# Patient Record
Sex: MALE | Race: WHITE | NOT HISPANIC OR LATINO | Employment: OTHER | URBAN - METROPOLITAN AREA
[De-identification: names, ages, dates, MRNs, and addresses within clinical notes are randomized per-mention and may not be internally consistent; named-entity substitution may affect disease eponyms.]

---

## 2017-01-12 ENCOUNTER — INITIAL CONSULT (OUTPATIENT)
Dept: SURGERY | Facility: CLINIC | Age: 72
End: 2017-01-12
Payer: MEDICARE

## 2017-01-12 VITALS
RESPIRATION RATE: 18 BRPM | WEIGHT: 250 LBS | DIASTOLIC BLOOD PRESSURE: 80 MMHG | BODY MASS INDEX: 33.13 KG/M2 | HEART RATE: 66 BPM | HEIGHT: 73 IN | OXYGEN SATURATION: 97 % | SYSTOLIC BLOOD PRESSURE: 122 MMHG

## 2017-01-12 DIAGNOSIS — K31.84 GASTROPARESIS: ICD-10-CM

## 2017-01-12 DIAGNOSIS — Z01.818 PREOP TESTING: ICD-10-CM

## 2017-01-12 DIAGNOSIS — R10.13 EPIGASTRIC ABDOMINAL PAIN: Primary | ICD-10-CM

## 2017-01-12 PROCEDURE — 99204 OFFICE O/P NEW MOD 45 MIN: CPT | Mod: S$GLB,,, | Performed by: PHYSICIAN ASSISTANT

## 2017-01-12 RX ORDER — AMLODIPINE AND BENAZEPRIL HYDROCHLORIDE 5; 10 MG/1; MG/1
1 CAPSULE ORAL DAILY
COMMUNITY
End: 2017-01-19 | Stop reason: SDUPTHER

## 2017-01-12 NOTE — MR AVS SNAPSHOT
[facility]  [address]  [address] [address]  Phone: [phone]  Fax: [phone]                  Nimesh Saini   2017 1:15 PM   Initial consult    Description:  Male : 1945   Provider:  Deepika Ramírez PA-C   Department:  [facility]           Reason for Visit     Other           Diagnoses this Visit        Comments    Epigastric abdominal pain    -  Primary     Gastroparesis         Preop testing                To Do List           Goals (5 Years of Data)     None      [facility]     [facility] Care [facility] -  Assistance  Registered nurses in the [facility] Call Center provide clinical advisement, health education, appointment booking, and other advisory services.  Call for this free service at [phone].             Medications           Message regarding Medications     Verify the changes and/or additions to your medication regime listed below are the same as discussed with your clinician today.  If any of these changes or additions are incorrect, please notify your healthcare provider.        STOP taking these medications     KLOR-CON M10 10 mEq tablet Take 2 tablets (20 mEq total) by mouth once daily.    amoxicillin-clavulanate 875-125mg (AUGMENTIN) 875-125 mg per tablet            Verify that the below list of medications is an accurate representation of the medications you are currently taking.  If none reported, the list may be blank. If incorrect, please contact your healthcare provider. Carry this list with you in case of emergency.           Current Medications     amlodipine-benazepril 5-10 mg (LOTREL) 5-10 mg per capsule Take 1 capsule by mouth once daily.    lisinopril (PRINIVIL,ZESTRIL) 20 MG tablet Take 2 tablets (40 mg total) by mouth once daily.    omeprazole (PRILOSEC) 20 MG capsule Take 1 capsule (20 mg total) by mouth once daily.    PROAIR HFA 90 mcg/actuation inhaler     tadalafil  "(CIALIS) 5 MG tablet Take 1 tablet (5 mg total) by mouth daily as needed for Erectile Dysfunction.           Clinical Reference Information           Vital Signs - Last Recorded  Most recent update: 1/12/2017  1:13 PM by Maia Yanes MA    BP Pulse Resp Ht Wt SpO2    122/80 (BP Location: Right arm, Patient Position: Sitting, BP Method: Manual) 66 18 6' 1" (1.854 m) 113.4 kg (250 lb) 97%    BMI                32.98 kg/m2          Blood Pressure          Most Recent Value    BP  122/80      Allergies as of 1/12/2017     No Known Allergies      Immunizations Administered on Date of Encounter - 1/12/2017     None      Orders Placed During Today's Visit      Normal Orders This Visit    Case request GI: ESOPHAGOGASTRODUODENOSCOPY (EGD)     Future Labs/Procedures Expected by Expires    Basic metabolic panel  1/12/2017 3/13/2018    CBC auto differential  1/12/2017 3/13/2018    EKG 12-lead  As directed 1/12/2018      [facility] Sign-Up     Activating your [facility] account is as easy as 1-2-3!     1) Visit [email or web address], select Sign Up Now, enter this activation code and your date of birth, then select Next.  [email or web address]  Expires: 2/26/2017  1:35 PM      2) Create a username and password to use when you visit [facility] in the future and select a security question in case you lose your password and select Next.    3) Enter your e-mail address and click Sign Up!    Additional Information  If you have questions, please e-mail [email or web address] or call [phone] to talk to our [facility] staff. Remember, [facility] is NOT to be used for urgent needs. For medical emergencies, dial 911.         Instructions    1.  Nothing to eat or drink after midnight Monday 1/16/2017  2.  Report to the Surgery Department @ Tuesday 1/17/2017  3.  You may take your blood pressure medication with a sip of water prior to reporting to surgery on Tuesday 1/17/2017.  4.  Call our office with any questions/concerns       "

## 2017-01-12 NOTE — PATIENT INSTRUCTIONS
1.  Nothing to eat or drink after midnight Monday 1/16/2017  2.  Report to the Surgery Department @ Tuesday 1/17/2017  3.  You may take your blood pressure medication with a sip of water prior to reporting to surgery on Tuesday 1/17/2017.  4.  Call our office with any questions/concerns

## 2017-01-12 NOTE — LETTER
January 12, 2017      Stephane Fuentes Jr., MD  [address]  [facility] [address]           [facility]  [address]  [address]  [facility] [address]  Phone: [phone]  Fax: [phone]          Patient: Nimesh Saini   MR Number: [id number]   YOB: 1945   Date of Visit: 1/12/2017       Dear Dr. Stephane Fuentes Jr.:    Thank you for referring Mr. Nimesh Saini to me for evaluation. Attached you will find relevant portions of my assessment and plan of care.    Impression:  Patient evaluated for epigastric abdominal pain with sensation of food not wanting to pass through the stomach.  Patient advises it is hard for him to belch.  Patient admits to nausea without emesis.    Plan:  EGD on 1/17/2017 with preoperative testing to include:  CBC, BMP & EKG.    If you have questions, please do not hesitate to call me. I look forward to following Mr. Nimesh Saini along with you.    Thank you for the opportunity to assist in the care of your patient.    Sincerely,      Deepika Ramírez PA-C    Enclosure  CC:  No Recipients    If you would like to receive this communication electronically, please contact [email or web address] or [phone] to request more information on [employer] access.    For providers and/or their staff who would like to refer a patient to [facility], please contact us through our one-stop-shop provider referral line, [facility], at [phone].    If you feel you have received this communication in error or would no longer like to receive these types of communications, please e-mail [email or web address]

## 2017-01-12 NOTE — PROGRESS NOTES
"History & Physical    SUBJECTIVE:     History of Present Illness:  Patient is a 71 y.o. male presents with complaints of his food "getting stuck" in the epigastric region of the abdomen.  Patient advises this problem has been occurring for quite some time.  Patient advises he does not go to the doctor right away.  Patient thinks this problem has been occurring since late October 2016 or early November 2016.   Patient admits to nausea, but denies vomiting, diarrhea or constipation.  Patient also advised it is hard for him to "belch" the food down.  I have reviewed patient's medical issues, past surgeries, social history, medications and allergies with him.    Chief Complaint   Patient presents with    Other     Food won't go down       Review of patient's allergies indicates:  No Known Allergies    Current Outpatient Prescriptions   Medication Sig Dispense Refill    amlodipine-benazepril 5-10 mg (LOTREL) 5-10 mg per capsule Take 1 capsule by mouth once daily.      lisinopril (PRINIVIL,ZESTRIL) 20 MG tablet Take 2 tablets (40 mg total) by mouth once daily. 90 tablet 3    omeprazole (PRILOSEC) 20 MG capsule Take 1 capsule (20 mg total) by mouth once daily. 30 capsule 0    PROAIR HFA 90 mcg/actuation inhaler       tadalafil (CIALIS) 5 MG tablet Take 1 tablet (5 mg total) by mouth daily as needed for Erectile Dysfunction. 30 tablet 11     No current facility-administered medications for this visit.        Past Medical History   Diagnosis Date    Diverticulitis large intestine 08/2014    Hypertension      Past Surgical History   Procedure Laterality Date    Umbilical hernia repair       Family History   Problem Relation Age of Onset    Diabetes Mother     Hypertension Father     Stroke Father      Social History   Substance Use Topics    Smoking status: Never Smoker    Smokeless tobacco: None    Alcohol use No        Review of Systems:  Review of Systems   Constitutional: Negative for activity change, " "appetite change, chills, diaphoresis, fatigue, fever and unexpected weight change.   HENT: Negative for congestion, postnasal drip, rhinorrhea, sinus pressure, sneezing and sore throat.    Respiratory: Negative for cough, choking, shortness of breath, wheezing and stridor.    Cardiovascular: Negative for chest pain and palpitations.   Gastrointestinal: Positive for abdominal distention, abdominal pain (epigastric) and nausea. Negative for constipation, diarrhea and vomiting.   Musculoskeletal: Negative for arthralgias, back pain, myalgias, neck pain and neck stiffness.   Skin: Negative for color change, pallor, rash and wound.   Neurological: Negative for dizziness, syncope, weakness, light-headedness and headaches.   Psychiatric/Behavioral: Negative for agitation, confusion and hallucinations. The patient is not nervous/anxious and is not hyperactive.        OBJECTIVE:     Vital Signs (Most Recent)  Pulse: 66 (01/12/17 1311)  Resp: 18 (01/12/17 1311)  BP: 122/80 (01/12/17 1311)  SpO2: 97 % (01/12/17 1311)  6' 1" (1.854 m)  113.4 kg (250 lb)     Physical Exam:  Physical Exam   Constitutional: He is oriented to person, place, and time. He appears well-developed and well-nourished. No distress.   HENT:   Head: Normocephalic and atraumatic.   Right Ear: External ear normal.   Left Ear: External ear normal.   Nose: Nose normal.   Eyes: Conjunctivae and EOM are normal. Pupils are equal, round, and reactive to light. No scleral icterus.   Neck: Normal range of motion. Neck supple. No tracheal deviation present.   Cardiovascular: Normal rate, regular rhythm and normal heart sounds.  Exam reveals no gallop and no friction rub.    No murmur heard.  Pulmonary/Chest: Effort normal and breath sounds normal. No stridor. No respiratory distress. He has no wheezes. He has no rales.   Abdominal: Soft. Bowel sounds are normal. He exhibits distension (slight). He exhibits no mass. There is tenderness (epigastric). There is no " guarding.   Musculoskeletal: Normal range of motion. He exhibits no edema or deformity.   Neurological: He is alert and oriented to person, place, and time. Coordination normal.   Skin: Skin is warm and dry. No rash noted. He is not diaphoretic. No erythema. No pallor.   Psychiatric: He has a normal mood and affect. His behavior is normal. Judgment and thought content normal.   Nursing note and vitals reviewed.      Laboratory  No recent CBC, BMP    Diagnostic Results:  No recent EKG    ASSESSMENT/PLAN:     1.  Epigastric Abdominal Pain  2.  Possible Gastroparesis  3.  HTN  4.  H/o Diverticulitis      PLAN:Plan     1.  EGD on Tuesday 1/17/2017  2.  Preoperative testing to include: CBC, BMP & EKG  3.  Dr Ramirez advised of above.

## 2017-01-17 PROBLEM — K21.9 HIATAL HERNIA WITH GERD: Status: ACTIVE | Noted: 2017-01-17

## 2017-01-17 PROBLEM — K44.9 HIATAL HERNIA WITH GERD: Status: ACTIVE | Noted: 2017-01-17

## 2017-01-18 NOTE — TELEPHONE ENCOUNTER
He should not be taking lisinopril and benazepril simultaneously. Please contact the patient to confirm refill request.

## 2017-01-19 ENCOUNTER — TELEPHONE (OUTPATIENT)
Dept: FAMILY MEDICINE | Facility: CLINIC | Age: 72
End: 2017-01-19

## 2017-01-19 RX ORDER — AMLODIPINE AND BENAZEPRIL HYDROCHLORIDE 5; 10 MG/1; MG/1
CAPSULE ORAL
Qty: 90 CAPSULE | Refills: 0 | Status: SHIPPED | OUTPATIENT
Start: 2017-01-19 | End: 2017-01-20 | Stop reason: SDUPTHER

## 2017-01-19 RX ORDER — LISINOPRIL 20 MG/1
TABLET ORAL
Qty: 90 TABLET | Refills: 0 | Status: SHIPPED | OUTPATIENT
Start: 2017-01-19 | End: 2017-01-19 | Stop reason: SDUPTHER

## 2017-01-19 RX ORDER — OMEPRAZOLE 20 MG/1
CAPSULE, DELAYED RELEASE ORAL
Qty: 90 CAPSULE | Refills: 3 | Status: SHIPPED | OUTPATIENT
Start: 2017-01-19 | End: 2017-04-03

## 2017-01-19 RX ORDER — LISINOPRIL 20 MG/1
40 TABLET ORAL DAILY
Qty: 90 TABLET | Refills: 3 | Status: SHIPPED | OUTPATIENT
Start: 2017-01-19 | End: 2017-01-20 | Stop reason: SDUPTHER

## 2017-01-19 RX ORDER — AMLODIPINE AND BENAZEPRIL HYDROCHLORIDE 5; 10 MG/1; MG/1
1 CAPSULE ORAL DAILY
Qty: 90 CAPSULE | Refills: 3 | Status: SHIPPED | OUTPATIENT
Start: 2017-01-19 | End: 2017-01-23

## 2017-01-19 RX ORDER — OMEPRAZOLE 20 MG/1
CAPSULE, DELAYED RELEASE ORAL
Qty: 30 CAPSULE | Refills: 0 | Status: SHIPPED | OUTPATIENT
Start: 2017-01-19 | End: 2017-01-19 | Stop reason: SDUPTHER

## 2017-01-19 NOTE — TELEPHONE ENCOUNTER
Dr. Fuentes, patient states he has been taking both lisinopril and amlodipine-benazepril at the same time for many years, greater than 10 years; please advise

## 2017-01-20 ENCOUNTER — TELEPHONE (OUTPATIENT)
Dept: FAMILY MEDICINE | Facility: CLINIC | Age: 72
End: 2017-01-20

## 2017-01-20 RX ORDER — LISINOPRIL 20 MG/1
40 TABLET ORAL DAILY
Qty: 90 TABLET | Refills: 3 | Status: SHIPPED | OUTPATIENT
Start: 2017-01-20 | End: 2017-05-16 | Stop reason: DRUGHIGH

## 2017-01-20 RX ORDER — AMLODIPINE AND BENAZEPRIL HYDROCHLORIDE 5; 10 MG/1; MG/1
1 CAPSULE ORAL DAILY
Qty: 90 CAPSULE | Refills: 3 | Status: SHIPPED | OUTPATIENT
Start: 2017-01-20 | End: 2017-01-23

## 2017-01-20 RX ORDER — AMLODIPINE BESYLATE 10 MG/1
10 TABLET ORAL DAILY
Qty: 90 TABLET | Refills: 3 | Status: ON HOLD | OUTPATIENT
Start: 2017-01-20 | End: 2017-05-02 | Stop reason: HOSPADM

## 2017-01-20 NOTE — TELEPHONE ENCOUNTER
Patient calling regarding his medication prilosec 20mg Lisinopril 20mg, Amlodipine 5-10mg , informed patient medications were sent to [facility], patient verbalized understanding

## 2017-01-20 NOTE — TELEPHONE ENCOUNTER
----- Message from Staci Hampton sent at 1/20/2017 11:56 AM CST -----  Need rx clarification.please advise.

## 2017-01-23 ENCOUNTER — OFFICE VISIT (OUTPATIENT)
Dept: SURGERY | Facility: CLINIC | Age: 72
End: 2017-01-23
Payer: MEDICARE

## 2017-01-23 VITALS
HEIGHT: 73 IN | HEART RATE: 66 BPM | WEIGHT: 250 LBS | DIASTOLIC BLOOD PRESSURE: 78 MMHG | OXYGEN SATURATION: 96 % | SYSTOLIC BLOOD PRESSURE: 130 MMHG | BODY MASS INDEX: 33.13 KG/M2 | RESPIRATION RATE: 19 BRPM

## 2017-01-23 DIAGNOSIS — K44.9 HIATAL HERNIA WITH GERD: Primary | ICD-10-CM

## 2017-01-23 DIAGNOSIS — K21.9 HIATAL HERNIA WITH GERD: Primary | ICD-10-CM

## 2017-01-23 PROCEDURE — 99213 OFFICE O/P EST LOW 20 MIN: CPT | Mod: S$GLB,,, | Performed by: EMERGENCY MEDICINE

## 2017-01-23 NOTE — PROGRESS NOTES
History & Physical    SUBJECTIVE:     History of Present Illness:  Patient is a 71 y.o. male presents for evaluation of epigastric pain . He had an EGD that was relatively unremarkable. He had an UGI that re-demonstrated H. Hernia and GERD without other significant findings      Chief Complaint   Patient presents with    Follow-up     EGD And Upper GI       Review of patient's allergies indicates:  No Known Allergies    Current Outpatient Prescriptions   Medication Sig Dispense Refill    amlodipine (NORVASC) 10 MG tablet Take 1 tablet (10 mg total) by mouth once daily. 90 tablet 3    lisinopril (PRINIVIL,ZESTRIL) 20 MG tablet Take 2 tablets (40 mg total) by mouth once daily. 90 tablet 3    omeprazole (PRILOSEC) 20 MG capsule TAKE 1 CAPSULE(20 MG) BY MOUTH EVERY DAY 90 capsule 3    PROAIR HFA 90 mcg/actuation inhaler       tadalafil (CIALIS) 5 MG tablet Take 1 tablet (5 mg total) by mouth daily as needed for Erectile Dysfunction. 30 tablet 11     No current facility-administered medications for this visit.        Past Medical History   Diagnosis Date    Diverticulitis large intestine 08/2014    Hypertension      Past Surgical History   Procedure Laterality Date    Umbilical hernia repair       Family History   Problem Relation Age of Onset    Diabetes Mother     Hypertension Father     Stroke Father      Social History   Substance Use Topics    Smoking status: Never Smoker    Smokeless tobacco: None    Alcohol use No        Review of Systems:  Review of Systems   Constitutional: Negative for appetite change, chills, diaphoresis, fatigue, fever and unexpected weight change.   HENT: Negative for congestion, dental problem, ear pain, facial swelling, mouth sores, nosebleeds, rhinorrhea, sinus pressure, sore throat, trouble swallowing and voice change.    Eyes: Negative for photophobia, redness and visual disturbance.   Respiratory: Negative for cough, choking, chest tightness, shortness of breath and  "wheezing.    Cardiovascular: Negative for chest pain, palpitations and leg swelling.   Gastrointestinal: Negative for abdominal distention, abdominal pain, anal bleeding, blood in stool, constipation, diarrhea, nausea, rectal pain and vomiting.        Improved ; has tried without prilosec; mild epigastric pain after eating   Endocrine: Negative for cold intolerance, heat intolerance, polydipsia, polyphagia and polyuria.   Genitourinary: Negative for difficulty urinating, discharge, dysuria, flank pain, hematuria, scrotal swelling and testicular pain.   Musculoskeletal: Negative for back pain, gait problem, joint swelling, neck pain and neck stiffness.   Skin: Negative for rash and wound.   Allergic/Immunologic: Negative for environmental allergies, food allergies and immunocompromised state.   Neurological: Negative for dizziness, seizures, syncope, facial asymmetry, speech difficulty, weakness, light-headedness, numbness and headaches.   Hematological: Negative for adenopathy. Does not bruise/bleed easily.   Psychiatric/Behavioral: Negative for confusion. The patient is not nervous/anxious.        OBJECTIVE:     Vital Signs (Most Recent)  Pulse: 66 (01/23/17 1405)  Resp: 19 (01/23/17 1405)  BP: 130/78 (01/23/17 1405)  SpO2: 96 % (01/23/17 1405)  6' 1" (1.854 m)  113.4 kg (250 lb)     Physical Exam:  Physical Exam   Constitutional: He is oriented to person, place, and time. He appears well-developed and well-nourished. No distress.   HENT:   Head: Normocephalic and atraumatic.   Right Ear: External ear normal.   Left Ear: External ear normal.   Nose: Nose normal.   Mouth/Throat: Oropharynx is clear and moist.   Eyes: Conjunctivae and EOM are normal. Pupils are equal, round, and reactive to light. No scleral icterus.   Neck: No JVD present. No tracheal deviation present. No thyromegaly present.   Cardiovascular: Normal rate, regular rhythm, normal heart sounds and intact distal pulses.  Exam reveals no gallop and no " friction rub.    No murmur heard.  Pulmonary/Chest: Effort normal and breath sounds normal. No stridor. No respiratory distress. He has no wheezes. He has no rales. He exhibits no tenderness.   Abdominal: Soft. Bowel sounds are normal. He exhibits no distension and no mass. There is no tenderness. There is no rebound and no guarding. No hernia.   Genitourinary: Rectum normal, prostate normal and penis normal.   Musculoskeletal: He exhibits no edema or tenderness.   Lymphadenopathy:     He has no cervical adenopathy.   Neurological: He is alert and oriented to person, place, and time. He has normal reflexes. He displays normal reflexes.   Skin: Skin is warm and dry. No rash noted. He is not diaphoretic. No erythema. No pallor.   Psychiatric: He has a normal mood and affect.       Diagnostic Results:  Reviewed his UGI with SBFT with radiologist and then with the patient    ASSESSMENT/PLAN:     Hiatal Hernia with mild reflux    PLAN:Plan     \Discussed use of mallox or mylanta 30 min after eating and prior to bedtime  He will call if no relief

## 2017-03-27 ENCOUNTER — OFFICE VISIT (OUTPATIENT)
Dept: FAMILY MEDICINE | Facility: CLINIC | Age: 72
End: 2017-03-27
Payer: MEDICARE

## 2017-03-27 VITALS
SYSTOLIC BLOOD PRESSURE: 110 MMHG | WEIGHT: 237.44 LBS | OXYGEN SATURATION: 96 % | BODY MASS INDEX: 31.47 KG/M2 | HEART RATE: 76 BPM | HEIGHT: 73 IN | DIASTOLIC BLOOD PRESSURE: 88 MMHG

## 2017-03-27 DIAGNOSIS — K44.9 HIATAL HERNIA WITH GERD: Primary | ICD-10-CM

## 2017-03-27 DIAGNOSIS — R06.09 EXERTIONAL DYSPNEA: ICD-10-CM

## 2017-03-27 DIAGNOSIS — K21.9 HIATAL HERNIA WITH GERD: Primary | ICD-10-CM

## 2017-03-27 PROCEDURE — 99214 OFFICE O/P EST MOD 30 MIN: CPT | Mod: S$PBB,,,

## 2017-03-27 PROCEDURE — 99213 OFFICE O/P EST LOW 20 MIN: CPT | Mod: PBBFAC,PO

## 2017-03-27 PROCEDURE — 99999 PR PBB SHADOW E&M-EST. PATIENT-LVL III: CPT | Mod: PBBFAC,,,

## 2017-03-27 NOTE — PROGRESS NOTES
Subjective:       Patient ID: Nimesh Saini is a 71 y.o. male.    Chief Complaint: Shortness of Breath    HPI The patient presents with complaints of shortness of breath associated with difficulty swallowing. He gets fullness and bloating in the epigastric region. He was evaluated by Dr. Ramirez and diagnosed with a Hiatal hernia and prescribed omeprazole. This is not helping and he is losing weight and is afraid to eat. His symptoms are resolved by having a BM. He is unable to belch. He has lost 25 lbs.     Review of Systems   Constitutional: Positive for appetite change. Negative for activity change, fatigue and unexpected weight change.   HENT: Positive for trouble swallowing.    Respiratory: Positive for shortness of breath and wheezing. Negative for cough and choking.         With exertion    Cardiovascular: Negative for chest pain.   Gastrointestinal: Positive for abdominal distention, abdominal pain and constipation. Negative for diarrhea, nausea and vomiting.   Neurological: Positive for light-headedness.   Psychiatric/Behavioral: Negative.    All other systems reviewed and are negative.      Objective:      Vitals:    03/27/17 0826   BP: 110/88   Pulse: 76     Physical Exam   Constitutional: He is oriented to person, place, and time. He appears well-developed and well-nourished. He is cooperative. No distress.   HENT:   Head: Normocephalic and atraumatic.   Right Ear: Hearing, tympanic membrane, external ear and ear canal normal.   Left Ear: Hearing, external ear and ear canal normal.   Nose: Nose normal.   Mouth/Throat: Oropharynx is clear and moist.   Eyes: Conjunctivae are normal. Pupils are equal, round, and reactive to light.   Neck: Normal range of motion. Neck supple. No thyromegaly present.   Cardiovascular: Normal rate, regular rhythm, normal heart sounds and intact distal pulses.    Pulmonary/Chest: Effort normal and breath sounds normal. No respiratory distress.   Musculoskeletal: Normal range  of motion. He exhibits no edema or tenderness.   Lymphadenopathy:     He has no cervical adenopathy.   Neurological: He is alert and oriented to person, place, and time. He has normal strength. Coordination and gait normal.   Skin: Skin is warm, dry and intact. No cyanosis. Nails show no clubbing.   Psychiatric: He has a normal mood and affect. His speech is normal and behavior is normal. Judgment and thought content normal. Cognition and memory are normal.   Vitals reviewed.      Assessment:       1. Hiatal hernia with GERD    2. Exertional dyspnea        Plan:       Hiatal hernia with GERD  -     Ambulatory referral to Gastroenterology  -     H. PYLORI ANTIBODY, IGG; Future; Expected date: 3/27/17  -     H. pylori antigen, stool; Future; Expected date: 3/27/17  -     CT Abdomen Pelvis  Without Contrast; Future; Expected date: 3/27/17    Exertional dyspnea  -     Ambulatory referral to Cardiology  -     X-Ray Chest PA And Lateral; Future; Expected date: 3/27/17      Return if symptoms worsen or fail to improve.

## 2017-03-27 NOTE — MR AVS SNAPSHOT
[facility]  [address] [place] [address]  Phone: [phone]  Fax: [phone]                  Nimesh Saini   3/27/2017 8:20 AM   Office Visit    Description:  Male : 1945   Provider:  Stephane Fuentes Jr., MD   Department:  [facility]           Reason for Visit     Shortness of Breath           Diagnoses this Visit        Comments    Hiatal hernia with GERD    -  Primary     Exertional dyspnea                To Do List           Goals (5 Years of Data)     None      Follow-Up and Disposition     Return if symptoms worsen or fail to improve.    Follow-up and Disposition History      [facility]     [facility] Care Line -  Assistance  Registered nurses in the [facility] Center provide clinical advisement, health education, appointment booking, and other advisory services.  Call for this free service at [phone].             Medications           Message regarding Medications     Verify the changes and/or additions to your medication regime listed below are the same as discussed with your clinician today.  If any of these changes or additions are incorrect, please notify your healthcare provider.             Verify that the below list of medications is an accurate representation of the medications you are currently taking.  If none reported, the list may be blank. If incorrect, please contact your healthcare provider. Carry this list with you in case of emergency.           Current Medications     amlodipine (NORVASC) 10 MG tablet Take 1 tablet (10 mg total) by mouth once daily.    lisinopril (PRINIVIL,ZESTRIL) 20 MG tablet Take 2 tablets (40 mg total) by mouth once daily.    omeprazole (PRILOSEC) 20 MG capsule TAKE 1 CAPSULE(20 MG) BY MOUTH EVERY DAY    PROAIR HFA 90 mcg/actuation inhaler     tadalafil (CIALIS) 5 MG tablet Take 1 tablet (5 mg total) by mouth daily as needed for Erectile Dysfunction.           Clinical  "Reference Information           Your Vitals Were     BP Pulse Height Weight SpO2 BMI    110/88 (BP Location: Right arm, Patient Position: Sitting, BP Method: Manual) 76 6' 1" (1.854 m) 107.7 kg (237 lb 7 oz) 96% 31.33 kg/m2      Blood Pressure          Most Recent Value    BP  110/88      Allergies as of 3/27/2017     No Known Allergies      Immunizations Administered on Date of Encounter - 3/27/2017     None      Orders Placed During Today's Visit      Normal Orders This Visit    Ambulatory referral to Cardiology     Ambulatory referral to Gastroenterology     Future Labs/Procedures Expected by Expires    CT Abdomen Pelvis  Without Contrast  3/27/2017 3/27/2018    H. PYLORI ANTIBODY, IGG  3/27/2017 5/26/2018    H. pylori antigen, stool  3/27/2017 3/27/2018    X-Ray Chest PA And Lateral  3/27/2017 3/27/2018      [facility] Sign-Up     Activating your [facility] account is as easy as 1-2-3!     1) Visit [email or web address], select Sign Up Now, enter this activation code and your date of birth, then select Next.  [email or web address]  Expires: 5/11/2017  8:58 AM      2) Create a username and password to use when you visit [facility] in the future and select a security question in case you lose your password and select Next.    3) Enter your e-mail address and click Sign Up!    Additional Information  If you have questions, please e-mail [email or web address] or call [phone] to talk to our [facility] staff. Remember, [facility] is NOT to be used for urgent needs. For medical emergencies, dial 911.         Language Assistance Services     ATTENTION: Language assistance services are available, free of charge. Please call [phone].      ATENCIÓN: Si habla español, tiene a jefferson disposición servicios gratuitos de asistencia lingüística. Llame al [phone].     CHÚ Ý: N?u b?n nói Ti?ng Vi?t, có các d?ch v? h? tr? ngôn ng? mi?n phí dành cho b?n. G?i s? [phone].         [facility] complies with applicable " Federal civil rights laws and does not discriminate on the basis of race, color, national origin, age, disability, or sex.

## 2017-03-30 ENCOUNTER — TELEPHONE (OUTPATIENT)
Dept: FAMILY MEDICINE | Facility: CLINIC | Age: 72
End: 2017-03-30

## 2017-03-30 NOTE — TELEPHONE ENCOUNTER
His CT showed fluid in the space between his lungs and ribs. This should be further evaluated by Dr. Avendano. His CT showed thickening in the rectal area. This should be further evaluated by GI.

## 2017-04-03 ENCOUNTER — INITIAL CONSULT (OUTPATIENT)
Dept: GASTROENTEROLOGY | Facility: CLINIC | Age: 72
End: 2017-04-03
Payer: MEDICARE

## 2017-04-03 ENCOUNTER — TELEPHONE (OUTPATIENT)
Dept: FAMILY MEDICINE | Facility: CLINIC | Age: 72
End: 2017-04-03

## 2017-04-03 VITALS
WEIGHT: 233 LBS | BODY MASS INDEX: 31.56 KG/M2 | DIASTOLIC BLOOD PRESSURE: 93 MMHG | HEIGHT: 72 IN | SYSTOLIC BLOOD PRESSURE: 140 MMHG | HEART RATE: 77 BPM

## 2017-04-03 DIAGNOSIS — K21.9 GASTROESOPHAGEAL REFLUX DISEASE, ESOPHAGITIS PRESENCE NOT SPECIFIED: ICD-10-CM

## 2017-04-03 DIAGNOSIS — R63.4 WEIGHT LOSS: ICD-10-CM

## 2017-04-03 DIAGNOSIS — R93.89 ABNORMAL FINDING ON IMAGING: Primary | ICD-10-CM

## 2017-04-03 PROCEDURE — 99999 PR PBB SHADOW E&M-EST. PATIENT-LVL II: CPT | Mod: PBBFAC,,, | Performed by: INTERNAL MEDICINE

## 2017-04-03 PROCEDURE — 99212 OFFICE O/P EST SF 10 MIN: CPT | Mod: PBBFAC,PN | Performed by: INTERNAL MEDICINE

## 2017-04-03 PROCEDURE — 99204 OFFICE O/P NEW MOD 45 MIN: CPT | Mod: S$PBB,,, | Performed by: INTERNAL MEDICINE

## 2017-04-03 RX ORDER — PANTOPRAZOLE SODIUM 40 MG/1
40 TABLET, DELAYED RELEASE ORAL DAILY
Qty: 30 TABLET | Refills: 11 | Status: SHIPPED | OUTPATIENT
Start: 2017-04-03 | End: 2018-03-07 | Stop reason: SDUPTHER

## 2017-04-03 NOTE — LETTER
April 3, 2017      Stephane Fuentes Jr., MD  [address] [place] [address]           [facility]  [address]  [address]  [facility] [address]  Phone: [phone]          Patient: Nimesh Saini   MR Number: [id number]   YOB: 1945   Date of Visit: 4/3/2017       Dear Dr. Stephane Fuentes Jr.:    Thank you for referring Nimesh Saini to me for evaluation. Attached you will find relevant portions of my assessment and plan of care.    If you have questions, please do not hesitate to call me. I look forward to following Nimesh Saini along with you.    Sincerely,    Hai Jay MD    Enclosure  CC:  No Recipients    If you would like to receive this communication electronically, please contact [email or web address] or [phone] to request more information on [employer] access.    For providers and/or their staff who would like to refer a patient to [facility], please contact us through our one-stop-shop provider referral line, [facility], at [phone].    If you feel you have received this communication in error or would no longer like to receive these types of communications, please e-mail [email or web address]

## 2017-04-03 NOTE — TELEPHONE ENCOUNTER
----- Message from Rosita Flowers sent at 4/3/2017  1:11 PM CDT -----  Contact: pt [phone]  TEST RESULTS:   Patient would like to get test results.  Name of test (lab, mammo, etc.): stool test   Date of test: last week

## 2017-04-03 NOTE — PROGRESS NOTES
Subjective:       Patient ID: Nimesh Saini is a 71 y.o. male.    Chief Complaint: Hiatal Hernia    This is a 71-year-old male who presents for evaluation of weight loss, dysphagia and reflux.  He has noted approximate 40 pound weight loss over the past 9 months.  The weight loss is unintentional has been associated with some GI symptoms.  In addition to dysphagia which occurs to both solids and liquids, he is also noted some significant fatigue, weakness, lethargy and malaise.  He notes increasing dyspnea on exertion, some orthopnea.  He does note some substernal burning sensation.  This has not responded to omeprazole.  An upper endoscopy noted reflux changes and a medium-sized hiatal hernia.  I reviewed the outpatient records also noting an unremarkable upper GI series.  Cardia megaly and bilateral pleural effusions were noted.  No cough or hemoptysis.  His history also of rectal surgery and reports colonoscopy about 2 years ago.  CT of the abdomen also notes some thickening in the rectum.  He tolerated anesthesia well for his upper endoscopy.    The following portions of the patient's history were reviewed and updated as appropriate: allergies, current medications, past family history, past medical history, past social history, past surgical history and problem list.    (Portions of this note were dictated using voice recognition software and may contain dictation related errors in spelling/grammar/syntax not found on text review)    HPI  Review of Systems   Constitutional: Positive for unexpected weight change. Negative for chills and fever.   HENT: Positive for trouble swallowing. Negative for postnasal drip.    Eyes: Negative for pain and visual disturbance.   Respiratory: Positive for shortness of breath and wheezing. Negative for cough and choking.    Cardiovascular: Negative for chest pain and leg swelling.   Gastrointestinal: Negative for abdominal distention, abdominal pain, anal bleeding, blood in  stool, constipation, diarrhea, nausea, rectal pain and vomiting.   Endocrine: Negative for cold intolerance and heat intolerance.   Genitourinary: Negative for difficulty urinating and hematuria.   Musculoskeletal: Positive for arthralgias. Negative for back pain.   Neurological: Negative for dizziness and numbness.   Hematological: Negative for adenopathy. Does not bruise/bleed easily.   Psychiatric/Behavioral: Negative for agitation and confusion.       Objective:      Physical Exam   Constitutional: He is oriented to person, place, and time. He appears well-developed and well-nourished. No distress.   HENT:   Head: Normocephalic.   Eyes: Conjunctivae are normal. No scleral icterus.   Neck: No tracheal deviation present. No thyromegaly present.   Cardiovascular: Normal rate, regular rhythm and normal heart sounds.  Exam reveals no gallop and no friction rub.    Pulmonary/Chest: Effort normal. He has wheezes. He has no rales.   Decreased at bases el   Abdominal: Soft. Bowel sounds are normal. He exhibits no distension. There is no tenderness. There is no rebound and no guarding.   Musculoskeletal: Normal range of motion. He exhibits edema. He exhibits no tenderness.   Neurological: He is alert and oriented to person, place, and time.   Skin: He is not diaphoretic.   Psychiatric: He has a normal mood and affect. His behavior is normal.   Nursing note and vitals reviewed.      Labs; reviewed  Assessment:       1. Abnormal finding on imaging    2. Gastroesophageal reflux disease, esophagitis presence not specified    3. Weight loss        Plan:   1. Suspect he has CHF, has cardiology apt in two weeks  2. Will likely need endoscopic evaluation of CT pelvis findings  3. If dysphagia persists, will consider manometry  4. Trial of alternate PPI, has failed OTC meds, H2 blocker and omeprazole  5. RTC 6 weeks

## 2017-04-04 ENCOUNTER — TELEPHONE (OUTPATIENT)
Dept: GASTROENTEROLOGY | Facility: CLINIC | Age: 72
End: 2017-04-04

## 2017-04-04 ENCOUNTER — TELEPHONE (OUTPATIENT)
Dept: FAMILY MEDICINE | Facility: CLINIC | Age: 72
End: 2017-04-04

## 2017-04-04 NOTE — TELEPHONE ENCOUNTER
----- Message from Eric Morley sent at 4/3/2017  4:41 PM CDT -----  Contact: self/[phone]  He is returning the nurse's call.

## 2017-04-04 NOTE — TELEPHONE ENCOUNTER
I left a voice mail message telling the pt that the rx was sent to the [facility] pharmacy due to the need for prior auth. I also explained this to the pt at the time of his visit.

## 2017-04-04 NOTE — TELEPHONE ENCOUNTER
----- Message from Leena Rayna sent at 4/3/2017  3:44 PM CDT -----  Contact: self, [phone]  Patient states his pantoprazole prescription was supposed to be sent to [facility] pharmacy in [place]. Please advise.

## 2017-05-02 PROBLEM — I50.41 ACUTE COMBINED SYSTOLIC AND DIASTOLIC CHF, NYHA CLASS 3: Status: ACTIVE | Noted: 2017-05-02

## 2017-05-02 PROBLEM — R06.02 SHORTNESS OF BREATH: Status: ACTIVE | Noted: 2017-05-02

## 2017-05-02 PROBLEM — I25.5 ISCHEMIC CARDIOMYOPATHY: Status: ACTIVE | Noted: 2017-05-02

## 2017-05-02 PROBLEM — I42.9 CARDIOMYOPATHY: Status: ACTIVE | Noted: 2017-05-02

## 2017-05-02 PROBLEM — I25.10 CORONARY ARTERY DISEASE INVOLVING NATIVE HEART: Status: ACTIVE | Noted: 2017-05-02

## 2017-05-15 ENCOUNTER — OFFICE VISIT (OUTPATIENT)
Dept: GASTROENTEROLOGY | Facility: CLINIC | Age: 72
End: 2017-05-15
Payer: MEDICARE

## 2017-05-15 VITALS
BODY MASS INDEX: 30.48 KG/M2 | SYSTOLIC BLOOD PRESSURE: 122 MMHG | HEIGHT: 73 IN | WEIGHT: 230 LBS | DIASTOLIC BLOOD PRESSURE: 72 MMHG

## 2017-05-15 DIAGNOSIS — R10.13 EPIGASTRIC ABDOMINAL PAIN: Primary | ICD-10-CM

## 2017-05-15 DIAGNOSIS — R93.89 ABNORMAL FINDINGS ON IMAGING TEST: ICD-10-CM

## 2017-05-15 PROCEDURE — 99212 OFFICE O/P EST SF 10 MIN: CPT | Mod: PBBFAC,PN | Performed by: INTERNAL MEDICINE

## 2017-05-15 PROCEDURE — 99214 OFFICE O/P EST MOD 30 MIN: CPT | Mod: S$PBB,,, | Performed by: INTERNAL MEDICINE

## 2017-05-15 PROCEDURE — 99999 PR PBB SHADOW E&M-EST. PATIENT-LVL II: CPT | Mod: PBBFAC,,, | Performed by: INTERNAL MEDICINE

## 2017-05-15 NOTE — PROGRESS NOTES
Subjective:       Patient ID: Nimesh Saini is a 71 y.o. male.    Chief Complaint: Follow-up    This is a 71-year-old male who presents for a f/u visit regarding weight loss, dysphagia and reflux. Initially he has noted approximately 40 pounds of weight loss over the preceding 9 months. The weight loss is unintentional has been associated with some GI symptoms. In addition to dysphagia which occurs to both solids and liquids, he is also noted some significant fatigue, weakness, lethargy and malaise. He notes increasing dyspnea on exertion, some orthopnea. He does note some substernal burning sensation. This has not responded to omeprazole. An upper endoscopy noted reflux changes and a medium-sized hiatal hernia. I reviewed the outpatient records also noting an unremarkable upper GI series. Cardia megaly and bilateral pleural effusions were noted and he has an EF noted of 15%. Recent cardiac cath noted diffuse CAD. No cough or hemoptysis. His history also of rectal surgery and reports colonoscopy about 2 years ago. CT of the abdomen also notes some thickening in the rectum. He tolerated anesthesia well for his upper endoscopy. We changed his PPI and he is markedly better. Less belching, dysphagia resolved. He has gained 5lbs. Currently he is wearing a defibrillator.     The following portions of the patient's history were reviewed and updated as appropriate: allergies, current medications, past family history, past medical history, past social history, past surgical history and problem list.     (Portions of this note were dictated using voice recognition software and may contain dictation related errors in spelling/grammar/syntax not found on text review)    HPI  Review of Systems   Constitutional: Negative for activity change, appetite change, fatigue and unexpected weight change.   Respiratory: Positive for shortness of breath. Negative for cough.    Gastrointestinal: Negative for abdominal distention,  abdominal pain, blood in stool, constipation and diarrhea.       Objective:      Physical Exam   Constitutional: He is oriented to person, place, and time. He appears well-developed and well-nourished. No distress.   HENT:   Head: Normocephalic.   Eyes: Conjunctivae are normal. No scleral icterus.   Neck: No tracheal deviation present. No thyromegaly present.   Abdominal: Soft. Bowel sounds are normal. He exhibits no distension. There is no tenderness. There is no rebound and no guarding.   Musculoskeletal: Normal range of motion. He exhibits no edema or tenderness.   Neurological: He is alert and oriented to person, place, and time.   Skin: He is not diaphoretic.   Psychiatric: He has a normal mood and affect. His behavior is normal.   Nursing note and vitals reviewed.      Labs; reviewed  Cath report - reviewed  Assessment:       1. Epigastric abdominal pain    2. Abnormal findings on imaging test        Plan:   1. F/u his cardiology visit records tomorrow  2. Likely needs at least a flex sig, will discuss sedation with cardiology  3. Continue PPI

## 2017-05-16 ENCOUNTER — OFFICE VISIT (OUTPATIENT)
Dept: CARDIOTHORACIC SURGERY | Facility: CLINIC | Age: 72
End: 2017-05-16
Payer: MEDICARE

## 2017-05-16 VITALS
WEIGHT: 230.38 LBS | HEART RATE: 54 BPM | BODY MASS INDEX: 30.53 KG/M2 | OXYGEN SATURATION: 95 % | SYSTOLIC BLOOD PRESSURE: 121 MMHG | TEMPERATURE: 99 F | DIASTOLIC BLOOD PRESSURE: 69 MMHG | HEIGHT: 73 IN

## 2017-05-16 DIAGNOSIS — I25.10 CORONARY ARTERY DISEASE INVOLVING NATIVE HEART, ANGINA PRESENCE UNSPECIFIED, UNSPECIFIED VESSEL OR LESION TYPE: ICD-10-CM

## 2017-05-16 DIAGNOSIS — I25.5 ISCHEMIC CARDIOMYOPATHY: Primary | ICD-10-CM

## 2017-05-16 PROCEDURE — 99999 PR PBB SHADOW E&M-EST. PATIENT-LVL III: CPT | Mod: PBBFAC,,, | Performed by: THORACIC SURGERY (CARDIOTHORACIC VASCULAR SURGERY)

## 2017-05-16 PROCEDURE — 99213 OFFICE O/P EST LOW 20 MIN: CPT | Mod: PBBFAC | Performed by: THORACIC SURGERY (CARDIOTHORACIC VASCULAR SURGERY)

## 2017-05-16 PROCEDURE — 99205 OFFICE O/P NEW HI 60 MIN: CPT | Mod: S$PBB,,, | Performed by: THORACIC SURGERY (CARDIOTHORACIC VASCULAR SURGERY)

## 2017-05-16 RX ORDER — LISINOPRIL 40 MG/1
40 TABLET ORAL DAILY
Refills: 6 | Status: ON HOLD | COMMUNITY
Start: 2017-04-19 | End: 2018-12-26

## 2017-05-16 NOTE — PROGRESS NOTES
History & Physical    SUBJECTIVE:     History of Present Illness:  Patient is a 71 y.o. male presents in referral from Dr. Avendano with CAD, acute on chronic systolic CHF, moderate to severe MR, moderate TR, GERD, HTN, and hiatal hernia.  He states he has lost about 50 pounds over the past several months.  He presents with shortness of breath with minimal activity and decreased exercise tolerance.  He states he is very active and works his garden without chest pain.  He denies palpitations, pedal edema, PND, orthopnea, syncope, or presyncope.  He presents with an echo finding of an EF of 20-25% however LVEDD isn't mentioned.  Echo states the LV is severely enlarged.  STS score is 6.8%.  He has NYHA class II symptoms.    No chief complaint on file.      Review of patient's allergies indicates:  No Known Allergies    Current Outpatient Prescriptions   Medication Sig Dispense Refill    carvedilol (COREG) 6.25 MG tablet Take 1 tablet (6.25 mg total) by mouth 2 (two) times daily.      furosemide (LASIX) 40 MG tablet Take 1 tablet (40 mg total) by mouth 2 (two) times daily. 60 tablet 11    lisinopril (PRINIVIL,ZESTRIL) 40 MG tablet Take 40 mg by mouth once daily at 6am.  6    pantoprazole (PROTONIX) 40 MG tablet Take 1 tablet (40 mg total) by mouth once daily. 30 tablet 11     No current facility-administered medications for this visit.        Past Medical History:   Diagnosis Date    Diverticulitis large intestine 08/2014    GERD (gastroesophageal reflux disease)     Hypertension     Obesity      Past Surgical History:   Procedure Laterality Date    UMBILICAL HERNIA REPAIR       Family History   Problem Relation Age of Onset    Diabetes Mother     Hypertension Father     Stroke Father      Social History   Substance Use Topics    Smoking status: Former Smoker     Types: Cigarettes     Start date: 1/1/1963     Quit date: 1/1/1971    Smokeless tobacco: None    Alcohol use No        Review of Systems     Review  "of Systems   Constitutional: Negative for fever.  Has complaints of fatigue.  HENT: Negative for congestion and sore throat.    Eyes: Negative for blurred vision, double vision and discharge.   Respiratory: Negative for cough.  See HPI   Cardiovascular: Negative for chest pain, palpitations, claudication, leg swelling and PND.   Gastrointestinal: Negative for abdominal pain, constipation, diarrhea, nausea and vomiting.   Genitourinary: Negative for dysuria, frequency and urgency.   Musculoskeletal: Negative for back pain and myalgias.  He complains of muscle wasting  Skin: Negative for itching and rash.   Neurological: Negative for dizziness, speech change, seizures, weakness and headaches.   Endo/Heme/Allergies: Does not bruise/bleed easily.   Psychiatric/Behavioral: Negative for depression. The patient is not nervous/anxious.        OBJECTIVE:     Vital Signs (Most Recent)  Temp: 98.5 °F (36.9 °C) (05/16/17 1132)  Pulse: (!) 54 (05/16/17 1132)  BP: 121/69 (05/16/17 1132)  SpO2: 95 % (05/16/17 1132)  6' 1" (1.854 m)  104.5 kg (230 lb 6.4 oz)       Physical Exam     Physical Exam   Constitutional: He is oriented to person, place, and time. He appears well-developed and well-nourished.   HENT:   Head: Normocephalic and atraumatic.   Eyes: Pupils are equal, round, and reactive to light.   Neck: Normal range of motion. Neck supple.   Cardiovascular: Normal rate, regular rhythm and normal heart sounds.  + murmur  Pulmonary/Chest: Effort normal and breath sounds normal.   Abdominal: Soft. Bowel sounds are normal.   Musculoskeletal: Normal range of motion.   Neurological: He is alert and oriented to person, place, and time.   Skin: Skin is warm and dry.   Psychiatric: He has a normal mood and affect. His behavior is normal.       Laboratory  reviewed    Diagnostic Results:  2D echo report:  LV severely enlarged. LV systolic function is decreased. The LV EF 20-25%. Hypokinesis of inferior wall and lateral wall.  Mild LV " hypertrophy. Mild mitral annular calcification is noted.  LVOT is 2.3 cm. Trace pulmonic regurg.  Trace AI. Moderate TR, Moderate to severe MR.  His LVEDD is 7.2 cm.    LHC:    LMCA: mid subsection, 50%. LAD-100% stenosis, 1st diag 80% stenosis, LCx, diffuse disease, RCA 99% stenosis.      ASSESSMENT/PLAN:     71 year old male with CAD, chronic systolic CHF (wearing a life vest), moderate to severe MR presents from Dr. Avendano to discuss surgical intervention.  His STS score is 6.8%.  His EF is 20% and his LVEDD is 7.2 cm.    PLAN:    CTS Attending Note:    I have personally taken the history and examined this patient and agree with the NP's note as stated above. 70 yo M with CAD. LAD appears diffusely diseased. Given this finding, and his comorbid conditions, risk > benefit for CABG. Recommend medical management +/- PCI.

## 2017-05-16 NOTE — MR AVS SNAPSHOT
[facility]  [address]  [facility] [address]  Phone: [phone]                  Nimesh Saini   2017 11:00 AM   Appointment    Description:  Male : 1945   Provider:  Terrance Augustin MD   Department:  [facility]                To Do List           Future Appointments        Provider Department Dept Phone    2017 11:00 AM Terrance Augustin MD [facility] [phone]      Goals (5 Years of Data)     None      [facility]     [facility] On Call Nurse Care Line -  Assistance  Unless otherwise directed by your provider, please contact [facility], our nurse care line that is available for  assistance.     Registered nurses in the [facility] Center provide: appointment scheduling, clinical advisement, health education, and other advisory services.  Call: [phone] (toll free)               Medications           Message regarding Medications     Verify the changes and/or additions to your medication regime listed below are the same as discussed with your clinician today.  If any of these changes or additions are incorrect, please notify your healthcare provider.             Verify that the below list of medications is an accurate representation of the medications you are currently taking.  If none reported, the list may be blank. If incorrect, please contact your healthcare provider. Carry this list with you in case of emergency.           Current Medications     carvedilol (COREG) 6.25 MG tablet Take 1 tablet (6.25 mg total) by mouth 2 (two) times daily.    furosemide (LASIX) 40 MG tablet Take 1 tablet (40 mg total) by mouth 2 (two) times daily.    lisinopril (PRINIVIL,ZESTRIL) 20 MG tablet Take 2 tablets (40 mg total) by mouth once daily.    pantoprazole (PROTONIX) 40 MG tablet Take 1 tablet (40 mg total) by mouth once daily.    PROAIR HFA 90 mcg/actuation inhaler Inhale 2 puffs into the  lungs every 6 (six) hours as needed.            Clinical Reference Information           Allergies as of 5/16/2017     No Known Allergies      Immunizations Administered on Date of Encounter - 5/16/2017     None      Language Assistance Services     ATTENTION: Language assistance services are available, free of charge. Please call [phone].      ATENCIÓN: Si habla jayme, tiene a jefferson disposición servicios gratuitos de asistencia lingüística. Llame al [phone].     CHÚ Ý: N?u b?n nói Ti?ng Vi?t, có các d?ch v? h? tr? ngôn ng? mi?n phí dành cho b?n. G?i s? [phone].         [facility] complies with applicable Federal civil rights laws and does not discriminate on the basis of race, color, national origin, age, disability, or sex.

## 2017-05-16 NOTE — Clinical Note
May 18, 2017      Jan Avendano MD  [address]  [address]  Cardiovascular [facility] [address]           [facility]  [address]  [facility] [address]  Phone: [phone]          Patient: Nimesh Saini   MR Number: [id number]   YOB: 1945   Date of Visit: 5/16/2017       Dear Dr. Jan Avendano:    Thank you for referring Nimesh Saini to me for evaluation. Attached you will find relevant portions of my assessment and plan of care.    If you have questions, please do not hesitate to call me. I look forward to following Nimesh Saini along with you.    Sincerely,    Terrance Augustin MD    Enclosure  CC:  No Recipients    If you would like to receive this communication electronically, please contact [email or web address] or [phone] to request more information on [employer] access.    For providers and/or their staff who would like to refer a patient to [facility], please contact us through our one-stop-shop provider referral line, [facility], at [phone].    If you feel you have received this communication in error or would no longer like to receive these types of communications, please e-mail [email or web address]

## 2017-06-05 ENCOUNTER — TELEPHONE (OUTPATIENT)
Dept: GASTROENTEROLOGY | Facility: CLINIC | Age: 72
End: 2017-06-05

## 2017-06-05 NOTE — TELEPHONE ENCOUNTER
Spoke with Amie in [facility]. She will call patient to get required information and will begin mail order for patient's Protonix.

## 2017-06-06 PROBLEM — R07.9 CHEST PAIN: Status: ACTIVE | Noted: 2017-06-06

## 2017-06-19 ENCOUNTER — TELEPHONE (OUTPATIENT)
Dept: FAMILY MEDICINE | Facility: CLINIC | Age: 72
End: 2017-06-19

## 2017-06-19 NOTE — TELEPHONE ENCOUNTER
Pt reports he is not having any problems with breathing at this time, will call and report if any problems breathing arise to get pulmonary referral.

## 2017-06-19 NOTE — TELEPHONE ENCOUNTER
His chest X-Ray still shows a small amount of fluid in the Right chest and he has signs on the X-Ray that he is not completely expanding his right lung. If he is still having shortness of breath a pulmonary referral is recommended.

## 2017-06-21 ENCOUNTER — TELEPHONE (OUTPATIENT)
Dept: FAMILY MEDICINE | Facility: CLINIC | Age: 72
End: 2017-06-21

## 2017-06-21 DIAGNOSIS — J90 PLEURAL EFFUSION: Primary | ICD-10-CM

## 2017-07-12 ENCOUNTER — HOSPITAL ENCOUNTER (OUTPATIENT)
Dept: PULMONOLOGY | Facility: CLINIC | Age: 72
Discharge: HOME | End: 2017-07-12
Payer: MEDICARE

## 2017-07-12 ENCOUNTER — OFFICE VISIT (OUTPATIENT)
Dept: PULMONOLOGY | Facility: CLINIC | Age: 72
End: 2017-07-12
Payer: MEDICARE

## 2017-07-12 VITALS
RESPIRATION RATE: 14 BRPM | OXYGEN SATURATION: 97 % | HEIGHT: 73 IN | WEIGHT: 241.38 LBS | BODY MASS INDEX: 31.99 KG/M2 | DIASTOLIC BLOOD PRESSURE: 90 MMHG | HEART RATE: 59 BPM | SYSTOLIC BLOOD PRESSURE: 142 MMHG

## 2017-07-12 DIAGNOSIS — R06.02 SHORTNESS OF BREATH: ICD-10-CM

## 2017-07-12 DIAGNOSIS — Z77.090 HISTORY OF ASBESTOS EXPOSURE: ICD-10-CM

## 2017-07-12 DIAGNOSIS — J90 PLEURAL EFFUSION: ICD-10-CM

## 2017-07-12 DIAGNOSIS — I25.10 CAD, MULTIPLE VESSEL: ICD-10-CM

## 2017-07-12 DIAGNOSIS — R07.89 CHEST PAIN, ATYPICAL: ICD-10-CM

## 2017-07-12 DIAGNOSIS — K44.9 HIATAL HERNIA WITH GERD: ICD-10-CM

## 2017-07-12 DIAGNOSIS — I25.5 ISCHEMIC CARDIOMYOPATHY: Primary | ICD-10-CM

## 2017-07-12 DIAGNOSIS — K21.9 HIATAL HERNIA WITH GERD: ICD-10-CM

## 2017-07-12 LAB
PRE FEV1 FVC: 80
PRE FEV1: 2.42
PRE FVC: 3.03
PREDICTED FEV1 FVC: 78
PREDICTED FEV1: 3.62
PREDICTED FVC: 4.55

## 2017-07-12 PROCEDURE — 94729 DIFFUSING CAPACITY: CPT | Mod: 26,S$PBB,, | Performed by: INTERNAL MEDICINE

## 2017-07-12 PROCEDURE — 1126F AMNT PAIN NOTED NONE PRSNT: CPT | Mod: ,,, | Performed by: INTERNAL MEDICINE

## 2017-07-12 PROCEDURE — 99204 OFFICE O/P NEW MOD 45 MIN: CPT | Mod: 25,S$PBB,, | Performed by: INTERNAL MEDICINE

## 2017-07-12 PROCEDURE — 1159F MED LIST DOCD IN RCRD: CPT | Mod: ,,, | Performed by: INTERNAL MEDICINE

## 2017-07-12 PROCEDURE — 94010 BREATHING CAPACITY TEST: CPT | Mod: 26,S$PBB,, | Performed by: INTERNAL MEDICINE

## 2017-07-12 PROCEDURE — 99999 PR PBB SHADOW E&M-EST. PATIENT-LVL IV: CPT | Mod: PBBFAC,,, | Performed by: INTERNAL MEDICINE

## 2017-07-12 NOTE — LETTER
July 12, 2017      Stephane Fuentes Jr., MD  [address]  [place] [address]           [facility]  [address]  [place] [address]  Phone: [phone]          Patient: Nimesh Saini   MR Number: [id number]   YOB: 1945   Date of Visit: 7/12/2017       Dear Dr. Stephane Fuentes Jr.:    Thank you for referring Nimesh Saini to me for evaluation. Attached you will find relevant portions of my assessment and plan of care.    If you have questions, please do not hesitate to call me. I look forward to following Nimesh Saini along with you.    Sincerely,    SOPHIA Mayo MD    Enclosure  CC:  No Recipients    If you would like to receive this communication electronically, please contact [email or web address] or [phone] to request more information on [facility] [facility] Link access.    For providers and/or their staff who would like to refer a patient to [facility], please contact us through our one-stop-shop provider referral line, [facility], at [phone].    If you feel you have received this communication in error or would no longer like to receive these types of communications, please e-mail [email or web address]          Essential hypertension

## 2017-07-12 NOTE — PROGRESS NOTES
"Subjective:       Patient ID: Nimesh Saini is a 71 y.o. male.    Chief Complaint: Shortness of Breath and Pleural Effusion    HPI HISTORY OF PRESENT ILLNESS:  Mr. Saini is a 71-year-old remote former smoker,   who comes for assessment of shortness of breath and a pleural effusion.  He is a   somewhat difficult historian.  He reports shortness of breath with exertion,   which has been present for a month or longer.  He has also had a morning cough   with associated sputum production.  He has not been aware of wheezing, he has   not had hemoptysis or any pleuritic pain.  He does describe an "aching" pain in   the posterolateral aspect of his chest on the right side.  This seems to be   worse in recent months.  This discomfort is brought on by exertion and resolves   with rest.  He does not know of any proven past lung diseases.    Mr. Saini has the history of coronary artery disease and an ischemic   cardiomyopathy.  He also has valvular heart disease.  He has been deemed not to   be a candidate for coronary artery bypass surgery and recently had placement of   multiple coronary stents.  Following that, he has been placed on Plavix.  He   does note that his respiratory symptoms are improved since this cardiac   intervention.    Mr. Saini has also had a recent evaluation by a gastroenterologist due to   weight loss.  He is now on treatment for gastroesophageal reflux and his weight   seems to have stabilized.    Mr. Saini estimates that he smoked no more than one quarter pack of cigarettes   per day for 10 years.  He discontinued smoking in 1971.  He does not know of any   family history for lung disease.  He served in the  for a number of   years and believes that he had exposure to Agent Orange.  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    DATA:  I have reviewed the images from a chest x-ray done within the last   several weeks.  The cardiac " silhouette is at the upper limit of normal in size.    There is a modest right pleural effusion.  This appears decreased in volume   when compared to a chest radiograph from April.  Incidental note is made of a   calcified granuloma in the lateral portion of the left lung.  Elsewhere, the   lung fields appear clear.    There also are images from the abdominal CT, which was obtained in March.  This   study showed bilateral pleural effusions.  There do not appear to be any pleural   calcifications.  There was mild basilar atelectasis associated with effusions   at the time of this abdominal scan.    Finalizing this dictation was delayed by the recent disruption in transcription services.    For expediency, it is being signed without review.      CB/HN  dd: 07/22/2017 13:47:21 (CDT)  td: 07/22/2017 15:13:27 (CDT)  Doc ID   #[id number]  Job ID #[id number]    CC:       Review of Systems   Constitutional: Positive for weight loss. Negative for fever and fatigue.   HENT: Negative for postnasal drip, sinus pressure, voice change and congestion.    Respiratory: Positive for shortness of breath and dyspnea on extertion. Negative for cough, sputum production and wheezing.    Cardiovascular: Positive for chest pain. Negative for leg swelling.   Genitourinary: Negative for difficulty urinating.   Musculoskeletal: Positive for arthralgias. Negative for back pain.   Skin: Negative for rash.   Gastrointestinal: Positive for acid reflux. Negative for abdominal pain.   Neurological: Negative for dizziness and weakness.   Hematological: Negative for adenopathy.       Objective:      Physical Exam   Constitutional: He is oriented to person, place, and time. He appears well-developed and well-nourished.   HENT:   Head: Normocephalic.   Mouth/Throat: Oropharynx is clear and moist. No oropharyngeal exudate.   Neck: Normal range of motion. Neck supple. No JVD present. No tracheal deviation present. No thyromegaly present.   Cardiovascular: Normal  rate, regular rhythm and normal heart sounds.    No murmur heard.  Pulmonary/Chest: Symmetric chest wall expansion. No stridor. He has decreased breath sounds (decreased air exchange at R base). He has no wheezes. He has no rhonchi. He has no rales. He exhibits no tenderness.   Abdominal: Soft.   Musculoskeletal: He exhibits no edema or tenderness.   Lymphadenopathy:     He has no cervical adenopathy.   Neurological: He is alert and oriented to person, place, and time.   Skin: Skin is warm and dry. No rash noted. No erythema. Nails show no clubbing.   Psychiatric: He has a normal mood and affect.   Vitals reviewed.    Personal Diagnostic Review    No flowsheet data found.      Assessment:       1. Ischemic cardiomyopathy    2. Shortness of breath    3. Hiatal hernia with GERD    4. Chest pain, atypical    5. Pleural effusion    6. CAD, multiple vessel    7. History of asbestos exposure        Outpatient Encounter Prescriptions as of 7/12/2017   Medication Sig Dispense Refill    albuterol (PROAIR HFA) 90 mcg/actuation inhaler Inhale 2 puffs into the lungs every 6 (six) hours as needed for Wheezing. Rescue      aspirin (ECOTRIN) 81 MG EC tablet Take 81 mg by mouth once daily.      atorvastatin (LIPITOR) 80 MG tablet Take 80 mg by mouth once daily.      carvedilol (COREG) 6.25 MG tablet Take 1 tablet (6.25 mg total) by mouth 2 (two) times daily.      clopidogrel (PLAVIX) 75 mg tablet Take 75 mg by mouth once daily.      furosemide (LASIX) 40 MG tablet Take 1 tablet (40 mg total) by mouth 2 (two) times daily. 60 tablet 11    lisinopril (PRINIVIL,ZESTRIL) 40 MG tablet Take 40 mg by mouth once daily at 6am.  6    pantoprazole (PROTONIX) 40 MG tablet Take 1 tablet (40 mg total) by mouth once daily. 30 tablet 11     No facility-administered encounter medications on file as of 7/12/2017.      Orders Placed This Encounter   Procedures    CT Chest Without Contrast     Standing Status:   Future     Standing Expiration  Date:   7/12/2018    Spirometry without bronchodilator     Standing Status:   Future     Number of Occurrences:   1     Standing Expiration Date:   7/12/2018    DLCO-Carbon Monoxide Diffusing Capacity     Standing Status:   Future     Number of Occurrences:   1     Standing Expiration Date:   7/12/2018     Plan:     Spirometry, DLCO, and CT Chest (phone report).  Consider trial with bronchodilator, if PFTs confirm obstruction.  Monitor pleural fluid without further investigation as long as volume remains stable/decreasing.

## 2017-07-12 NOTE — PATIENT INSTRUCTIONS
Spirometry, DLCO, and CT Chest (phone report).  Consider trial with bronchodilator, if PFTs confirm obstruction.  Monitor pleural fluid without further investigation as long as volume remains stable/decreasing.

## 2017-07-14 ENCOUNTER — HOSPITAL ENCOUNTER (OUTPATIENT)
Dept: RADIOLOGY | Facility: HOSPITAL | Age: 72
Discharge: HOME | End: 2017-07-14
Attending: INTERNAL MEDICINE
Payer: MEDICARE

## 2017-07-14 DIAGNOSIS — R07.89 CHEST PAIN, ATYPICAL: ICD-10-CM

## 2017-07-14 DIAGNOSIS — J90 PLEURAL EFFUSION: ICD-10-CM

## 2017-07-14 PROCEDURE — 71250 CT THORAX DX C-: CPT | Mod: TC

## 2017-07-14 PROCEDURE — 71250 CT THORAX DX C-: CPT | Mod: 26,GC,, | Performed by: RADIOLOGY

## 2017-07-25 ENCOUNTER — TELEPHONE (OUTPATIENT)
Dept: PULMONOLOGY | Facility: CLINIC | Age: 72
End: 2017-07-25

## 2017-07-25 DIAGNOSIS — R91.1 LUNG NODULE: ICD-10-CM

## 2017-07-25 NOTE — TELEPHONE ENCOUNTER
----- Message from Kirill Spivey sent at 7/25/2017 11:01 AM CDT -----  Contact: Pt   Pt would like a call back from nurse in ref to most recent test results    Pt can be reached at [phone]

## 2017-07-26 NOTE — TELEPHONE ENCOUNTER
Pt informed that recent PFTs show mild reduction in vital capacity, but no obstruction.  DLCO is within range of normal.  Low VC may reflect obesity, residual pleural abnormalities, and chronic CV disease.  No clear evidence to support treatment with bronchodilators.  Ct shows a modest R effusion/pleural scarring, but this appears improved compared to previous imaging.  Note made of a nodular density at L lung base.  The significance of this is unproven.  I have asked him to have return visit in 3 months with repeat CT Chest before.  Call/return sooner if resp symptoms worsen.

## 2017-08-30 ENCOUNTER — OFFICE VISIT (OUTPATIENT)
Dept: CARDIOLOGY | Facility: CLINIC | Age: 72
End: 2017-08-30
Payer: MEDICARE

## 2017-08-30 VITALS
SYSTOLIC BLOOD PRESSURE: 120 MMHG | DIASTOLIC BLOOD PRESSURE: 86 MMHG | WEIGHT: 263.38 LBS | HEART RATE: 62 BPM | HEIGHT: 73 IN | BODY MASS INDEX: 34.91 KG/M2 | OXYGEN SATURATION: 97 %

## 2017-08-30 DIAGNOSIS — I10 ESSENTIAL HYPERTENSION: Chronic | ICD-10-CM

## 2017-08-30 DIAGNOSIS — I25.10 CAD, MULTIPLE VESSEL: ICD-10-CM

## 2017-08-30 DIAGNOSIS — R06.02 SHORTNESS OF BREATH: ICD-10-CM

## 2017-08-30 DIAGNOSIS — I50.41 ACUTE COMBINED SYSTOLIC AND DIASTOLIC CHF, NYHA CLASS 3: ICD-10-CM

## 2017-08-30 DIAGNOSIS — I25.5 ISCHEMIC CARDIOMYOPATHY: Primary | ICD-10-CM

## 2017-08-30 PROCEDURE — 99213 OFFICE O/P EST LOW 20 MIN: CPT | Mod: PBBFAC,PO | Performed by: INTERNAL MEDICINE

## 2017-08-30 PROCEDURE — 1159F MED LIST DOCD IN RCRD: CPT | Mod: ,,, | Performed by: INTERNAL MEDICINE

## 2017-08-30 PROCEDURE — 99215 OFFICE O/P EST HI 40 MIN: CPT | Mod: S$PBB,,, | Performed by: INTERNAL MEDICINE

## 2017-08-30 PROCEDURE — 99999 PR PBB SHADOW E&M-EST. PATIENT-LVL III: CPT | Mod: PBBFAC,,, | Performed by: INTERNAL MEDICINE

## 2017-08-30 PROCEDURE — 3079F DIAST BP 80-89 MM HG: CPT | Mod: ,,, | Performed by: INTERNAL MEDICINE

## 2017-08-30 PROCEDURE — 1126F AMNT PAIN NOTED NONE PRSNT: CPT | Mod: ,,, | Performed by: INTERNAL MEDICINE

## 2017-08-30 PROCEDURE — 3074F SYST BP LT 130 MM HG: CPT | Mod: ,,, | Performed by: INTERNAL MEDICINE

## 2017-08-30 RX ORDER — NITROGLYCERIN 0.4 MG/1
TABLET SUBLINGUAL
Refills: 3 | COMMUNITY
Start: 2017-06-07

## 2017-08-30 NOTE — PROGRESS NOTES
Chief Complaint:  Nimesh Saini is a 71 y.o. male who presents for evaluation of Consult      HPI:   Mr. Saini is a 71 year-old male who was referred by Dr. Avendano for evaluation for ICD.  He was admitted to [facility] in May 2017 with NSTEMI and Class III CHF.  Discovered to have 3V CAD with LAD , distal LCX disease and high grade RCA.  He saw Dr. Augustin at [facility], declined CABG due to diffusely diseased LAD, presumably poor LIMA target.  In early 2017 he underwent PCI of the RCA/RPDA/RPL with JAKUB x 4 and PTOA-CSI.  His EF remained reduced < 30% at that time.  It does not appear that his LAD was revascularized - may be that it is nonintervenable.  Patient reports improvement in angina and CHF symptoms since RCA PCI, though he remains with some dyspnea as a solid Fc II.  Reports compliance with therapy. Currently has a lifevest in place since about May - no events nor prior history of syncope or SCA.  An ECG from 2017 showed anterior infarct with NSIVCD with QRS of 144 msec.      Patient Active Problem List    Diagnosis Date Noted    Lung nodule 2017    Chest pain, atypical 2017    Pleural effusion 2017    History of asbestos exposure 2017    Shortness of breath 2017    Ischemic cardiomyopathy 2017    CAD, multiple vessel 2017    Acute combined systolic and diastolic CHF, NYHA class 3 2017    Hiatal hernia with GERD 2017    Epigastric abdominal pain 2017    Erectile dysfunction 2014    Obesity (BMI 30.0-34.9) 2014    Hypertension 2014       Right Arm BP - Sittin/86  Left Arm BP - Sittin/86    Current Outpatient Prescriptions   Medication Sig    aspirin (ECOTRIN) 81 MG EC tablet Take 81 mg by mouth once daily.    atorvastatin (LIPITOR) 80 MG tablet Take 80 mg by mouth once daily.    furosemide (LASIX) 40 MG tablet Take 1 tablet (40 mg total) by mouth 2 (two) times daily.    lisinopril (PRINIVIL,ZESTRIL) 40  MG tablet Take 40 mg by mouth once daily at 6am.    nitroGLYCERIN (NITROSTAT) 0.4 MG SL tablet DIS 1 T UNT Q FIVE MINUTES UP TO THREE DOSES PRF CHEST PAIN    pantoprazole (PROTONIX) 40 MG tablet Take 1 tablet (40 mg total) by mouth once daily.    albuterol (PROAIR HFA) 90 mcg/actuation inhaler Inhale 2 puffs into the lungs every 6 (six) hours as needed for Wheezing. Rescue    carvedilol (COREG) 6.25 MG tablet Take 1 tablet (6.25 mg total) by mouth 2 (two) times daily.    clopidogrel (PLAVIX) 75 mg tablet Take 75 mg by mouth once daily.     No current facility-administered medications for this visit.        Review of Systems   Constitution: Negative for diaphoresis, weakness, malaise/fatigue, weight gain and weight loss.   HENT: Negative for nosebleeds.    Cardiovascular: Negative for chest pain, claudication, cyanosis, dyspnea on exertion, irregular heartbeat, leg swelling, near-syncope, orthopnea, palpitations, paroxysmal nocturnal dyspnea and syncope.   Respiratory: Negative for cough, hemoptysis, shortness of breath, sleep disturbances due to breathing, snoring, sputum production and wheezing.    Hematologic/Lymphatic: Negative for bleeding problem. Does not bruise/bleed easily.   Skin: Negative for poor wound healing and rash.   Musculoskeletal: Negative for myalgias.   Gastrointestinal: Negative for abdominal pain, heartburn, hematemesis, hematochezia, hemorrhoids and melena.   Neurological: Negative for dizziness, focal weakness, light-headedness and loss of balance.   Psychiatric/Behavioral: Negative for altered mental status, depression and memory loss.         Objective:     Physical Exam   Constitutional: He is oriented to person, place, and time. He appears well-developed and well-nourished. No distress. He is not intubated.   HENT:   Head: Atraumatic.   Neck: No JVD present.   Cardiovascular: Normal rate, regular rhythm, S1 normal, S2 normal, normal heart sounds and intact distal pulses.  PMI is not  displaced.  Exam reveals no gallop, no S3, no S4, no distant heart sounds, no friction rub, no midsystolic click and no opening snap.    No murmur heard.  Pulses:       Carotid pulses are 2+ on the right side, and 2+ on the left side.       Radial pulses are 2+ on the right side, and 2+ on the left side.   Pulmonary/Chest: Effort normal and breath sounds normal. No accessory muscle usage. No apnea, no tachypnea and no bradypnea. He is not intubated. No respiratory distress. He has no decreased breath sounds. He has no wheezes. He has no rhonchi. He has no rales. He exhibits no tenderness.   Abdominal: Soft. Normal aorta and bowel sounds are normal. He exhibits no distension, no abdominal bruit, no pulsatile midline mass and no mass. There is no tenderness.   Musculoskeletal: He exhibits no edema.   Neurological: He is alert and oriented to person, place, and time.   Skin: Skin is warm. No rash noted. No erythema. No pallor.   Psychiatric: He has a normal mood and affect. His behavior is normal. Judgment and thought content normal.   Vitals reviewed.      LABS REVIEWED    ECHOCARDIOGRAM:  See media    ECG:    STRESS TESTING:    CARDIAC CATHETERIZATION: see media     Assessment:     1. Ischemic cardiomyopathy    2. Acute combined systolic and diastolic CHF, NYHA class 3    3. CAD, multiple vessel    4. Essential hypertension    5. Shortness of breath      In summary, Mr. Saini has ischemic cardiomyopathy with EF < 30% and is now nearing 90 days post revascularization.  He has been on GDMT for ACC/AHA stage C/NYHA class II - III CHF to include Carvedilol and lisinopril.      I will collaborate with Dr. Avendano regarding the status of his LAD/LCX territories and whether these vessels are felt to be intervenable and/or viable.  If neither then he meets criteria for ICD implant for primary prevention of SCA which is a Class I indication per the 2008 ACC/HRS device based therapy guidelines:    ICD therapy is indicated in  patients with LVEF less than or equal to  35% due to prior MI who are at least 40 days post-MI and are in  NYHA functional Class II or III. (Level of Evidence: A) (16,333)    He may also benefit from CRT.  Will need to reassess his ECG and QRS.  In [place] he had NSIVCD with QRS of 144 msec, which is close to satisfying criteria per the ACC/AHA/HRS 2012 focused update on device based therapy - which would be a class IIa indication for CRT with NSIVCD and QRS width of 150 msec or greater and NYHA class III or greater CHF.  Alternatively, if his QRS is < 150 msec with NSIVCD, it would be a Class IIb indication for CRT:    CRT can be useful for patients who have LVEF less than or equal to 35%, sinus  rhythm, a non-LBBB pattern with a QRS duration greater than or equal to 150  ms, and NYHA class III/ambulatory class IV symptoms on GDMT (16-18,21).  (Level of Evidence: A)    CRT may be considered for patients who have LVEF less than or equal to 35%, sinus  rhythm, a non-LBBB pattern with QRS duration 120 to 149 ms, and NYHA class  III/ambulatory class IV on GDMT (21,30). (Level of Evidence: B)    Mayra et al. [employer] Vol. 51, No. 21, 2008  ACC/AHA/HRS Guidelines for Device-Based Therapy May 27, 2008:e1-62    [employer] Vol. 60, No. 14, 2012 Ashley et al. 1303  October 2, 2012:[id number] 2012 Device-Based Therapy Guideline Focused Update  Plan:     -Will clarify feasibility of intervention of his non-revascularized LAD territory with Dr. Avendano.  -If not viable or intevenable, will schedule for [employer] Surescan MRI compatible ICD in mid Sept.  -Will reassess ECG and apply CRT if appropriate.  -We will call patient to execute these plans.      -In today's visit, at least 4 established conditions that pose a risk to life or bodily function have been addressed and the conditions are severe.        Continue with current medical plan and lifestyle changes.    I have reviewed the patient's medical history in detail and updated the  computerized patient record.    No orders of the defined types were placed in this encounter.      Follow up as scheduled. Return sooner for concerns or questions      He expressed verbal understanding and agreed with the plan          Mahamed Jo MD, FACC, CCDS  Interventional Cardiology  [facility]

## 2017-08-30 NOTE — PATIENT INSTRUCTIONS
Biventricular Pacemaker and ICD (Biventricular ICD)     You have a condition called heart failure. It is also known as congestive heart failure (CHF). This condition causes symptoms such as getting tired very quickly and being short of breath. To help treat these symptoms, your healthcare provider is recommending a biventricular pacemaker and implantable cardioverter defibrillator (ICD). This is sometimes called a biventricular ICD. Or it is called cardiac resynchronization pacing with an ICD (CRT-D).  A biventricular pacemaker and ICD is a small, lightweight device powered by batteries. This device helps keep your heart pumping normally. It also protects you from dangerous heart rhythms. Read on to learn more about this device and how it works.  Understanding the heart  The heart is made up of 4 sections (chambers) that pump to move blood through the heart. The top 2 chambers are the left atrium and right atrium. These are filling chambers of the heart. The bottom chambers are the left ventricle and right ventricle. These are the pumping chambers of the heart. The heart has an electrical system. This system sends signals that make the atria and ventricles work together. This causes the heart to beat and move blood through the heart and lungs and out to the body.  How the device works  Heart failure weakens your heart muscle. As a result, the ventricles dont pump as strongly as they should.  The pathways that carry the heart's electrical signals are located in the heart muscle. They can also be damaged by CHF. This can cause a bundle branch block. A bundle branch block can throw off the timing of the heart's contraction. This can make the heart's squeezing contraction even weaker.  A biventricular pacemaker and ICD help keep the heart pumping in a more normal way. The pacemaker device keeps the heart from beating too slowly. It tries to restore the normal squeezing pattern of the heart. This is called  "resynchronization pacing. This can lead to more efficient and stronger heart contraction. The ICD part of the device detects dangerously fast heart rhythms and stops them. If the device detects an abnormally fast heartbeat that can cause cardiac arrest, it will send a "shock" to the heart. The shock stops this dangerous heart rhythm and restores a normal heartbeat.  Before the procedure  Make sure to:  · Not eat or drink after midnight or 8 hours before your surgery  · Follow instructions from your doctor about bathing the night before and the morning of your procedure. You may need to use a special cleaning solution.  · Tell your doctor what medicines you take. This includes over-the-counter medicines such as ibuprofen. It also includes vitamins, herbs, and other supplements. You may need to stop taking some medicines before the procedure, such as blood thinners and aspirin.  · Tell your doctor if you are sensitive or allergic to anything. This includes medicines, latex, tape, and anesthetic medicines.  · Ask a family member or friend to take you home from the hospital  Tests before your procedure  Before your procedure you may need tests such as:  · Chest X-ray  · Blood tests, to test your general health  During the procedure  · You will get medicine (anesthesia) so you wont feel pain. Most likely, you will get medicine (sedation) that will make you drowsy or lightly asleep. The doctor will inject local pain medicine to numb the skin on your chest where the cut (incision) will be made.  · The doctor will make an incision where the device will be implanted. This is most often on the left side of the chest just below the collarbone (clavicle).  · The doctor makes a small pocket for the generator under the skin.  · The doctor will put a thin, flexible tube (catheter) into a vein leading to the right atrium. He or she will guide the devices wires (leads) through the catheter to the heart. The doctor will use an " X-ray monitor to move the leads into the right ventricle. He or she will usually also put a lead in the right atrium.  · The doctor will put the lead for the left ventricle into a vein that runs along the outside of this chamber. He or she will also use the X-ray monitor to put this lead in the correct spot. The device will stimulate the left ventricle from the outside. The other leads will stimulate the heart from inside the chambers.  · The doctor will attach the generator to the leads. He or she will send pulses through the leads to test the generator. This testing may cause your heart to race.  · The doctor will then put the generator into the pocket under your skin.  · The doctor will program the device based on the heart rate thats best for you. He or she will check the device to see that it is working.  · The doctor will close the skin with stitches (sutures), surgical glue, or staples. Any stitches used will dissolve over time. If glue is used, it will seal the cut and prevent infection. A bandage will be put on the area.  After the procedure  You will spend several hours in a recovery room. Once you are stable and awake, you will be put in a room that can monitor your heart rhythm. Your healthcare team will also watch your breathing and other vital signs. Youll be given pain medicine if you need it.  The team may place the arm on the side of the device in a sling. This is just for a short time, to keep the arm and shoulder still.  You will have a chest X-ray to make sure the leads are where they should be. The doctor will also check that your lungs were not injured during the procedure.  You can resume a normal diet as soon as you are able. You will be watched overnight. The team will check the device the next morning. You will likely go home the day after your procedure. You may need to take antibiotics for several days to prevent infection.  Recovering at home  Follow all the instructions your  healthcare provider gives you for medicines, bathing, exercise, diet, and wound care. Ask your doctor when you can go back to work or start driving again.  Dont raise your arm above your shoulder on the side of your incision until your doctor says its OK to do so. This gives the leads a chance to secure themselves inside your heart.  Follow-up care  Make sure to keep all your follow-up appointments. This is so your doctor can download information from your device and check its settings. Be sure to tell your doctor how the device is working for you.  Most devices can now be connected to a wireless home monitoring system via the internet. The monitor can download information from your device and send it to your doctor. This lets your doctor make sure the device is working as it should.  Life with a biventricular pacemaker and ICD  · Carry an ID card. When you first get your pacemaker, youll be given an ID card to carry. This card contains important information about the device. Show it to any doctor, dentist, or other provider you visit. Pacemakers may set off metal detectors. So you may need to show your card to security personnel.  · Be careful when using a cell phone. Hold it to the ear farthest from your pacemaker. Dont carry the phone in your breast pocket, even when its turned off.  · Avoid very strong magnets. These include those used for an MRI or in hand-held security wands. Show your ID card when you go through security.  · Avoid strong electrical fields. These are made by radio transmitting towers and ham radios. They are also made by heavy-duty electrical equipment. A running engine makes an electrical field. Don't lean over the open jimenez of a running car. Most household and yard appliances will not cause any problems. If you use any large power tools, such as an industrial , talk to your doctor.   · Keep an eye on the battery. The battery inside the device is checked regularly. It will last  5 to 7 years, depending on how often it paces or has to stop dangerous heart rhythms. Once it starts to run down, you will need to have it changed. This is like the implantation surgery, but it takes less time. This is because the battery/generator is the only part that needs to be replaced.  · Be careful when driving. Your device has a defibrillator built in. You may not be allowed to drive for the first 6 months after you get the device. You may also not be allowed to drive for 6 months after the defibrillator delivers a shock. You will not be allowed to work as a  if you have an ICD.     When should I call my healthcare provider?  Call 911 if you have chest pain or trouble breathing.  Call your healthcare provider if you have any of the following:  · Redness, severe swelling, severe pain, drainage, bleeding, or warmth at the incision site  · Incision site or opens up or does not heal well  · A fever of 100.4°F (38.0°C) or higher, or as directed by your healthcare provider  · Pain around the generator that gets worse  · Swelling in the arms or hands on the side of the incision  · Sudden weight gain  · Twitching chest or abdominal muscles  · Frequent or constant hiccups  · A shock from your device  · Very fast heartbeat that doesnt stop  · Generator feels loose or like it is wiggling in the pocket under the skin    Date Last Reviewed: 3/17/2016  © 2997-6185 [employer]. [address] [place] [address], [place] [id number]. All rights reserved. This information is not intended as a substitute for professional medical care. Always follow your healthcare professional's instructions.

## 2017-08-30 NOTE — LETTER
August 30, 2017      Jan Avendano MD  [address]   [id number]  Cardiovascular [place] [facility] [address]           [facility]  [address], [address]  [facility] [address]  Phone: [phone]          Patient: Nimesh Saini   MR Number: [id number]   YOB: 1945   Date of Visit: 8/30/2017       Dear Dr. Jan Avendano:    Thank you for referring Nimesh Saini to me for evaluation. Attached you will find relevant portions of my assessment and plan of care.    If you have questions, please do not hesitate to call me. I look forward to following Nimesh Saini along with you.    Sincerely,    Mahamed Jo MD    Enclosure  CC:  No Recipients    If you would like to receive this communication electronically, please contact [email or web address] or [phone] to request more information on [employer] access.    For providers and/or their staff who would like to refer a patient to [facility], please contact us through our one-stop-shop provider referral line, [facility], at [phone].    If you feel you have received this communication in error or would no longer like to receive these types of communications, please e-mail [email or web address]

## 2017-09-16 DIAGNOSIS — I25.10 CAD, MULTIPLE VESSEL: ICD-10-CM

## 2017-09-16 DIAGNOSIS — I25.5 ISCHEMIC CARDIOMYOPATHY: ICD-10-CM

## 2017-09-16 DIAGNOSIS — I50.41 ACUTE COMBINED SYSTOLIC AND DIASTOLIC CHF, NYHA CLASS 3: Primary | ICD-10-CM

## 2017-09-18 ENCOUNTER — OFFICE VISIT (OUTPATIENT)
Dept: CARDIOLOGY | Facility: CLINIC | Age: 72
End: 2017-09-18
Payer: MEDICARE

## 2017-09-18 VITALS
WEIGHT: 248.81 LBS | SYSTOLIC BLOOD PRESSURE: 153 MMHG | OXYGEN SATURATION: 97 % | DIASTOLIC BLOOD PRESSURE: 98 MMHG | BODY MASS INDEX: 32.98 KG/M2 | HEIGHT: 73 IN | HEART RATE: 60 BPM

## 2017-09-18 DIAGNOSIS — I25.10 CAD, MULTIPLE VESSEL: ICD-10-CM

## 2017-09-18 DIAGNOSIS — I50.41 ACUTE COMBINED SYSTOLIC AND DIASTOLIC CHF, NYHA CLASS 3: Primary | ICD-10-CM

## 2017-09-18 DIAGNOSIS — R06.02 SHORTNESS OF BREATH: ICD-10-CM

## 2017-09-18 DIAGNOSIS — I25.5 ISCHEMIC CARDIOMYOPATHY: ICD-10-CM

## 2017-09-18 DIAGNOSIS — I10 ESSENTIAL HYPERTENSION: Chronic | ICD-10-CM

## 2017-09-18 PROCEDURE — 99213 OFFICE O/P EST LOW 20 MIN: CPT | Mod: PBBFAC,PO | Performed by: INTERNAL MEDICINE

## 2017-09-18 PROCEDURE — 1159F MED LIST DOCD IN RCRD: CPT | Mod: ,,, | Performed by: INTERNAL MEDICINE

## 2017-09-18 PROCEDURE — 93010 ELECTROCARDIOGRAM REPORT: CPT | Mod: S$PBB,,, | Performed by: INTERNAL MEDICINE

## 2017-09-18 PROCEDURE — 93005 ELECTROCARDIOGRAM TRACING: CPT | Mod: PBBFAC,PO | Performed by: INTERNAL MEDICINE

## 2017-09-18 PROCEDURE — 3077F SYST BP >= 140 MM HG: CPT | Mod: ,,, | Performed by: INTERNAL MEDICINE

## 2017-09-18 PROCEDURE — 99999 PR PBB SHADOW E&M-EST. PATIENT-LVL III: CPT | Mod: PBBFAC,,, | Performed by: INTERNAL MEDICINE

## 2017-09-18 PROCEDURE — 3080F DIAST BP >= 90 MM HG: CPT | Mod: ,,, | Performed by: INTERNAL MEDICINE

## 2017-09-18 PROCEDURE — 99215 OFFICE O/P EST HI 40 MIN: CPT | Mod: S$PBB,,, | Performed by: INTERNAL MEDICINE

## 2017-09-18 PROCEDURE — 1126F AMNT PAIN NOTED NONE PRSNT: CPT | Mod: ,,, | Performed by: INTERNAL MEDICINE

## 2017-09-18 RX ORDER — METOPROLOL SUCCINATE 25 MG/1
25 TABLET, EXTENDED RELEASE ORAL DAILY
COMMUNITY
End: 2020-09-09 | Stop reason: SDUPTHER

## 2017-09-18 NOTE — PATIENT INSTRUCTIONS
Living   Implantable Cardioverter Defibrillator (ICD)  An ICD is a device that is placed permanently inside your body. It monitors your heart rhythm (the speed and pattern of your heartbeat). If this rhythm becomes too fast and potentially dangerous, the ICD sends out electrical signals, either rapid pacing or an electric shock, that help bring the rhythm back to normal. The ICD is put inside your body during a minor surgical procedure called implantation. In most cases, implantation takes about an hour.  How do I get ready for this procedure?  · Dont eat or drink anything after midnight the night before the procedure, or 8 hours before the procedure.  · Follow your healthcare provider's instructions on what medicines to take.  · You may be asked to shower with antibacterial soap the night before your procedure and the morning of the procedure. Ask your provider if he or she wants you to use a certain kind of soap.  · Your provider may ask you to use clean bed sheets and pajamas the night before the procedure.  What happens during the procedure?    The ICD is usually put on the left side of your chest. Putting the ICD in your body (implantation) does not need open heart surgery. This means your chest will not be opened. During the procedure:  · You will be given medicine to help you relax.  · The doctor makes a cut (incision) in the skin below your collarbone. This creates a pocket to hold the ICD.  · The doctor threads a wire (lead) through the incision into a vein in the upper chest. With the help of X-ray monitors, the doctor guides the lead into one of the hearts chambers. Depending on how many leads your ICD has, this process may be repeated to guide leads into other chambers.  · The doctor attaches the leads to the heart muscle so they will stay in place. The leads will be tested to make sure they are placed correctly.  · The battery (generator) is attached to the leads. Then the doctor places the  generator in its pocket under the skin.  · The doctor may start (induce) a fast heart rhythm to test the ICD.  · Antibiotic solution is used to clean the pocket.  · When everything else is done, the incision is closed with sutures that dissolve, medical glue, or staples.  Other implantation sites  In some cases, the ICD can be put elsewhere in the body. This could be on the right side of the chest, or on the left side under the muscle. If one of these is an option for you, your doctor will explain more.  What happens after the procedure?  You will stay in the hospital overnight. While you are in the hospital, your hearts signals are monitored to see how the ICD is working. You can go home when your condition is stable. Once you get home:  · Follow your discharge instructions to care for your incision. Watch for signs of infection (see box).  · Follow any special instructions to care for the side of your body where your ICD was implanted. Your doctor may tell you not to raise that arm above the shoulder for a certain amount of time.  · Youll likely have bruising at the incision site. This is normal and will go away as the incision heals.  · You can probably return to your normal routine soon after implantation. Ask your doctor when you can return to work.  · You may be instructed not to drive for a certain amount of time. Generally, you should not drive for about 6 months after having an ICD implanted, or if the device delivers a shock. Commercial driving with an ICD is not allowed by most state laws because of the risk of losing consciousness from life threatening heart rhythms that this device is designed to treat.  · See your doctor for follow-up visits as recommended.     Call 911  Call 911 if you have:  · Chest pain  · Severe shortness of breath  When should I call my healthcare provider?  Contact your healthcare team if you have any of the following:  · Signs of an infection, such as: fever over 100.4ºF  (38ºC), drainage from the incision, or redness, swelling, or warmth at the incision site  · Twitching chest muscles  · Pain around your ICD that gets worse, not better  · Bleeding from your incision  · Swelling in the arm on the side of the incision site  · Severe swelling of the incision site (bulging up like a golf ball)  · You receive a shock from your ICD  · Your device generator feels loose under the skin or wiggles in the pocket   Date Last Reviewed: 3/30/2016  © 4710-1740 [employer]. [address] [place] [address]. All rights reserved. This information is not intended as a substitute for professional medical care. Always follow your healthcare professional's instructions.        Cell phones dont usually cause problems, but don't carry your phone in your shirt pocket right over the ICD.   Signals that cause problems  To protect your ICD, take special precautions around:  · Cell phones. Always carry a cell phone on the side opposite your ICD and at least 6 inches away from it. While using a cell phone, wear a headset or hold the phone to the ear opposite your ICD.  · Electromagnetic anti-theft systems. These are often near entrances or exits in stores. Walking through one is OK, but avoid standing near or leaning against one.  · Strong electrical fields. These can be caused by radio transmitting towers and heavy-duty electrical equipment (such as arc welders). A running engine also produces an electrical field. Its OK to ride in a car, but avoid leaning over the open jimenez of a running car.  · Very strong magnets. Talk with your heart doctor if another doctor tells you to have an MRI (a medical test that uses magnets). Some ICD devices are considered safe for having an MRI (called MR-conditional devices), but safety precautions must still be used. Magnets in big speakers (such as on a stereo or at a concert) and in hand-held security wands (such as those used at airports) can cause  problems if they come too close to the ICD.  If a signal interferes  If its near one of the signals described above, the ICD could turn off or its settings could reset. You could even get a shock. If you think you were exposed to a signal like this, call your doctor and explain what happened.  Carry an ID card  Youll be given a temporary ID card when you get your ICD. The permanent card will be mailed to you in about 6 weeks. Show this card to any doctor, dentist, or other medical professional you visit. Also show it to guards at the airport. This way, they know to follow special procedures that prevent the security wand from interfering with your ICD.  Driving safety with an ICD  ICD devices are implanted to shock the heart out of a life threatening heart rhythm. These heart rhythms can cause loss of consciousness (syncope). Your healthcare provider will give you directions on if and when it's safe to drive after you have had once of these devices implanted. Generally, you should not drive for 6 months after you have had this device implanted, or after the device has delivered a shock. You should never drive for commercial purposes after you have an ICD implanted. This is often restricted by state laws because of the high risk of passing out and the dangers that poses while driving.  Follow up  Plan on having periodic checkups with your healthcare provider to evaluate the battery life of your ICD. Depending on your device and how much your body uses the pacing functions of the ICD, you will need a new device generator implanted at some point, usually about every 5 to 7 years. On average, this monitoring should happen every 6 months, or as advised by your healthcare provider.  For some devices, the monitoring of the device function and battery life can be done with a remote monitor that can be set up in your home. Remote monitoring systems use the internet or telephone to communicate the information from your  "device to your healthcare provider.  Date Last Reviewed: 5/1/2016  © 8300-2482 [employer]. [address] [place] [address], [place] [address]. All rights reserved. This information is not intended as a substitute for professional medical care. Always follow your healthcare professional's instructions.        Living with an Implantable Cardioverter Defibrillator (ICD)  Your ICD is a device that monitors the electrical signals in your heart. Most ICDs are well protected from other electrical device interference. Microwave ovens and most common household and yard appliances will not cause problems. Signals from some large electric or magnetic fields can make interference "noise" on your ICD. This can cause problems. Possible sources of interference include certain heavy equipment, strong magnets, running motors, and large tools like commercial arc welders. You shouldn't work on your car with the motor running, but it's safe to drive. Check with your doctor about any large, unusual power tools you use.        Cell phones dont usually cause problems, but don't carry your phone in your shirt pocket right over the ICD.   Signals that cause problems  To protect your ICD, take special precautions around:  · Cell phones. Always carry a cell phone on the side opposite your ICD and at least 6 inches away from it. While using a cell phone, wear a headset or hold the phone to the ear opposite your ICD.  · Electromagnetic anti-theft systems. These are often near entrances or exits in stores. Walking through one is OK, but avoid standing near or leaning against one.  · Strong electrical fields. These can be caused by radio transmitting towers and heavy-duty electrical equipment (such as arc welders). A running engine also produces an electrical field. Its OK to ride in a car, but avoid leaning over the open jimenez of a running car.  · Very strong magnets. Talk with your heart doctor if another doctor tells you to have an MRI " (a medical test that uses magnets). Some ICD devices are considered safe for having an MRI (called MR-conditional devices), but safety precautions must still be used. Magnets in big speakers (such as on a stereo or at a concert) and in hand-held security wands (such as those used at airports) can cause problems if they come too close to the ICD.  If a signal interferes  If its near one of the signals described above, the ICD could turn off or its settings could reset. You could even get a shock. If you think you were exposed to a signal like this, call your doctor and explain what happened.  Carry an ID card  Youll be given a temporary ID card when you get your ICD. The permanent card will be mailed to you in about 6 weeks. Show this card to any doctor, dentist, or other medical professional you visit. Also show it to guards at the airport. This way, they know to follow special procedures that prevent the security wand from interfering with your ICD.  Driving safety with an ICD  ICD devices are implanted to shock the heart out of a life threatening heart rhythm. These heart rhythms can cause loss of consciousness (syncope). Your healthcare provider will give you directions on if and when it's safe to drive after you have had once of these devices implanted. Generally, you should not drive for 6 months after you have had this device implanted, or after the device has delivered a shock. You should never drive for commercial purposes after you have an ICD implanted. This is often restricted by state laws because of the high risk of passing out and the dangers that poses while driving.  Follow up  Plan on having periodic checkups with your healthcare provider to evaluate the battery life of your ICD. Depending on your device and how much your body uses the pacing functions of the ICD, you will need a new device generator implanted at some point, usually about every 5 to 7 years. On average, this monitoring should  happen every 6 months, or as advised by your healthcare provider.  For some devices, the monitoring of the device function and battery life can be done with a remote monitor that can be set up in your home. Remote monitoring systems use the internet or telephone to communicate the information from your device to your healthcare provider.  Date Last Reviewed: 5/1/2016  © 1073-0226 [employer]. [address] [place] [address]. All rights reserved. This information is not intended as a substitute for professional medical care. Always follow your healthcare professional's instructions.

## 2017-09-18 NOTE — PROGRESS NOTES
Chief Complaint:  Nimesh Saini is a 71 y.o. male who presents for evaluation of No chief complaint on file.      HPI:   Mr. Saini is a 71 year-old male who was referred by Dr. Avendano for evaluation for ICD.  He was admitted to [facility] in May 2017 with NSTEMI and Class III CHF.  Discovered to have 3V CAD with LAD , distal LCX disease and high grade RCA.  He saw Dr. Augustin at [facility], declined CABG due to diffusely diseased LAD, presumably poor LIMA target.  In early June 2017 he underwent PCI of the RCA/RPDA/RPL with JAKUB x 4 and PTOA-CSI.  His EF remained reduced < 30% at that time.  It does not appear that his LAD was revascularized - may be that it is nonintervenable.  Patient reports improvement in angina and CHF symptoms since RCA PCI, though he remains with some dyspnea as a solid Fc II.  Reports compliance with therapy. Currently has a lifevest in place since about May - no events nor prior history of syncope or SCA.  An ECG from Jan 2017 showed anterior infarct with NSIVCD with QRS of 144 msec.      His initial visit was Aug 30, 2017.  Here for f/u to discuss ICD.  Since last visit, records received and in discussion with his referring cardiologist Dr. Avendano, it does not appear further revascularization of LAD is neither feasible nor beneficial.      Patient Active Problem List    Diagnosis Date Noted    Lung nodule 07/25/2017    Chest pain, atypical 07/12/2017    Pleural effusion 07/12/2017    History of asbestos exposure 07/12/2017    Shortness of breath 05/02/2017    Ischemic cardiomyopathy 05/02/2017    CAD, multiple vessel 05/02/2017    Acute combined systolic and diastolic CHF, NYHA class 3 05/02/2017    Hiatal hernia with GERD 01/17/2017    Epigastric abdominal pain 01/12/2017    Erectile dysfunction 09/12/2014    Obesity (BMI 30.0-34.9) 09/12/2014    Hypertension 08/14/2014            Current Outpatient Prescriptions   Medication Sig    albuterol (PROAIR HFA) 90 mcg/actuation inhaler  Inhale 2 puffs into the lungs every 6 (six) hours as needed for Wheezing. Rescue    aspirin (ECOTRIN) 81 MG EC tablet Take 81 mg by mouth once daily.    atorvastatin (LIPITOR) 80 MG tablet Take 80 mg by mouth once daily.    carvedilol (COREG) 6.25 MG tablet Take 1 tablet (6.25 mg total) by mouth 2 (two) times daily.    clopidogrel (PLAVIX) 75 mg tablet Take 75 mg by mouth once daily.    furosemide (LASIX) 40 MG tablet Take 1 tablet (40 mg total) by mouth 2 (two) times daily.    lisinopril (PRINIVIL,ZESTRIL) 40 MG tablet Take 40 mg by mouth once daily at 6am.    nitroGLYCERIN (NITROSTAT) 0.4 MG SL tablet DIS 1 T UNT Q FIVE MINUTES UP TO THREE DOSES PRF CHEST PAIN    pantoprazole (PROTONIX) 40 MG tablet Take 1 tablet (40 mg total) by mouth once daily.     No current facility-administered medications for this visit.        Review of Systems   Constitution: Negative for diaphoresis, weakness, malaise/fatigue, weight gain and weight loss.   HENT: Negative for nosebleeds.    Cardiovascular: Positive for dyspnea on exertion. Negative for chest pain, claudication, cyanosis, irregular heartbeat, leg swelling, near-syncope, orthopnea, palpitations, paroxysmal nocturnal dyspnea and syncope.   Respiratory: Positive for shortness of breath. Negative for cough, hemoptysis, sleep disturbances due to breathing, snoring, sputum production and wheezing.    Hematologic/Lymphatic: Negative for bleeding problem. Does not bruise/bleed easily.   Skin: Negative for poor wound healing and rash.   Musculoskeletal: Negative for myalgias.   Gastrointestinal: Negative for abdominal pain, heartburn, hematemesis, hematochezia, hemorrhoids and melena.   Neurological: Negative for dizziness, focal weakness, light-headedness and loss of balance.   Psychiatric/Behavioral: Negative for altered mental status, depression and memory loss.         Objective:     Physical Exam   Constitutional: He is oriented to person, place, and time. He appears  well-developed and well-nourished. No distress. He is not intubated.   HENT:   Head: Atraumatic.   Neck: No JVD present.   Cardiovascular: Normal rate, regular rhythm, S1 normal, S2 normal, normal heart sounds and intact distal pulses.  PMI is not displaced.  Exam reveals no gallop, no S3, no S4, no distant heart sounds, no friction rub, no midsystolic click and no opening snap.    No murmur heard.  Pulses:       Carotid pulses are 2+ on the right side, and 2+ on the left side.       Radial pulses are 2+ on the right side, and 2+ on the left side.   Pulmonary/Chest: Effort normal and breath sounds normal. No accessory muscle usage. No apnea, no tachypnea and no bradypnea. He is not intubated. No respiratory distress. He has no decreased breath sounds. He has no wheezes. He has no rhonchi. He has no rales. He exhibits no tenderness.   Abdominal: Soft. Normal aorta and bowel sounds are normal. He exhibits no distension, no abdominal bruit, no pulsatile midline mass and no mass. There is no tenderness.   Musculoskeletal: He exhibits no edema.   Neurological: He is alert and oriented to person, place, and time.   Skin: Skin is warm. No rash noted. No erythema. No pallor.   Psychiatric: He has a normal mood and affect. His behavior is normal. Judgment and thought content normal.   Vitals reviewed.      LABS REVIEWED    ECHOCARDIOGRAM:  See media    ECG:    STRESS TESTING:    CARDIAC CATHETERIZATION: see media     Assessment:     1. Acute combined systolic and diastolic CHF, NYHA class 3    2. CAD, multiple vessel    3. Ischemic cardiomyopathy    4. Shortness of breath    5. Essential hypertension      In summary, Mr. Saini has ischemic cardiomyopathy with EF < 30% and is now 90 days post revascularization.  He has been on GDMT for ACC/AHA stage C/NYHA class III CHF to include Carvedilol and lisinopril.        ICD therapy is indicated in patients with LVEF less than or equal to  35% due to prior MI who are at least 40  days post-MI and are in  NYHA functional Class II or III. (Level of Evidence: A) (16,333)    Today his QRS is 118 msec on ECG so it appears CRT is not indicated.  He does, however, have bradycardia in the past that limited Coreg dosing, so a dual chamber ICD would be beneficial.      Mayra et al. [facility] Vol. 51, No. 21, 2008  ACC/AHA/HRS Guidelines for Device-Based Therapy May 27, 2008:e1-62    [facility] Vol. 60, No. 14, 2012 Ashley et al. 1303  October 2, 2012:[id number] 2012 Device-Based Therapy Guideline Focused Update  Plan:   -Proceed with dual chamber Medtronic ICD on 9/28/2017.    -In today's visit, at least 4 established conditions that pose a risk to life or bodily function have been addressed and the conditions are severe.      Continue with current medical plan and lifestyle changes.    I have reviewed the patient's medical history in detail and updated the computerized patient record.    No orders of the defined types were placed in this encounter.      Follow up as scheduled. Return sooner for concerns or questions      He expressed verbal understanding and agreed with the plan          Mahamed Jo MD, FACC, CCDS  Interventional Cardiology  [facility]

## 2017-09-19 ENCOUNTER — TELEPHONE (OUTPATIENT)
Dept: CARDIOLOGY | Facility: CLINIC | Age: 72
End: 2017-09-19

## 2017-09-19 DIAGNOSIS — I25.5 ISCHEMIC CARDIOMYOPATHY: Primary | ICD-10-CM

## 2017-09-20 NOTE — NURSING
Called and spoke with patient.  Arrival time 7:30am  Pt verbalizes full understanding of all pre op instructions and denies questions.

## 2017-09-21 ENCOUNTER — TELEPHONE (OUTPATIENT)
Dept: CARDIOLOGY | Facility: CLINIC | Age: 72
End: 2017-09-21

## 2017-09-21 NOTE — TELEPHONE ENCOUNTER
Patient has 2 questions:    Would his sex life change due to AICD?    Would you consider patient deferring AICD implant for another 6 months?  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  Patient is aware Dr Jo is out of the office until Monday.  Please advise.

## 2017-09-25 NOTE — TELEPHONE ENCOUNTER
Spoke with patient, he is uncomfortable with proceeding with AICD placement and has a lot of questions.  Offered patient an appointment with louie Spence.  To reiterate recommendations and answer patient's questions.

## 2017-09-26 ENCOUNTER — OFFICE VISIT (OUTPATIENT)
Dept: CARDIOLOGY | Facility: CLINIC | Age: 72
End: 2017-09-26
Payer: MEDICARE

## 2017-09-26 VITALS
BODY MASS INDEX: 32.87 KG/M2 | DIASTOLIC BLOOD PRESSURE: 90 MMHG | HEIGHT: 73 IN | WEIGHT: 248 LBS | SYSTOLIC BLOOD PRESSURE: 140 MMHG | OXYGEN SATURATION: 96 % | HEART RATE: 61 BPM

## 2017-09-26 DIAGNOSIS — I25.5 ISCHEMIC CARDIOMYOPATHY: Primary | ICD-10-CM

## 2017-09-26 PROCEDURE — 99999 PR PBB SHADOW E&M-EST. PATIENT-LVL III: CPT | Mod: PBBFAC,,, | Performed by: INTERNAL MEDICINE

## 2017-09-26 PROCEDURE — 1159F MED LIST DOCD IN RCRD: CPT | Mod: ,,, | Performed by: INTERNAL MEDICINE

## 2017-09-26 PROCEDURE — 3080F DIAST BP >= 90 MM HG: CPT | Mod: ,,, | Performed by: INTERNAL MEDICINE

## 2017-09-26 PROCEDURE — 99213 OFFICE O/P EST LOW 20 MIN: CPT | Mod: PBBFAC,PO | Performed by: INTERNAL MEDICINE

## 2017-09-26 PROCEDURE — 99213 OFFICE O/P EST LOW 20 MIN: CPT | Mod: S$PBB,,, | Performed by: INTERNAL MEDICINE

## 2017-09-26 PROCEDURE — 1126F AMNT PAIN NOTED NONE PRSNT: CPT | Mod: ,,, | Performed by: INTERNAL MEDICINE

## 2017-09-26 PROCEDURE — 3077F SYST BP >= 140 MM HG: CPT | Mod: ,,, | Performed by: INTERNAL MEDICINE

## 2017-09-26 NOTE — PROGRESS NOTES
Chief Complaint:  Nimesh Saini is a 71 y.o. male who presents for evaluation of No chief complaint on file.      HPI:   Mr. Saini is a 71 year-old male who was referred by Dr. Avendano for evaluation for ICD.  He was [facility] to [facility] in May 2017 with NSTEMI and Class III CHF.  Discovered to have 3V CAD with LAD , distal LCX disease and high grade RCA.  He saw Dr. Augustin at [facility], declined CABG due to diffusely diseased LAD, presumably poor LIMA target.  In early 2017 he underwent PCI of the RCA/RPDA/RPL with JAKUB x 4 and PTOA-CSI.  His EF remained reduced < 30% at that time.  It does not appear that his LAD was revascularized - may be that it is nonintervenable.  Patient reports improvement in angina and CHF symptoms since RCA PCI, though he remains with some dyspnea as a solid Fc II.  Reports compliance with therapy. Currently has a lifevest in place since about May - no events nor prior history of syncope or SCA.  An ECG from 2017 showed anterior infarct with NSIVCD with QRS of 144 msec.      His initial visit was Aug 30, 2017.  Here for f/u to discuss ICD.  Since last visit, records received and in discussion with his referring cardiologist Dr. Avendano, it does not appear further revascularization of LAD is neither feasible nor beneficial.      He has questions about the ICD    Patient Active Problem List    Diagnosis Date Noted    Lung nodule 2017    Chest pain, atypical 2017    Pleural effusion 2017    History of asbestos exposure 2017    Shortness of breath 2017    Ischemic cardiomyopathy 2017    CAD, multiple vessel 2017    Acute combined systolic and diastolic CHF, NYHA class 3 2017    Hiatal hernia with GERD 2017    Epigastric abdominal pain 2017    Erectile dysfunction 2014    Obesity (BMI 30.0-34.9) 2014    Hypertension 2014       Right Arm BP - Sittin/84  Left Arm BP - Sittin/90    Current  Outpatient Prescriptions   Medication Sig    albuterol (PROAIR HFA) 90 mcg/actuation inhaler Inhale 2 puffs into the lungs every 6 (six) hours as needed for Wheezing. Rescue    aspirin (ECOTRIN) 81 MG EC tablet Take 81 mg by mouth once daily.    atorvastatin (LIPITOR) 80 MG tablet Take 80 mg by mouth once daily.    clopidogrel (PLAVIX) 75 mg tablet Take 75 mg by mouth once daily.    furosemide (LASIX) 40 MG tablet Take 1 tablet (40 mg total) by mouth 2 (two) times daily.    lisinopril (PRINIVIL,ZESTRIL) 40 MG tablet Take 40 mg by mouth once daily at 6am.    metoprolol succinate (TOPROL-XL) 25 MG 24 hr tablet Take 12.5 mg by mouth once daily.    nitroGLYCERIN (NITROSTAT) 0.4 MG SL tablet DIS 1 T UNT Q FIVE MINUTES UP TO THREE DOSES PRF CHEST PAIN    pantoprazole (PROTONIX) 40 MG tablet Take 1 tablet (40 mg total) by mouth once daily.     No current facility-administered medications for this visit.        Review of Systems   Constitution: Negative for diaphoresis, weakness, malaise/fatigue, weight gain and weight loss.   HENT: Negative for nosebleeds.    Cardiovascular: Positive for dyspnea on exertion. Negative for chest pain, claudication, cyanosis, irregular heartbeat, leg swelling, near-syncope, orthopnea, palpitations, paroxysmal nocturnal dyspnea and syncope.   Respiratory: Positive for shortness of breath. Negative for cough, hemoptysis, sleep disturbances due to breathing, snoring, sputum production and wheezing.    Hematologic/Lymphatic: Negative for bleeding problem. Does not bruise/bleed easily.   Skin: Negative for poor wound healing and rash.   Musculoskeletal: Negative for myalgias.   Gastrointestinal: Negative for abdominal pain, heartburn, hematemesis, hematochezia, hemorrhoids and melena.   Neurological: Negative for dizziness, focal weakness, light-headedness and loss of balance.   Psychiatric/Behavioral: Negative for altered mental status, depression and memory loss.         Objective:      Physical Exam   Constitutional: He is oriented to person, place, and time. He appears well-developed and well-nourished. No distress. He is not intubated.   HENT:   Head: Atraumatic.   Neck: No JVD present.   Cardiovascular: Normal rate, regular rhythm, S1 normal, S2 normal, normal heart sounds and intact distal pulses.  PMI is not displaced.  Exam reveals no gallop, no S3, no S4, no distant heart sounds, no friction rub, no midsystolic click and no opening snap.    No murmur heard.  Pulses:       Carotid pulses are 2+ on the right side, and 2+ on the left side.       Radial pulses are 2+ on the right side, and 2+ on the left side.   Pulmonary/Chest: Effort normal and breath sounds normal. No accessory muscle usage. No apnea, no tachypnea and no bradypnea. He is not intubated. No respiratory distress. He has no decreased breath sounds. He has no wheezes. He has no rhonchi. He has no rales. He exhibits no tenderness.   Abdominal: Soft. Normal aorta and bowel sounds are normal. He exhibits no distension, no abdominal bruit, no pulsatile midline mass and no mass. There is no tenderness.   Musculoskeletal: He exhibits no edema.   Neurological: He is alert and oriented to person, place, and time.   Skin: Skin is warm. No rash noted. No erythema. No pallor.   Psychiatric: He has a normal mood and affect. His behavior is normal. Judgment and thought content normal.   Vitals reviewed.      LABS REVIEWED    ECHOCARDIOGRAM:  See media    ECG:    STRESS TESTING:    CARDIAC CATHETERIZATION: see media     Assessment:     1. Ischemic cardiomyopathy      In summary, Mr. Saini has ischemic cardiomyopathy with EF < 30% and is now 90 days post revascularization.  He has been on GDMT for ACC/AHA stage C/NYHA class III CHF to include Carvedilol and lisinopril.        ICD therapy is indicated in patients with LVEF less than or equal to  35% due to prior MI who are at least 40 days post-MI and are in  NYHA functional Class II or  III. (Level of Evidence: A) (16,333)    Today his QRS is 118 msec on ECG so it appears CRT is not indicated.  He does, however, have bradycardia in the past that limited Coreg dosing, so a dual chamber ICD would be beneficial.      Mayra et al. [facility] Vol. 51, No. 21, 2008  ACC/AHA/HRS Guidelines for Device-Based Therapy May 27, 2008:e1-62    [facility] Vol. 60, No. 14, 2012 Ashley et al. 1303  October 2, 2012:[id number] 2012 Device-Based Therapy Guideline Focused Update  Plan:   -Proceed with dual chamber Medtronic ICD on 9/28/2017.      Continue with current medical plan and lifestyle changes.    I have reviewed the patient's medical history in detail and updated the computerized patient record.    No orders of the defined types were placed in this encounter.      Follow up as scheduled. Return sooner for concerns or questions      He expressed verbal understanding and agreed with the plan          Mahamed Jo MD, FACC, CCDS  Interventional Cardiology  [facility]

## 2017-09-28 ENCOUNTER — SURGERY (OUTPATIENT)
Age: 72
End: 2017-09-28

## 2017-09-28 ENCOUNTER — HOSPITAL ENCOUNTER (OUTPATIENT)
Facility: HOSPITAL | Age: 72
Discharge: HOME | End: 2017-09-29
Attending: INTERNAL MEDICINE | Admitting: INTERNAL MEDICINE
Payer: MEDICARE

## 2017-09-28 DIAGNOSIS — Z01.818 PRE-OP EXAM: ICD-10-CM

## 2017-09-28 DIAGNOSIS — I49.9 ARRHYTHMIA: ICD-10-CM

## 2017-09-28 DIAGNOSIS — I25.10 ATHEROSCLEROTIC HEART DISEASE OF NATIVE CORONARY ARTERY WITHOUT ANGINA PECTORIS: ICD-10-CM

## 2017-09-28 DIAGNOSIS — I25.5 ISCHEMIC CARDIOMYOPATHY: ICD-10-CM

## 2017-09-28 LAB
ANION GAP SERPL CALC-SCNC: 10 MMOL/L
APTT BLDCRRT: 31.1 SEC
BASOPHILS # BLD AUTO: 0.04 K/UL
BASOPHILS NFR BLD: 0.6 %
BUN SERPL-MCNC: 17 MG/DL
CALCIUM SERPL-MCNC: 9.7 MG/DL
CHLORIDE SERPL-SCNC: 106 MMOL/L
CO2 SERPL-SCNC: 28 MMOL/L
CREAT SERPL-MCNC: 1.1 MG/DL
DIFFERENTIAL METHOD: ABNORMAL
EOSINOPHIL # BLD AUTO: 0.4 K/UL
EOSINOPHIL NFR BLD: 6.4 %
ERYTHROCYTE [DISTWIDTH] IN BLOOD BY AUTOMATED COUNT: 13.4 %
EST. GFR  (AFRICAN AMERICAN): >60 ML/MIN/1.73 M^2
EST. GFR  (NON AFRICAN AMERICAN): >60 ML/MIN/1.73 M^2
GLUCOSE SERPL-MCNC: 102 MG/DL
HCT VFR BLD AUTO: 42.5 %
HGB BLD-MCNC: 14.6 G/DL
INR PPP: 1
LYMPHOCYTES # BLD AUTO: 1.1 K/UL
LYMPHOCYTES NFR BLD: 17.3 %
MCH RBC QN AUTO: 30.9 PG
MCHC RBC AUTO-ENTMCNC: 34.4 G/DL
MCV RBC AUTO: 90 FL
MONOCYTES # BLD AUTO: 0.6 K/UL
MONOCYTES NFR BLD: 9.3 %
NEUTROPHILS # BLD AUTO: 4.1 K/UL
NEUTROPHILS NFR BLD: 66.2 %
PLATELET # BLD AUTO: 143 K/UL
PMV BLD AUTO: 10.3 FL
POTASSIUM SERPL-SCNC: 3.7 MMOL/L
PROTHROMBIN TIME: 11 SEC
RBC # BLD AUTO: 4.73 M/UL
SODIUM SERPL-SCNC: 144 MMOL/L
WBC # BLD AUTO: 6.23 K/UL

## 2017-09-28 PROCEDURE — 93005 ELECTROCARDIOGRAM TRACING: CPT

## 2017-09-28 PROCEDURE — 25000003 PHARM REV CODE 250

## 2017-09-28 PROCEDURE — 85730 THROMBOPLASTIN TIME PARTIAL: CPT

## 2017-09-28 PROCEDURE — 80048 BASIC METABOLIC PNL TOTAL CA: CPT

## 2017-09-28 PROCEDURE — 99152 MOD SED SAME PHYS/QHP 5/>YRS: CPT | Mod: ,,, | Performed by: INTERNAL MEDICINE

## 2017-09-28 PROCEDURE — 63600175 PHARM REV CODE 636 W HCPCS: Performed by: INTERNAL MEDICINE

## 2017-09-28 PROCEDURE — 85610 PROTHROMBIN TIME: CPT

## 2017-09-28 PROCEDURE — 85025 COMPLETE CBC W/AUTO DIFF WBC: CPT

## 2017-09-28 PROCEDURE — 63600175 PHARM REV CODE 636 W HCPCS

## 2017-09-28 PROCEDURE — 93010 ELECTROCARDIOGRAM REPORT: CPT | Mod: 76,,, | Performed by: INTERNAL MEDICINE

## 2017-09-28 PROCEDURE — 99152 MOD SED SAME PHYS/QHP 5/>YRS: CPT

## 2017-09-28 PROCEDURE — 33249 INSJ/RPLCMT DEFIB W/LEAD(S): CPT | Mod: Q0,,, | Performed by: INTERNAL MEDICINE

## 2017-09-28 RX ORDER — ASPIRIN 81 MG/1
81 TABLET ORAL DAILY
Status: DISCONTINUED | OUTPATIENT
Start: 2017-09-29 | End: 2017-09-29 | Stop reason: HOSPADM

## 2017-09-28 RX ORDER — CEFAZOLIN SODIUM 2 G/50ML
2 SOLUTION INTRAVENOUS
Status: DISCONTINUED | OUTPATIENT
Start: 2017-09-28 | End: 2017-09-28 | Stop reason: HOSPADM

## 2017-09-28 RX ORDER — ATORVASTATIN CALCIUM 40 MG/1
80 TABLET, FILM COATED ORAL DAILY
Status: DISCONTINUED | OUTPATIENT
Start: 2017-09-29 | End: 2017-09-29 | Stop reason: HOSPADM

## 2017-09-28 RX ORDER — CLOPIDOGREL BISULFATE 75 MG/1
75 TABLET ORAL DAILY
Status: DISCONTINUED | OUTPATIENT
Start: 2017-09-29 | End: 2017-09-29 | Stop reason: HOSPADM

## 2017-09-28 RX ORDER — ALBUTEROL SULFATE 90 UG/1
2 AEROSOL, METERED RESPIRATORY (INHALATION) EVERY 6 HOURS PRN
Status: DISCONTINUED | OUTPATIENT
Start: 2017-09-28 | End: 2017-09-29 | Stop reason: HOSPADM

## 2017-09-28 RX ORDER — PANTOPRAZOLE SODIUM 40 MG/1
40 TABLET, DELAYED RELEASE ORAL DAILY
Status: DISCONTINUED | OUTPATIENT
Start: 2017-09-29 | End: 2017-09-29 | Stop reason: HOSPADM

## 2017-09-28 RX ORDER — FUROSEMIDE 40 MG/1
40 TABLET ORAL 2 TIMES DAILY
Status: DISCONTINUED | OUTPATIENT
Start: 2017-09-28 | End: 2017-09-29 | Stop reason: HOSPADM

## 2017-09-28 RX ORDER — LISINOPRIL 20 MG/1
40 TABLET ORAL DAILY
Status: DISCONTINUED | OUTPATIENT
Start: 2017-09-29 | End: 2017-09-29 | Stop reason: HOSPADM

## 2017-09-28 RX ORDER — CEFAZOLIN SODIUM 1 G/50ML
1 SOLUTION INTRAVENOUS
Status: DISCONTINUED | OUTPATIENT
Start: 2017-09-28 | End: 2017-09-29 | Stop reason: HOSPADM

## 2017-09-28 RX ADMIN — CEFAZOLIN SODIUM 1 G: 1 SOLUTION INTRAVENOUS at 05:09

## 2017-09-28 NOTE — H&P (VIEW-ONLY)
Chief Complaint:  Nimesh Saini is a 71 y.o. male who presents for evaluation of No chief complaint on file.      HPI:   Mr. Saini is a 71 year-old male who was referred by Dr. Avendano for evaluation for ICD.  He was admitted to [facility] in May 2017 with NSTEMI and Class III CHF.  Discovered to have 3V CAD with LAD , distal LCX disease and high grade RCA.  He saw Dr. Augustin at [facility], declined CABG due to diffusely diseased LAD, presumably poor LIMA target.  In early 2017 he underwent PCI of the RCA/RPDA/RPL with JAKUB x 4 and PTOA-CSI.  His EF remained reduced < 30% at that time.  It does not appear that his LAD was revascularized - may be that it is nonintervenable.  Patient reports improvement in angina and CHF symptoms since RCA PCI, though he remains with some dyspnea as a solid Fc II.  Reports compliance with therapy. Currently has a lifevest in place since about May - no events nor prior history of syncope or SCA.  An ECG from 2017 showed anterior infarct with NSIVCD with QRS of 144 msec.      His initial visit was Aug 30, 2017.  Here for f/u to discuss ICD.  Since last visit, records received and in discussion with his referring cardiologist Dr. Avendano, it does not appear further revascularization of LAD is neither feasible nor beneficial.      He has questions about the ICD    Patient Active Problem List    Diagnosis Date Noted    Lung nodule 2017    Chest pain, atypical 2017    Pleural effusion 2017    History of asbestos exposure 2017    Shortness of breath 2017    Ischemic cardiomyopathy 2017    CAD, multiple vessel 2017    Acute combined systolic and diastolic CHF, NYHA class 3 2017    Hiatal hernia with GERD 2017    Epigastric abdominal pain 2017    Erectile dysfunction 2014    Obesity (BMI 30.0-34.9) 2014    Hypertension 2014       Right Arm BP - Sittin/84  Left Arm BP - Sittin/90    Current  Outpatient Prescriptions   Medication Sig    albuterol (PROAIR HFA) 90 mcg/actuation inhaler Inhale 2 puffs into the lungs every 6 (six) hours as needed for Wheezing. Rescue    aspirin (ECOTRIN) 81 MG EC tablet Take 81 mg by mouth once daily.    atorvastatin (LIPITOR) 80 MG tablet Take 80 mg by mouth once daily.    clopidogrel (PLAVIX) 75 mg tablet Take 75 mg by mouth once daily.    furosemide (LASIX) 40 MG tablet Take 1 tablet (40 mg total) by mouth 2 (two) times daily.    lisinopril (PRINIVIL,ZESTRIL) 40 MG tablet Take 40 mg by mouth once daily at 6am.    metoprolol succinate (TOPROL-XL) 25 MG 24 hr tablet Take 12.5 mg by mouth once daily.    nitroGLYCERIN (NITROSTAT) 0.4 MG SL tablet DIS 1 T UNT Q FIVE MINUTES UP TO THREE DOSES PRF CHEST PAIN    pantoprazole (PROTONIX) 40 MG tablet Take 1 tablet (40 mg total) by mouth once daily.     No current facility-administered medications for this visit.        Review of Systems   Constitution: Negative for diaphoresis, weakness, malaise/fatigue, weight gain and weight loss.   HENT: Negative for nosebleeds.    Cardiovascular: Positive for dyspnea on exertion. Negative for chest pain, claudication, cyanosis, irregular heartbeat, leg swelling, near-syncope, orthopnea, palpitations, paroxysmal nocturnal dyspnea and syncope.   Respiratory: Positive for shortness of breath. Negative for cough, hemoptysis, sleep disturbances due to breathing, snoring, sputum production and wheezing.    Hematologic/Lymphatic: Negative for bleeding problem. Does not bruise/bleed easily.   Skin: Negative for poor wound healing and rash.   Musculoskeletal: Negative for myalgias.   Gastrointestinal: Negative for abdominal pain, heartburn, hematemesis, hematochezia, hemorrhoids and melena.   Neurological: Negative for dizziness, focal weakness, light-headedness and loss of balance.   Psychiatric/Behavioral: Negative for altered mental status, depression and memory loss.         Objective:      Physical Exam   Constitutional: He is oriented to person, place, and time. He appears well-developed and well-nourished. No distress. He is not intubated.   HENT:   Head: Atraumatic.   Neck: No JVD present.   Cardiovascular: Normal rate, regular rhythm, S1 normal, S2 normal, normal heart sounds and intact distal pulses.  PMI is not displaced.  Exam reveals no gallop, no S3, no S4, no distant heart sounds, no friction rub, no midsystolic click and no opening snap.    No murmur heard.  Pulses:       Carotid pulses are 2+ on the right side, and 2+ on the left side.       Radial pulses are 2+ on the right side, and 2+ on the left side.   Pulmonary/Chest: Effort normal and breath sounds normal. No accessory muscle usage. No apnea, no tachypnea and no bradypnea. He is not intubated. No respiratory distress. He has no decreased breath sounds. He has no wheezes. He has no rhonchi. He has no rales. He exhibits no tenderness.   Abdominal: Soft. Normal aorta and bowel sounds are normal. He exhibits no distension, no abdominal bruit, no pulsatile midline mass and no mass. There is no tenderness.   Musculoskeletal: He exhibits no edema.   Neurological: He is alert and oriented to person, place, and time.   Skin: Skin is warm. No rash noted. No erythema. No pallor.   Psychiatric: He has a normal mood and affect. His behavior is normal. Judgment and thought content normal.   Vitals reviewed.      LABS REVIEWED    ECHOCARDIOGRAM:  See media    ECG:    STRESS TESTING:    CARDIAC CATHETERIZATION: see media     Assessment:     1. Ischemic cardiomyopathy      In summary, Mr. Saini has ischemic cardiomyopathy with EF < 30% and is now 90 days post revascularization.  He has been on GDMT for ACC/AHA stage C/NYHA class III CHF to include Carvedilol and lisinopril.        ICD therapy is indicated in patients with LVEF less than or equal to  35% due to prior MI who are at least 40 days post-MI and are in  NYHA functional Class II or  III. (Level of Evidence: A) (16,333)    Today his QRS is 118 msec on ECG so it appears CRT is not indicated.  He does, however, have bradycardia in the past that limited Coreg dosing, so a dual chamber ICD would be beneficial.      Mayra et al. [facility] Vol. 51, No. 21, 2008  ACC/AHA/HRS Guidelines for Device-Based Therapy May 27, 2008:e1-62    [facility] Vol. 60, No. 14, 2012 Ashley et al. 1303  October 2, 2012:[id number] 2012 Device-Based Therapy Guideline Focused Update  Plan:   -Proceed with dual chamber Medtronic ICD on 9/28/2017.      Continue with current medical plan and lifestyle changes.    I have reviewed the patient's medical history in detail and updated the computerized patient record.    No orders of the defined types were placed in this encounter.      Follow up as scheduled. Return sooner for concerns or questions      He expressed verbal understanding and agreed with the plan          Mahamed Jo MD, FACC, CCDS  Interventional Cardiology  [facility]

## 2017-09-28 NOTE — PLAN OF CARE
Pt laying in bed with no complaints. Monitors applied, VSS. Yellow socks on and fall risk reviewed with pt and verbalizes understanding. Procedure and recovery explained to pt and all questions answered. Will continue to monitor.

## 2017-09-28 NOTE — DISCHARGE INSTRUCTIONS
*DO NOT lift your arm above shoulder height. Use sling day and night for the next 3 days. Even when you are asleep.  *After the initial 3 days, use your sling while awake for the next 2 weeks.  *Let your dressing fall on its own, DO NOT pull on dressing as surgical glue can be accidentally removed.  *Check your incision every day for the next week for signs and symptoms of infections (yellow/green discharge, redness, tenderness and pain).

## 2017-09-28 NOTE — PROGRESS NOTES
Pt in cath lab recovery, aaox4, vss, denies pain or any other discomfort. Moderate sedation and fall precautions reviewed w pt at this time. Left upper chest inc cdi, w dermaflex in place. Aquacel drs applied 40min post procedure, CXR and EKG done, rep at bedside, pt given temporary device card. Site care education done at this time w pt and Sling applied. Pt verbalized full understanding and denies questions

## 2017-09-28 NOTE — PLAN OF CARE
Did assessment on patient. Went over plan of care. Bed in low position, call light in reach.Saftey measure taken. Went over fall risk policy with the patient, refused bed alarm, patient's gait is steady, verbalized understanding

## 2017-09-28 NOTE — PROGRESS NOTES
Cath recovery completed, awaiting room assignment from House supervisor. Pt aaox4, denies pain or any other discomfort. On Cont telemetry, will cont to monitor.

## 2017-09-28 NOTE — INTERVAL H&P NOTE
The patient has been examined and the H&P has been reviewed:    I concur with the findings and no changes have occurred since H&P was written.    Anesthesia/Surgery risks, benefits and alternative options discussed and understood by patient/family.          There are no hospital problems to display for this patient.        Mahamed Jo MD, FACC, CCDS  Interventional Cardiology  [facility]

## 2017-09-29 VITALS
WEIGHT: 242.94 LBS | TEMPERATURE: 98 F | DIASTOLIC BLOOD PRESSURE: 98 MMHG | RESPIRATION RATE: 20 BRPM | HEIGHT: 73 IN | SYSTOLIC BLOOD PRESSURE: 167 MMHG | HEART RATE: 59 BPM | OXYGEN SATURATION: 97 % | BODY MASS INDEX: 32.2 KG/M2

## 2017-09-29 PROBLEM — I50.40 COMBINED SYSTOLIC AND DIASTOLIC CONGESTIVE HEART FAILURE, NYHA CLASS 3: Chronic | Status: ACTIVE | Noted: 2017-05-02

## 2017-09-29 PROCEDURE — 90662 IIV NO PRSV INCREASED AG IM: CPT | Performed by: INTERNAL MEDICINE

## 2017-09-29 PROCEDURE — 90471 IMMUNIZATION ADMIN: CPT | Performed by: INTERNAL MEDICINE

## 2017-09-29 PROCEDURE — 25000003 PHARM REV CODE 250: Performed by: INTERNAL MEDICINE

## 2017-09-29 PROCEDURE — 63600175 PHARM REV CODE 636 W HCPCS: Performed by: INTERNAL MEDICINE

## 2017-09-29 PROCEDURE — G0008 ADMIN INFLUENZA VIRUS VAC: HCPCS | Performed by: INTERNAL MEDICINE

## 2017-09-29 RX ADMIN — ASPIRIN 81 MG: 81 TABLET, COATED ORAL at 08:09

## 2017-09-29 RX ADMIN — CLOPIDOGREL BISULFATE 75 MG: 75 TABLET ORAL at 08:09

## 2017-09-29 RX ADMIN — CEFAZOLIN SODIUM 1 G: 1 SOLUTION INTRAVENOUS at 01:09

## 2017-09-29 RX ADMIN — ATORVASTATIN CALCIUM 80 MG: 40 TABLET, FILM COATED ORAL at 08:09

## 2017-09-29 RX ADMIN — METOPROLOL SUCCINATE 12.5 MG: 25 TABLET, EXTENDED RELEASE ORAL at 08:09

## 2017-09-29 RX ADMIN — LISINOPRIL 40 MG: 20 TABLET ORAL at 08:09

## 2017-09-29 RX ADMIN — INFLUENZA A VIRUSA/MICHIGAN/45/2015 X-275 (H1N1) ANTIGEN (FORMALDEHYDE INACTIVATED), INFLUENZA A VIRUS A/HONG KONG/4801/2014 X-263B (H3N2) ANTIGEN (FORMALDEHYDE INACTIVATED), AND INFLUENZA B VIRUS B/BRISBANE/60/2008 ANTIGEN (FORMALDEHYDE INACTIVATED) 0.5 ML: 60; 60; 60 INJECTION, SUSPENSION INTRAMUSCULAR at 10:09

## 2017-09-29 RX ADMIN — PANTOPRAZOLE SODIUM 40 MG: 40 TABLET, DELAYED RELEASE ORAL at 08:09

## 2017-09-29 RX ADMIN — CEFAZOLIN SODIUM 1 G: 1 SOLUTION INTRAVENOUS at 08:09

## 2017-09-29 NOTE — PLAN OF CARE
Problem: Patient Care Overview  Goal: Plan of Care Review  Outcome: Ongoing (interventions implemented as appropriate)  Plan of care discussed with patient, patient verbalized agreement. Fall precautions were maintained this shift. Patients incision for pace insertion remained unchanged and iced by patient request. Patients BP remains elevated through the shift. Patient remains on telemetry running sinus rhythm. Will continue to monitor.

## 2017-09-29 NOTE — DISCHARGE SUMMARY
[facility]  Cardiology  Discharge Summary      Patient Name: Nimesh Saini  MRN: [id number]  Admission Date: 9/28/2017  Hospital Length of Stay: 0 days  Discharge Date and Time:  09/29/2017 9:39 AM  Attending Physician: Mahamed Jo MD  Discharging Provider: Mahamed Jo MD  Primary Care Physician: Stephane Fuentes MD    HPI: 71 year-old male with ischemic cadiomyopathy EF < 30% referred for ICD implant for primary prevention of SCA.    Procedure(s) (LRB):  INSERTION-ICD-BIVENTRICULAR (Left)     Indwelling Lines/Drains at time of discharge:  Lines/Drains/Airways          No matching active lines, drains, or airways          Hospital Course (synopsis of major diagnoses, care, treatment, and services provided during the course of the hospital stay): Medtronic single chamber ICD implanted 9/28/17.  Patient recovered uneventfully discharged home 9/29/2017.    Consults: None    Significant Diagnostic Studies: Labs:   BMP:   Recent Labs  Lab 09/28/17  0747         K 3.7      CO2 28   BUN 17   CREATININE 1.1   CALCIUM 9.7   , CMP   Recent Labs  Lab 09/28/17  0747      K 3.7      CO2 28      BUN 17   CREATININE 1.1   CALCIUM 9.7   ANIONGAP 10   ESTGFRAFRICA >60   EGFRNONAA >60   , CBC   Recent Labs  Lab 09/28/17  0747   WBC 6.23   HGB 14.6   HCT 42.5   *    and INR   Lab Results   Component Value Date    INR 1.0 09/28/2017    INR 1.1 06/05/2017    INR 1.3 (H) 04/27/2017       Pending Diagnostic Studies:     None          Final Active Diagnoses:    Diagnosis Date Noted POA    PRINCIPAL PROBLEM:  Ischemic cardiomyopathy [I25.5] 05/02/2017 Yes    Combined systolic and diastolic congestive heart failure, NYHA class 3 [I50.40] 05/02/2017 Yes     Chronic    CAD, multiple vessel [I25.10] 05/02/2017 Yes    Hypertension [I10] 08/14/2014 Yes     Chronic      Problems Resolved During this Admission:    Diagnosis Date Noted Date Resolved POA        Discharged Condition: good    Follow Up:  Follow-up Information     Mahamed Jo MD. Go on 10/4/2017.    Specialties:  Cardiology, INTERVENTIONAL CARDIOLOGY  Why:  @ 3:20pm  Contact information:  [address] [address]  [phone]                 Patient Instructions:   No discharge procedures on file.  Medications:  Reconciled Home Medications:   Current Discharge Medication List      CONTINUE these medications which have NOT CHANGED    Details   aspirin (ECOTRIN) 81 MG EC tablet Take 81 mg by mouth once daily.      atorvastatin (LIPITOR) 80 MG tablet Take 80 mg by mouth once daily.      clopidogrel (PLAVIX) 75 mg tablet Take 75 mg by mouth once daily.      furosemide (LASIX) 40 MG tablet Take 1 tablet (40 mg total) by mouth 2 (two) times daily.  Qty: 60 tablet, Refills: 11      lisinopril (PRINIVIL,ZESTRIL) 40 MG tablet Take 40 mg by mouth once daily at 6am.  Refills: 6      metoprolol succinate (TOPROL-XL) 25 MG 24 hr tablet Take 12.5 mg by mouth once daily.      pantoprazole (PROTONIX) 40 MG tablet Take 1 tablet (40 mg total) by mouth once daily.  Qty: 30 tablet, Refills: 11      albuterol (PROAIR HFA) 90 mcg/actuation inhaler Inhale 2 puffs into the lungs every 6 (six) hours as needed for Wheezing. Rescue      nitroGLYCERIN (NITROSTAT) 0.4 MG SL tablet DIS 1 T UNT Q FIVE MINUTES UP TO THREE DOSES PRF CHEST PAIN  Refills: 3             Time spent on the discharge of patient: 15 minutes    Mahamed Jo MD  Cardiology  [facility]

## 2017-09-29 NOTE — PLAN OF CARE
Went over discharge instructions, verbalized understanding. Removed IV , tip intact. Removed tele, no ectopy on tele.Patient waiting on ride home.

## 2017-09-29 NOTE — PLAN OF CARE
Problem: Patient Care Overview  Goal: Plan of Care Review  Outcome: Outcome(s) achieved Date Met: 09/29/17 09/29/17 9139   Coping/Psychosocial   Plan Of Care Reviewed With patient   Patient on tele, no ectopy on tele. Sinus rose 56. Patient had a AICD placed yesterday without complications, ice pack place to reduce swelling, no complaint of pain.Patient received IV antibiotics, as well as morning medication, patient refused lasix. Patient is AAO and independent, fall prevention discussed with the patient, verbalized understanding, refused bed alarm.

## 2017-09-29 NOTE — PLAN OF CARE
Problem: Patient Care Overview  Goal: Plan of Care Review  Outcome: Ongoing (interventions implemented as appropriate)   09/28/17 1919   Coping/Psychosocial   Plan Of Care Reviewed With patient   Patient had an AICD placed today without complications. Patient on tele, no ectopy on tele noted.Patient has not complain of pain. Ice pack placed on site to reduce swelling and arm in a sling to keep immobile. Fall risk contract reviewed with patient, verbalized understanding. Non skid socks on and fall wrist band. Patient refused bed alarm. Patient has a steady gait. Patient receiving IV antibiotics every 8 hours. Report giving to PM nurse to resume care.

## 2017-10-04 ENCOUNTER — OFFICE VISIT (OUTPATIENT)
Dept: CARDIOLOGY | Facility: CLINIC | Age: 72
End: 2017-10-04
Payer: MEDICARE

## 2017-10-04 VITALS
OXYGEN SATURATION: 96 % | HEIGHT: 73 IN | WEIGHT: 244 LBS | DIASTOLIC BLOOD PRESSURE: 80 MMHG | SYSTOLIC BLOOD PRESSURE: 135 MMHG | BODY MASS INDEX: 32.34 KG/M2 | TEMPERATURE: 98 F

## 2017-10-04 DIAGNOSIS — I25.5 ISCHEMIC CARDIOMYOPATHY: Primary | ICD-10-CM

## 2017-10-04 PROCEDURE — 99213 OFFICE O/P EST LOW 20 MIN: CPT | Mod: PBBFAC,PO | Performed by: INTERNAL MEDICINE

## 2017-10-04 PROCEDURE — 99999 PR PBB SHADOW E&M-EST. PATIENT-LVL III: CPT | Mod: PBBFAC,,, | Performed by: INTERNAL MEDICINE

## 2017-10-04 PROCEDURE — 99024 POSTOP FOLLOW-UP VISIT: CPT | Mod: ,,, | Performed by: INTERNAL MEDICINE

## 2017-10-04 NOTE — PROGRESS NOTES
Subjective:   Chief Complaint:  Nimesh Saini is a 72 y.o. male who presents for follow-up of Hospital Follow Up and Post-op Evaluation      HPI:   Mr. Saini returns for post op wound check after ICD implant 1 week ago.      He has no complaints.      Patient Active Problem List    Diagnosis Date Noted    Lung nodule 2017    Chest pain, atypical 2017    Pleural effusion 2017    History of asbestos exposure 2017    Shortness of breath 2017    Ischemic cardiomyopathy 2017    CAD, multiple vessel 2017    Combined systolic and diastolic congestive heart failure, NYHA class 3 2017    Hiatal hernia with GERD 2017    Epigastric abdominal pain 2017    Erectile dysfunction 2014    Obesity (BMI 30.0-34.9) 2014    Hypertension 2014           Right Arm BP - Sittin/80  Left Arm BP - Sittin/88    Current Outpatient Prescriptions   Medication Sig    albuterol (PROAIR HFA) 90 mcg/actuation inhaler Inhale 2 puffs into the lungs every 6 (six) hours as needed for Wheezing. Rescue    aspirin (ECOTRIN) 81 MG EC tablet Take 81 mg by mouth once daily.    atorvastatin (LIPITOR) 80 MG tablet Take 80 mg by mouth once daily.    clopidogrel (PLAVIX) 75 mg tablet Take 75 mg by mouth once daily.    furosemide (LASIX) 40 MG tablet Take 1 tablet (40 mg total) by mouth 2 (two) times daily.    lisinopril (PRINIVIL,ZESTRIL) 40 MG tablet Take 40 mg by mouth once daily at 6am.    metoprolol succinate (TOPROL-XL) 25 MG 24 hr tablet Take 12.5 mg by mouth once daily.    nitroGLYCERIN (NITROSTAT) 0.4 MG SL tablet DIS 1 T UNT Q FIVE MINUTES UP TO THREE DOSES PRF CHEST PAIN    pantoprazole (PROTONIX) 40 MG tablet Take 1 tablet (40 mg total) by mouth once daily.     No current facility-administered medications for this visit.          Review of Systems   Constitution: Negative for chills, decreased appetite, diaphoresis, fever, weakness,  malaise/fatigue and night sweats.   Cardiovascular: Negative for chest pain and claudication.         Objective:   Physical Exam   Cardiovascular:   DEVICE WOUND CHECK EXAM:      Incision in left upper chest has normal appearance of healing 1 week postoperatively.  No oozing, drainage, or hematoma is noted.  Dermabond in place.  Pocket & incision without evidence of significant erythema, tenderness, warmth, or induration.     Dressing removed   Vitals reviewed.      LABS      CMP  Sodium   Date Value Ref Range Status   09/28/2017 144 136 - 145 mmol/L Final     Potassium   Date Value Ref Range Status   09/28/2017 3.7 3.5 - 5.1 mmol/L Final     Chloride   Date Value Ref Range Status   09/28/2017 106 95 - 110 mmol/L Final     CO2   Date Value Ref Range Status   09/28/2017 28 23 - 29 mmol/L Final     Glucose   Date Value Ref Range Status   09/28/2017 102 70 - 110 mg/dL Final     BUN, Bld   Date Value Ref Range Status   09/28/2017 17 8 - 23 mg/dL Final     Creatinine   Date Value Ref Range Status   09/28/2017 1.1 0.5 - 1.4 mg/dL Final     Calcium   Date Value Ref Range Status   09/28/2017 9.7 8.7 - 10.5 mg/dL Final     Anion Gap   Date Value Ref Range Status   09/28/2017 10 8 - 16 mmol/L Final     eGFR if    Date Value Ref Range Status   09/28/2017 >60 >60 mL/min/1.73 m^2 Final     eGFR if non    Date Value Ref Range Status   09/28/2017 >60 >60 mL/min/1.73 m^2 Final     Comment:     Calculation used to obtain the estimated glomerular filtration  rate (eGFR) is the CKD-EPI equation. Since race is unknown   in our information system, the eGFR values for   -American and Non--American patients are given   for each creatinine result.         Lab Results   Component Value Date    WBC 6.23 09/28/2017    HGB 14.6 09/28/2017    HCT 42.5 09/28/2017    MCV 90 09/28/2017     (L) 09/28/2017         Assessment:     1. Ischemic cardiomyopathy      Doing well, normal healing  Plan:      Enroll in [employer] device clinic/[facility] with Dr. Avendano.    Continue with current medical plan and lifestyle changes.    I have reviewed the patient's medical history in detail and updated the computerized patient record.      Follow up as scheduled. Return sooner for concerns or questions      He expressed verbal understanding and agreed with the plan          Mahamed Jo MD, FACC, CCDS  Interventional Cardiology  [facility]

## 2017-11-14 ENCOUNTER — TELEPHONE (OUTPATIENT)
Dept: CARDIOLOGY | Facility: CLINIC | Age: 72
End: 2017-11-14

## 2017-11-14 DIAGNOSIS — R06.02 SOB (SHORTNESS OF BREATH): Primary | ICD-10-CM

## 2017-11-14 NOTE — TELEPHONE ENCOUNTER
Returned call, while speaking with patient, Dr Mayo office called.  Advised to take the call, I would call back in a few minutes.

## 2017-11-14 NOTE — TELEPHONE ENCOUNTER
----- Message from Miriam Ignacio sent at 11/14/2017 11:59 AM CST -----  Contact: [phone]/self  Patient requesting to speak with you regarding appt he his with Dr. Mayo. Please call and advise.

## 2017-11-14 NOTE — TELEPHONE ENCOUNTER
Spoke with patient, has an appointment with Dr Mayo due to shortness of breath and history of pleural effusion on Thursday.  =Asked patient to call us back with an update.  Voiced understanding.

## 2017-11-14 NOTE — TELEPHONE ENCOUNTER
----- Message from Leena Rayna sent at 11/14/2017 10:56 AM CST -----  Contact: self, [phone]  Patient states he has been experiencing shortness of breath for 2-3 weeks. States he can hear fluid in his lungs when laying down. Please advise.

## 2017-11-16 ENCOUNTER — HOSPITAL ENCOUNTER (OUTPATIENT)
Dept: RADIOLOGY | Facility: HOSPITAL | Age: 72
Discharge: HOME | End: 2017-11-16
Attending: INTERNAL MEDICINE
Payer: MEDICARE

## 2017-11-16 ENCOUNTER — OFFICE VISIT (OUTPATIENT)
Dept: PULMONOLOGY | Facility: CLINIC | Age: 72
End: 2017-11-16
Payer: MEDICARE

## 2017-11-16 ENCOUNTER — HOSPITAL ENCOUNTER (OUTPATIENT)
Dept: PULMONOLOGY | Facility: CLINIC | Age: 72
Discharge: HOME | End: 2017-11-16
Payer: MEDICARE

## 2017-11-16 VITALS
DIASTOLIC BLOOD PRESSURE: 80 MMHG | WEIGHT: 235 LBS | HEART RATE: 64 BPM | RESPIRATION RATE: 16 BRPM | SYSTOLIC BLOOD PRESSURE: 122 MMHG | OXYGEN SATURATION: 98 % | BODY MASS INDEX: 31.14 KG/M2 | HEIGHT: 73 IN

## 2017-11-16 DIAGNOSIS — R06.02 SHORTNESS OF BREATH: ICD-10-CM

## 2017-11-16 DIAGNOSIS — K21.9 HIATAL HERNIA WITH GERD: ICD-10-CM

## 2017-11-16 DIAGNOSIS — I25.10 CAD, MULTIPLE VESSEL: ICD-10-CM

## 2017-11-16 DIAGNOSIS — E66.9 OBESITY (BMI 30.0-34.9): Chronic | ICD-10-CM

## 2017-11-16 DIAGNOSIS — R06.02 SOB (SHORTNESS OF BREATH): ICD-10-CM

## 2017-11-16 DIAGNOSIS — I50.42 CHRONIC COMBINED SYSTOLIC AND DIASTOLIC CONGESTIVE HEART FAILURE, NYHA CLASS 3: Chronic | ICD-10-CM

## 2017-11-16 DIAGNOSIS — K44.9 HIATAL HERNIA WITH GERD: ICD-10-CM

## 2017-11-16 DIAGNOSIS — R91.1 LUNG NODULE: Primary | ICD-10-CM

## 2017-11-16 DIAGNOSIS — J94.8 PLEURAL SCARRING: ICD-10-CM

## 2017-11-16 LAB
POST FEV1 FVC: 0.82
POST FEV1: 2.1
POST FVC: 2.57
PRE FEV1 FVC: 79
PRE FEV1: 2.09
PRE FVC: 2.64
PREDICTED FEV1 FVC: 78
PREDICTED FEV1: 3.42
PREDICTED FVC: 4.31

## 2017-11-16 PROCEDURE — 99999 PR PBB SHADOW E&M-EST. PATIENT-LVL IV: CPT | Mod: PBBFAC,,, | Performed by: INTERNAL MEDICINE

## 2017-11-16 PROCEDURE — 94060 EVALUATION OF WHEEZING: CPT | Mod: PBBFAC | Performed by: INTERNAL MEDICINE

## 2017-11-16 PROCEDURE — 71020 XR CHEST PA AND LATERAL: CPT | Mod: 26,,, | Performed by: RADIOLOGY

## 2017-11-16 PROCEDURE — 99214 OFFICE O/P EST MOD 30 MIN: CPT | Mod: PBBFAC,25 | Performed by: INTERNAL MEDICINE

## 2017-11-16 PROCEDURE — 99214 OFFICE O/P EST MOD 30 MIN: CPT | Mod: 25,S$PBB,, | Performed by: INTERNAL MEDICINE

## 2017-11-16 PROCEDURE — 94060 EVALUATION OF WHEEZING: CPT | Mod: 26,S$PBB,, | Performed by: INTERNAL MEDICINE

## 2017-11-16 PROCEDURE — 71020 XR CHEST PA AND LATERAL: CPT | Mod: TC

## 2017-11-17 NOTE — PROGRESS NOTES
Subjective:       Patient ID: Nimesh Saini is a 72 y.o. male.    Chief Complaint: Shortness of Breath and Abnormal Chest X-ray    HPI HISTORY OF PRESENT ILLNESS:  Mr. Saini is a 72-year-old remote former smoker   who comes for an interval assessment of shortness of breath and chronic chest   x-ray abnormalities.  He feels that he is not doing well with regard to his   respiratory symptoms.  He is short of breath with modest exertion.  He   also has been having thoracoabdominal pain.  He reports an occasional cough, but   no associated sputum production.  He has not been aware of any wheezing.  His   recent pain is not pleuritic in character.  It most often is localized over the   epigastric area.  It does seem to be worse at times after meals.  He remains on   medical therapy for treatment of gastroesophageal reflux.  It is unclear whether   current medications are providing a consistent beneficial effect.    Mr. Saini is not currently using any medications for control of respiratory symptoms.  He   has been prescribed albuterol in the past, but has not made recent use of this.    His past evaluations here have shown evidence for a chronic right pleural   effusion versus pleural scarring.    DATA:  I reviewed the images from a chest x-ray done earlier today.  The cardiac   silhouette appears to be in the upper limit of normal without obvious acute   cardiovascular abnormalities.  There is a pacemaker present with the battery on   the left.  This has been placed within the last several months.    There continues to be pleural thickening at the right lung base, which appears   stable or mildly improved in comparison to the chest x-ray from June.    Elsewhere, the lungs appear fairly clear.      CB/HN  dd: 11/16/2017 20:06:13 (CST)  td: 11/17/2017 12:52:53 (CST)  Doc ID   #[id number]  Job ID #[id number]    CC:       Review of Systems   Constitutional: Positive for fatigue. Negative for fever.   HENT: Negative for  postnasal drip, sinus pressure, voice change and congestion.    Respiratory: Positive for shortness of breath and dyspnea on extertion. Negative for cough, sputum production and wheezing.    Cardiovascular: Positive for chest pain. Negative for leg swelling.   Genitourinary: Negative for difficulty urinating.   Musculoskeletal: Negative for arthralgias and back pain.   Skin: Negative for rash.   Gastrointestinal: Positive for abdominal pain. Negative for acid reflux.   Neurological: Negative for dizziness and weakness.   Hematological: Negative for adenopathy.   Psychiatric/Behavioral: The patient is nervous/anxious.        Objective:      Physical Exam   Constitutional: He is oriented to person, place, and time. He appears well-developed. He is obese.   HENT:   Head: Normocephalic.   Mouth/Throat: Oropharynx is clear and moist. No oropharyngeal exudate.   Neck: Normal range of motion. Neck supple. No JVD present. No tracheal deviation present. No thyromegaly present.   Cardiovascular: Normal rate, regular rhythm and normal heart sounds.    No murmur heard.  Pulmonary/Chest: Symmetric chest wall expansion. No stridor. He has no wheezes. He has no rhonchi. He has no rales. He exhibits no tenderness.   Abdominal: Soft.   Musculoskeletal: He exhibits no edema.   Lymphadenopathy:     He has no cervical adenopathy.   Neurological: He is alert and oriented to person, place, and time.   Skin: Skin is warm and dry. No rash noted. No erythema. Nails show no clubbing.   Psychiatric: He has a normal mood and affect.   Vitals reviewed.    Personal Diagnostic Review    No flowsheet data found.      Assessment:       1. Lung nodule    2. Shortness of breath    3. Pleural scarring    4. Chronic combined systolic and diastolic congestive heart failure, NYHA class 3    5. CAD, multiple vessel    6. Hiatal hernia with GERD    7. Obesity (BMI 30.0-34.9)        Outpatient Encounter Prescriptions as of 11/16/2017   Medication Sig  Dispense Refill    albuterol (PROAIR HFA) 90 mcg/actuation inhaler Inhale 2 puffs into the lungs every 6 (six) hours as needed for Wheezing. Rescue      aspirin (ECOTRIN) 81 MG EC tablet Take 81 mg by mouth once daily.      atorvastatin (LIPITOR) 80 MG tablet Take 80 mg by mouth once daily.      clopidogrel (PLAVIX) 75 mg tablet Take 75 mg by mouth once daily.      furosemide (LASIX) 40 MG tablet Take 1 tablet (40 mg total) by mouth 2 (two) times daily. 60 tablet 11    lisinopril (PRINIVIL,ZESTRIL) 40 MG tablet Take 40 mg by mouth once daily at 6am.  6    metoprolol succinate (TOPROL-XL) 25 MG 24 hr tablet Take 12.5 mg by mouth once daily.      nitroGLYCERIN (NITROSTAT) 0.4 MG SL tablet DIS 1 T UNT Q FIVE MINUTES UP TO THREE DOSES PRF CHEST PAIN  3    pantoprazole (PROTONIX) 40 MG tablet Take 1 tablet (40 mg total) by mouth once daily. 30 tablet 11     No facility-administered encounter medications on file as of 11/16/2017.      Orders Placed This Encounter   Procedures    CT Chest Without Contrast     Standing Status:   Future     Standing Expiration Date:   11/16/2018     Order Specific Question:   May the Radiologist modify the order per protocol to meet the clinical needs of the patient?     Answer:   Yes    Brain natriuretic peptide     Standing Status:   Future     Number of Occurrences:   1     Standing Expiration Date:   11/16/2018    Spirometry with/without bronchodilator     Standing Status:   Future     Number of Occurrences:   1     Standing Expiration Date:   11/16/2018     Plan:     Pre/Post Spirometry and BNP (phone report).  Consider trial with bronchodilator if there is evidence for obstruction.  Return visit 3 months with repeat CT Chest before.

## 2018-01-02 ENCOUNTER — CLINICAL SUPPORT (OUTPATIENT)
Dept: CARDIOLOGY | Facility: CLINIC | Age: 73
End: 2018-01-02
Payer: MEDICARE

## 2018-01-02 DIAGNOSIS — I25.5 ISCHEMIC CARDIOMYOPATHY: Primary | ICD-10-CM

## 2018-01-02 DIAGNOSIS — Z95.810 AICD (AUTOMATIC CARDIOVERTER/DEFIBRILLATOR) PRESENT: ICD-10-CM

## 2018-01-02 PROCEDURE — 99211 OFF/OP EST MAY X REQ PHY/QHP: CPT | Mod: PBBFAC,PO

## 2018-01-02 PROCEDURE — 93295 DEV INTERROG REMOTE 1/2/MLT: CPT | Mod: ,,, | Performed by: INTERNAL MEDICINE

## 2018-01-02 PROCEDURE — 99999 PR PBB SHADOW E&M-EST. PATIENT-LVL I: CPT | Mod: PBBFAC,,,

## 2018-01-02 PROCEDURE — 93296 REM INTERROG EVL PM/IDS: CPT | Mod: PBBFAC,PO | Performed by: INTERNAL MEDICINE

## 2018-01-26 ENCOUNTER — OFFICE VISIT (OUTPATIENT)
Dept: GASTROENTEROLOGY | Facility: CLINIC | Age: 73
End: 2018-01-26
Payer: MEDICARE

## 2018-01-26 VITALS
HEART RATE: 76 BPM | WEIGHT: 230.19 LBS | SYSTOLIC BLOOD PRESSURE: 130 MMHG | DIASTOLIC BLOOD PRESSURE: 80 MMHG | BODY MASS INDEX: 30.37 KG/M2

## 2018-01-26 DIAGNOSIS — K21.9 HIATAL HERNIA WITH GERD: Primary | ICD-10-CM

## 2018-01-26 DIAGNOSIS — R13.10 DYSPHAGIA, UNSPECIFIED TYPE: ICD-10-CM

## 2018-01-26 DIAGNOSIS — R93.5 ABNORMAL CT OF THE ABDOMEN: ICD-10-CM

## 2018-01-26 DIAGNOSIS — K44.9 HIATAL HERNIA WITH GERD: Primary | ICD-10-CM

## 2018-01-26 PROCEDURE — 99215 OFFICE O/P EST HI 40 MIN: CPT | Mod: S$PBB,,, | Performed by: INTERNAL MEDICINE

## 2018-01-26 PROCEDURE — 99213 OFFICE O/P EST LOW 20 MIN: CPT | Mod: PBBFAC,PO | Performed by: INTERNAL MEDICINE

## 2018-01-26 PROCEDURE — 99999 PR PBB SHADOW E&M-EST. PATIENT-LVL III: CPT | Mod: PBBFAC,,, | Performed by: INTERNAL MEDICINE

## 2018-01-26 NOTE — PROGRESS NOTES
Subjective:       Patient ID: Nimesh Saini is a 72 y.o. male.    Chief Complaint: Abdominal Pain    This is a 72-year-old male who presents for a f/u visit regarding weight loss, dysphagia and reflux.He notes some dysphagia which occurs to both solids and liquids, he is also noted some significant fatigue, weakness, lethargy and malaise. This occurs about twice weekly. He notes increasing dyspnea on exertion, some orthopnea. He does note some substernal burning sensation. This has not responded to omeprazole. An upper endoscopy noted reflux changes and a medium-sized hiatal hernia previously.  The sensation of food getting stuck is in the substernal region..His history also of rectal surgery and reports colonoscopy about 2-3 years ago. Mild improvement on PPI, no overt bleeding. Prior imaging with rectal thickening. We changed his PPI and he is markedly better. Less belching, dysphagia resolved. He has gained 5lbs. Currently he is wearing a defibrillator. No fam hx of esophageal diseases. Quit smoking.      The following portions of the patient's history were reviewed and updated as appropriate: allergies, current medications, past family history, past medical history, past social history, past surgical history and problem list.     (Portions of this note were dictated using voice recognition software and may contain dictation related errors in spelling/grammar/syntax not found on text review)     HPI  Review of Systems   Constitutional: Positive for unexpected weight change. Negative for chills and fever.   HENT: Positive for trouble swallowing. Negative for postnasal drip.    Eyes: Negative for pain and visual disturbance.   Respiratory: Positive for cough and shortness of breath.    Cardiovascular: Positive for leg swelling. Negative for chest pain.   Gastrointestinal: Negative for abdominal distention, anal bleeding, blood in stool, constipation, nausea, rectal pain and vomiting.   Endocrine: Negative for cold  intolerance and heat intolerance.   Genitourinary: Negative for difficulty urinating and hematuria.   Neurological: Negative for dizziness and numbness.   Hematological: Negative for adenopathy. Does not bruise/bleed easily.   Psychiatric/Behavioral: Negative for agitation and confusion.       Objective:      Physical Exam   Constitutional: He is oriented to person, place, and time. He appears well-developed and well-nourished. No distress.   HENT:   Head: Normocephalic and atraumatic.   Eyes: Conjunctivae are normal. No scleral icterus.   Neck: Normal range of motion. Neck supple.   Pulmonary/Chest: He has no wheezes. He has no rales.   Decreased BS, no rhonchi   Abdominal: Soft. Bowel sounds are normal. There is no tenderness.   Neurological: He is alert and oriented to person, place, and time.   Skin: Skin is warm and dry. No erythema.   Psychiatric: He has a normal mood and affect. His behavior is normal. Judgment and thought content normal.   Nursing note and vitals reviewed.      Labs; hct normalized  Imaging; as above  Assessment:       1. Hiatal hernia with GERD    2. Dysphagia, unspecified type    3. Abnormal CT of the abdomen        Plan:   1. EGD, possible flex sig   - risk factors, severe cad/chf, sob  2. Cardiac/pulmonary clearance

## 2018-02-09 PROBLEM — Z95.810 AICD (AUTOMATIC CARDIOVERTER/DEFIBRILLATOR) PRESENT: Status: ACTIVE | Noted: 2018-02-09

## 2018-02-09 NOTE — PROGRESS NOTES
Subjective:      Patient ID: Nimesh Saini is a 72 y.o. male.    Chief Complaint: No chief complaint on file.    HPI:    ROS AICD remote monitoring report downloaded and prepared by med dorinat and interpreted by me.  Full report scanned into media in Epic.  Device OK.     Past Medical History:   Diagnosis Date    Cardiomyopathy     ICM EF 15%    CHF (congestive heart failure)     chronic combined     Coronary artery disease     3 vessel    Diverticulitis large intestine 08/2014    GERD (gastroesophageal reflux disease)     Hypertension     Obesity         Past Surgical History:   Procedure Laterality Date    CARDIAC CATHETERIZATION      coronary angiogram    CARDIAC DEFIBRILLATOR PLACEMENT Left 09/2017    UMBILICAL HERNIA REPAIR         Family History   Problem Relation Age of Onset    Diabetes Mother     Hypertension Father     Stroke Father     Asthma Neg Hx     Emphysema Neg Hx        Social History     Social History    Marital status:      Spouse name: N/A    Number of children: N/A    Years of education: N/A     Social History Main Topics    Smoking status: Former Smoker     Packs/day: 0.25     Years: 10.00     Types: Cigarettes     Start date: 1/1/1963     Quit date: 1/1/1971    Smokeless tobacco: Never Used    Alcohol use No      Comment: socially    Drug use: No    Sexual activity: Not on file     Other Topics Concern    Not on file     Social History Narrative    Retired large . Lives alone. [employer]. [place].         Current Outpatient Prescriptions on File Prior to Visit   Medication Sig Dispense Refill    albuterol (PROAIR HFA) 90 mcg/actuation inhaler Inhale 2 puffs into the lungs every 6 (six) hours as needed for Wheezing. Rescue      aspirin (ECOTRIN) 81 MG EC tablet Take 81 mg by mouth once daily.      atorvastatin (LIPITOR) 80 MG tablet Take 80 mg by mouth once daily.      clopidogrel (PLAVIX) 75 mg tablet Take  75 mg by mouth once daily.      furosemide (LASIX) 40 MG tablet Take 1 tablet (40 mg total) by mouth 2 (two) times daily. 60 tablet 11    lisinopril (PRINIVIL,ZESTRIL) 40 MG tablet Take 40 mg by mouth once daily at 6am.  6    metoprolol succinate (TOPROL-XL) 25 MG 24 hr tablet Take 12.5 mg by mouth once daily.      nitroGLYCERIN (NITROSTAT) 0.4 MG SL tablet DIS 1 T UNT Q FIVE MINUTES UP TO THREE DOSES PRF CHEST PAIN  3    pantoprazole (PROTONIX) 40 MG tablet Take 1 tablet (40 mg total) by mouth once daily. 30 tablet 11     No current facility-administered medications on file prior to visit.        Review of patient's allergies indicates:  No Known Allergies  Objective:   There were no vitals filed for this visit.     Physical Exam     Assessment:     1. Ischemic cardiomyopathy    2. AICD (automatic cardioverter/defibrillator) present      Plan:   Diagnoses and all orders for this visit:    Ischemic cardiomyopathy    AICD (automatic cardioverter/defibrillator) present         No Follow-up on file.

## 2018-02-16 ENCOUNTER — TELEPHONE (OUTPATIENT)
Dept: PULMONOLOGY | Facility: CLINIC | Age: 73
End: 2018-02-16

## 2018-02-16 NOTE — TELEPHONE ENCOUNTER
----- Message from Kamini Lane LPN sent at 2/16/2018 11:21 AM CST -----  Dr. Jay is requesting clearance to perform EGD and possible Flex Sig for this patient.  Diagnosis of weight loss, dysphagia and reflux.

## 2018-03-01 ENCOUNTER — TELEPHONE (OUTPATIENT)
Dept: GASTROENTEROLOGY | Facility: CLINIC | Age: 73
End: 2018-03-01

## 2018-03-01 NOTE — TELEPHONE ENCOUNTER
----- Message from Dalila Rodriguez sent at 3/1/2018 12:21 PM CST -----  Contact: [phone]/self  Patient requesting to speak with you regarding scheduling his surgery. Please advise.

## 2018-03-07 NOTE — TELEPHONE ENCOUNTER
----- Message from Stuart Fried sent at 3/7/2018  3:55 PM CST -----  Contact: Dinora redman/Dr Jan Avendano [phone]  Dinora would like to speak with you regarding the pt medical clearance.  Please call and advise

## 2018-03-07 NOTE — TELEPHONE ENCOUNTER
Informed patient that Dr. Avendano is no longer with [facility]. Patient will schedule cardiology appointment with [facility].

## 2018-03-07 NOTE — TELEPHONE ENCOUNTER
----- Message from Robina Healy sent at 3/7/2018  9:14 AM CST -----  Contact: [phone] self  Patient wanted to know if the doctor has been able to speak with his cardiologist so he can schedule his procedure. Please call and advise.

## 2018-03-08 ENCOUNTER — OFFICE VISIT (OUTPATIENT)
Dept: CARDIOLOGY | Facility: CLINIC | Age: 73
End: 2018-03-08
Payer: MEDICARE

## 2018-03-08 VITALS
HEIGHT: 73 IN | BODY MASS INDEX: 30.76 KG/M2 | DIASTOLIC BLOOD PRESSURE: 82 MMHG | OXYGEN SATURATION: 96 % | SYSTOLIC BLOOD PRESSURE: 124 MMHG | HEART RATE: 69 BPM | WEIGHT: 232.13 LBS

## 2018-03-08 DIAGNOSIS — I34.0 MODERATE MITRAL REGURGITATION: ICD-10-CM

## 2018-03-08 DIAGNOSIS — I25.5 ISCHEMIC CARDIOMYOPATHY: ICD-10-CM

## 2018-03-08 DIAGNOSIS — I10 ESSENTIAL HYPERTENSION: Chronic | ICD-10-CM

## 2018-03-08 DIAGNOSIS — Z95.5 S/P RIGHT CORONARY ARTERY (RCA) STENT PLACEMENT: ICD-10-CM

## 2018-03-08 DIAGNOSIS — Z01.818 PRE-OP EVALUATION: Primary | ICD-10-CM

## 2018-03-08 DIAGNOSIS — Z95.810 AICD (AUTOMATIC CARDIOVERTER/DEFIBRILLATOR) PRESENT: ICD-10-CM

## 2018-03-08 DIAGNOSIS — I25.10 CAD, MULTIPLE VESSEL: ICD-10-CM

## 2018-03-08 PROCEDURE — 99213 OFFICE O/P EST LOW 20 MIN: CPT | Mod: PBBFAC,PO | Performed by: INTERNAL MEDICINE

## 2018-03-08 PROCEDURE — 99214 OFFICE O/P EST MOD 30 MIN: CPT | Mod: S$PBB,,, | Performed by: INTERNAL MEDICINE

## 2018-03-08 PROCEDURE — 99999 PR PBB SHADOW E&M-EST. PATIENT-LVL III: CPT | Mod: PBBFAC,,, | Performed by: INTERNAL MEDICINE

## 2018-03-08 NOTE — PROGRESS NOTES
Subjective:   Patient ID:  Nimesh Saini is a 72 y.o. male who presents for follow-up of Establish Care and Pre-op Exam      Problem List Items Addressed This Visit        Cardiac/Vascular    Hypertension (Chronic)    Ischemic cardiomyopathy    CAD, multiple vessel    AICD (automatic cardioverter/defibrillator) present    Moderate mitral regurgitation      Other Visit Diagnoses     Pre-op evaluation    -  Primary          HPI: 71 y/o male with complicated cardiac history is here for pre op evaluation. He was being cared for by Dr. Jo and Dr. Avendano who have both left the area. He has extensive CAD with diffuse LAD disease, distal LCx disease and RCA disease that is s/p PCI. LAD  is not amendable to intervention. He had ICD placed recently and doing well. No discharge reported. No orthopnea or PND. No chest pain. He occasionally have FARMER but able to walk > 2 blocks and climb 2 flight of stairs. Recent echo read by Dr. Avendano mention moderate sclerotic AV but no stenosis. No CVA history. Not diabetic. BP is controled.  Patient has hiatal hernia and will need EGD. He is having difficulty with constipation and GERD.     Review of Systems   Constitution: Negative.   HENT: Negative.    Eyes: Negative.    Cardiovascular: Negative.    Respiratory: Negative.    Endocrine: Negative.    Hematologic/Lymphatic: Negative.    Skin: Negative.    Musculoskeletal: Negative.    Gastrointestinal: Negative.    Neurological: Negative.    Psychiatric/Behavioral: Negative.    Allergic/Immunologic: Negative.        Patient's Medications   New Prescriptions    No medications on file   Previous Medications    ALBUTEROL (PROAIR HFA) 90 MCG/ACTUATION INHALER    Inhale 2 puffs into the lungs every 6 (six) hours as needed for Wheezing. Rescue    ASPIRIN (ECOTRIN) 81 MG EC TABLET    Take 81 mg by mouth once daily.    ATORVASTATIN (LIPITOR) 80 MG TABLET    Take 80 mg by mouth once daily.    CLOPIDOGREL (PLAVIX) 75 MG TABLET    Take 75 mg by  mouth once daily.    FUROSEMIDE (LASIX) 40 MG TABLET    Take 1 tablet (40 mg total) by mouth 2 (two) times daily.    LISINOPRIL (PRINIVIL,ZESTRIL) 40 MG TABLET    Take 40 mg by mouth once daily at 6am.    METOPROLOL SUCCINATE (TOPROL-XL) 25 MG 24 HR TABLET    Take 12.5 mg by mouth once daily.    NITROGLYCERIN (NITROSTAT) 0.4 MG SL TABLET    DIS 1 T UNT Q FIVE MINUTES UP TO THREE DOSES PRF CHEST PAIN    PANTOPRAZOLE (PROTONIX) 40 MG TABLET    Take 1 tablet (40 mg total) by mouth once daily.   Modified Medications    No medications on file   Discontinued Medications    No medications on file       Objective:   Physical Exam   Constitutional: He is oriented to person, place, and time. He appears well-developed and well-nourished. No distress.   Examination of the digits showed no clubbing or cyanosis   HENT:   Head: Normocephalic and atraumatic.   Eyes: Conjunctivae are normal. Pupils are equal, round, and reactive to light. Right eye exhibits no discharge.   Neck: Normal range of motion. Neck supple. No JVD present. No thyromegaly present.   No carotid bruits   Cardiovascular: Normal rate, regular rhythm, S1 normal, S2 normal, normal heart sounds, intact distal pulses and normal pulses.  PMI is not displaced.  Exam reveals no gallop, no friction rub and no opening snap.    No murmur heard.  Pulmonary/Chest: Effort normal and breath sounds normal. No respiratory distress. He has no wheezes. He has no rales. He exhibits no tenderness.   Abdominal: Soft. Bowel sounds are normal. He exhibits no distension and no mass. There is no tenderness. There is no guarding.   No hepatosplenomegaly   Musculoskeletal: Normal range of motion. He exhibits edema. He exhibits no tenderness.   Lymphadenopathy:     He has no cervical adenopathy.   Neurological: He is alert and oriented to person, place, and time.   Skin: Skin is warm. No rash noted. He is not diaphoretic. No erythema.   Psychiatric: He has a normal mood and affect.    Nursing note and vitals reviewed.      ECGs reviewed- sinus bradycardia with anterior and inferior infarct  LABS reviewed  Imaging including Echoes reviewed- ef 40% with MR    Assessment:     1. Pre-op evaluation    2. AICD (automatic cardioverter/defibrillator) present    3. CAD, multiple vessel    4. Ischemic cardiomyopathy    5. Essential hypertension    6. Moderate mitral regurgitation        Plan:     Patient is moderate risk for intermediate risk procedure. No contraindication to procedure. No recent MI, no decompensated CHF, no severe obstructive valve disease and no tachyarrhythmias. METs>4    Continue current medications  Low salt diet  Activity as tolerate.  F/u in 4 months. ICD interrogation      Clinic time spent with patient discussing and treating medical condition including reviewing images, ECG, labs and medications > 20 minutes.

## 2018-03-08 NOTE — LETTER
March 8, 2018        Hai Jay MD  [address]  [address]  [facility] [address]             [facility]  [address]  [facility] [address]  Phone: [phone]   Patient: Nimesh Saini   MR Number: [id number]   YOB: 1945   Date of Visit: 3/8/2018       Dear Dr. Jay:    Thank you for referring Nimesh Saini to me for evaluation. Below are the relevant portions of my assessment and plan of care.     ECGs reviewed- sinus bradycardia with anterior and inferior infarct  LABS reviewed  Imaging including Echoes reviewed- ef 40% with MR     Assessment:      1. Pre-op evaluation    2. AICD (automatic cardioverter/defibrillator) present    3. CAD, multiple vessel    4. Ischemic cardiomyopathy    5. Essential hypertension    6. Moderate mitral regurgitation          Plan:      Patient is moderate risk for intermediate risk procedure. No contraindication to procedure. No recent MI, no decompensated CHF, no severe obstructive valve disease and no tachyarrhythmias. METs>4     Continue current medications  Low salt diet  Activity as tolerate.  F/u in 4 months. ICD interrogation                 If you have questions, please do not hesitate to call me. I look forward to following Nimesh along with you.    Sincerely,      Demarcus Power MD           CC  No Recipients

## 2018-03-10 RX ORDER — PANTOPRAZOLE SODIUM 40 MG/1
TABLET, DELAYED RELEASE ORAL
Qty: 30 TABLET | Refills: 10 | Status: ON HOLD | OUTPATIENT
Start: 2018-03-10 | End: 2018-12-26 | Stop reason: SDUPTHER

## 2018-03-13 ENCOUNTER — TELEPHONE (OUTPATIENT)
Dept: GASTROENTEROLOGY | Facility: CLINIC | Age: 73
End: 2018-03-13

## 2018-03-13 DIAGNOSIS — K44.9 HIATAL HERNIA WITH GERD: Primary | ICD-10-CM

## 2018-03-13 DIAGNOSIS — R63.4 WEIGHT LOSS: ICD-10-CM

## 2018-03-13 DIAGNOSIS — K21.9 HIATAL HERNIA WITH GERD: Primary | ICD-10-CM

## 2018-03-13 DIAGNOSIS — R13.10 DYSPHAGIA, UNSPECIFIED TYPE: ICD-10-CM

## 2018-03-13 NOTE — TELEPHONE ENCOUNTER
EGD/FLEX SIG scheduled with Dr. Jya on 03/22/2018.Instructions discussed and mailed.  Cardiac and Pulmonary Clearance received. Pre Op appointment scheduled.

## 2018-03-19 ENCOUNTER — TELEPHONE (OUTPATIENT)
Dept: GASTROENTEROLOGY | Facility: CLINIC | Age: 73
End: 2018-03-19

## 2018-03-19 NOTE — TELEPHONE ENCOUNTER
----- Message from Alee Hamilton sent at 3/19/2018  3:23 PM CDT -----  Contact: [phone]/ self   Pt called stating he has a procedure schedule on 03/22/18 and he was told to stop any blood thinner prescriptions . Pt states she got the instructions by mail and reading over the instruction it says to also stop taking aspiring 7 days prior to his procedure . Pt states he was never told to stop taking the aspirin so he is been taking it . Pt want to know where to go from here . Please advise

## 2018-03-22 ENCOUNTER — CLINICAL SUPPORT (OUTPATIENT)
Dept: LAB | Facility: HOSPITAL | Age: 73
End: 2018-03-22
Attending: INTERNAL MEDICINE
Payer: MEDICARE

## 2018-03-22 ENCOUNTER — HOSPITAL ENCOUNTER (OUTPATIENT)
Dept: PREADMISSION TESTING | Facility: HOSPITAL | Age: 73
Discharge: HOME | End: 2018-03-22
Attending: INTERNAL MEDICINE
Payer: MEDICARE

## 2018-03-22 ENCOUNTER — ANESTHESIA EVENT (OUTPATIENT)
Dept: ENDOSCOPY | Facility: HOSPITAL | Age: 73
End: 2018-03-22
Payer: MEDICARE

## 2018-03-22 VITALS
BODY MASS INDEX: 29.39 KG/M2 | HEIGHT: 74 IN | RESPIRATION RATE: 16 BRPM | WEIGHT: 229 LBS | SYSTOLIC BLOOD PRESSURE: 128 MMHG | DIASTOLIC BLOOD PRESSURE: 77 MMHG | OXYGEN SATURATION: 96 % | HEART RATE: 72 BPM

## 2018-03-22 DIAGNOSIS — Z01.818 PRE-OP EXAM: ICD-10-CM

## 2018-03-22 DIAGNOSIS — Z01.818 PRE-OP EXAM: Primary | ICD-10-CM

## 2018-03-22 PROCEDURE — 93005 ELECTROCARDIOGRAM TRACING: CPT

## 2018-03-22 NOTE — DISCHARGE INSTRUCTIONS
· Arrive on  3/27/18.  · Please call the endoscopy department at [phone] for your arrival time.   ·  Report to the 2nd floor Procedural Check In Area.   ·  Nothing to eat or drink after midnight the night before your surgery.  ·  Take your Lisinopril and Metoprolol in the am  with a small sip of water.                                                          ·  Please leave all your jewelery and valuables at home.  ·  Wear loose comfortable clothing.  ·  You will not be able to drive that day, please make arrangement for transportation to and from your procedure.  · Stop Aspirin, Ibuprofen, Advil, Motrin, Aleve, Nuprin and Naprosyn 5 days prior to surgery. You may take Tylenol or Acetaminophen up the day of surgery for any pain.        Call [phone] with any questions.            Upper GI Endoscopy     During endoscopy, a long, flexible tube is used to view the inside of your upper GI tract.      Upper GI endoscopy allows your healthcare provider to look directly into the beginning of your gastrointestinal (GI) tract. The esophagus, stomach, and duodenum (the first part of the small intestine) make up the upper GI tract.   Before the exam  Follow these and any other instructions you are given before your endoscopy. If you dont follow the healthcare providers instructions carefully, the test may need to be canceled or done over:  · Don't eat or drink anything after midnight the night before your exam. If your exam is in the afternoon, drink only clear liquids in the morning. Don't eat or drink anything for 8 hours before the exam. In some cases, you may be able to take medicines with sips of water until 2 hours before the procedure. Speak with your healthcare provider about this.   · Bring your X-rays and any other test results you have.  · Because you will be sedated, arrange for an adult to drive you home after the exam.  · Tell your healthcare provider before the exam if you are taking any medicines or have  any medical problems.  The procedure  Here is what to expect:  · You will lie on the endoscopy table. Usually patients lie on the left side.  · You will be monitored and given oxygen.  · Your throat may be numbed with a spray or gargle. You are given medicine through an intravenous (IV) line that will help you relax and remain comfortable. You may be awake or asleep during the procedure.  · The healthcare provider will put the endoscope in your mouth and down your esophagus. It is thinner than most pieces of food that you swallow. It will not affect your breathing. The medicine helps keep you from gagging.  · Air is put into your GI tract to expand it. It can make you burp.  · During the procedure, the healthcare provider can take biopsies (tissue samples), remove abnormalities, such as polyps, or treat abnormalities through a variety of devices placed through the endoscope. You will not feel this.   · The endoscope carries images of your upper GI tract to a video screen. If you are awake, you may be able to look at the images.  · After the procedure is done, you will rest for a time. An adult must drive you home.  When to call your healthcare provider  Contact your healthcare provider if you have:  · Black or tarry stools, or blood in your stool  · Fever  · Pain in your belly that does not go away  · Nausea and vomiting, or vomiting blood   Date Last Reviewed: 7/1/2016  © 6883-6130 [employer]. [address] [place] [address], [place] [address]. All rights reserved. This information is not intended as a substitute for professional medical care. Always follow your healthcare professional's instructions.

## 2018-03-22 NOTE — ANESTHESIA PREPROCEDURE EVALUATION
03/22/2018  Nimesh Saini is a 72 y.o., male is scheduled for EGD and flex sigmoidscopy under MAC/gen on 3/27/2018.     AICD  Date of implant:  9/2017  Indication  for placement:  Ischemic cardiomyopathy EF 40%  Last interrogation:  2/09/2018 (note in EPIC)    Past Surgical History:   Procedure Laterality Date    CARDIAC DEFIBRILLATOR PLACEMENT Left 09/2017    CORONARY ANGIOPLASTY WITH STENT PLACEMENT  06/2017    coronary angiogram; stents x 5    UMBILICAL HERNIA REPAIR      1990's         Pre-op Assessment    I have reviewed the Patient Summary Reports.    I have reviewed the Nursing Notes.   I have reviewed the Medications.     Review of Systems  Anesthesia Hx:  No problems with previous Anesthesia  History of prior surgery of interest to airway management or planning: Previous anesthesia: General, MAC   Procedure performed at an [facility].  Denies Personal Hx of Anesthesia complications.   Social:  Former Smoker, No Alcohol Use    Hematology/Oncology:  Hematology Normal      Hematology Comments: On plavix and ASA; pt will hold plavix per Tania Hayes. Pt will continue ASA per Tania Hayes.  Pt will resume 24 hours after procedure.  Pt verbalized understanding.    EENT/Dental:EENT/Dental Normal   Cardiovascular:   Pacemaker (AICD placed 9/2017) Hypertension, well controlled Past MI (5/2017) CAD  CABG/stent (stents 6/2017)  Denies Dysrhythmias.   Denies Angina. CHF hyperlipidemia     NYHA Classification I ECG has been reviewed.  Functional Capacity 3.5 METS  Congestive Heart Failure (CHF)  Cardiomyopathy    Pulmonary:   COPD, mild Denies Shortness of breath.   Asbestosis (known pulmonary nodule)   Renal/:  Renal/ Normal     Hepatic/GI:   GERD, poorly controlled Complaints of difficulty digesting food with bloating, abd pain and SOB after meals getting gradually more frequent  (with all meals) for approx 2 years. Pt states has lost approx 80 lbs over past year. Hepatic/GI Symptoms: heartburn, nausea, abdominal pain.  Esophageal / Stomach Disorders Gerd Controlled by chronic antireflux medication.    Musculoskeletal:  Musculoskeletal Normal    Neurological:  Neurology Normal    Endocrine:  Endocrine Normal    Dermatological:  Skin Normal    Psych:  Psychiatric Normal           Physical Exam  General:  Obesity    Airway/Jaw/Neck:  Airway Findings: Mouth Opening: Normal Tongue: Normal  General Airway Assessment: Adult  Mallampati: I  Improves to I with phonation.  TM Distance: Normal, at least 6 cm        Eyes/Ears/Nose:  EYES/EARS/NOSE FINDINGS: Normal   Dental:  Dental Findings: Periodontal disease, Severe    Chest/Lungs:  Chest/Lungs Clear    Heart/Vascular:  Heart Findings: Normal Heart murmur: negative    Abdomen:  Abdomen Findings: Normal      Mental Status:  Mental Status Findings: Normal        Anesthesia Plan  Type of Anesthesia, risks & benefits discussed:  Anesthesia Type:  general, MAC  Patient's Preference:   Intra-op Monitoring Plan:   Intra-op Monitoring Plan Comments:   Post Op Pain Control Plan:   Post Op Pain Control Plan Comments:   Induction:   IV  Beta Blocker:         Informed Consent: Patient understands risks and agrees with Anesthesia plan.  Questions answered.   ASA Score: 3     Day of Surgery Review of History & Physical:        Anesthesia Plan Notes: Anesthesia consent will be obtained prior to procedure on 3/27/2018.

## 2018-03-27 ENCOUNTER — ANESTHESIA (OUTPATIENT)
Dept: ENDOSCOPY | Facility: HOSPITAL | Age: 73
End: 2018-03-27
Payer: MEDICARE

## 2018-03-27 ENCOUNTER — HOSPITAL ENCOUNTER (OUTPATIENT)
Facility: HOSPITAL | Age: 73
Discharge: HOME | End: 2018-03-27
Attending: INTERNAL MEDICINE | Admitting: INTERNAL MEDICINE
Payer: MEDICARE

## 2018-03-27 VITALS
SYSTOLIC BLOOD PRESSURE: 132 MMHG | RESPIRATION RATE: 18 BRPM | DIASTOLIC BLOOD PRESSURE: 90 MMHG | WEIGHT: 229 LBS | TEMPERATURE: 97 F | HEART RATE: 64 BPM | OXYGEN SATURATION: 100 % | HEIGHT: 73 IN | BODY MASS INDEX: 30.35 KG/M2

## 2018-03-27 DIAGNOSIS — R13.10 DYSPHAGIA: ICD-10-CM

## 2018-03-27 PROCEDURE — 37000009 HC ANESTHESIA EA ADD 15 MINS: Performed by: INTERNAL MEDICINE

## 2018-03-27 PROCEDURE — 63600175 PHARM REV CODE 636 W HCPCS: Performed by: NURSE ANESTHETIST, CERTIFIED REGISTERED

## 2018-03-27 PROCEDURE — 45330 DIAGNOSTIC SIGMOIDOSCOPY: CPT | Mod: 51,,, | Performed by: INTERNAL MEDICINE

## 2018-03-27 PROCEDURE — 43235 EGD DIAGNOSTIC BRUSH WASH: CPT | Mod: ,,, | Performed by: INTERNAL MEDICINE

## 2018-03-27 PROCEDURE — 25000003 PHARM REV CODE 250: Performed by: INTERNAL MEDICINE

## 2018-03-27 PROCEDURE — 37000008 HC ANESTHESIA 1ST 15 MINUTES: Performed by: INTERNAL MEDICINE

## 2018-03-27 PROCEDURE — 43235 EGD DIAGNOSTIC BRUSH WASH: CPT | Performed by: INTERNAL MEDICINE

## 2018-03-27 PROCEDURE — 45330 DIAGNOSTIC SIGMOIDOSCOPY: CPT | Performed by: INTERNAL MEDICINE

## 2018-03-27 RX ORDER — PROPOFOL 10 MG/ML
VIAL (ML) INTRAVENOUS
Status: DISCONTINUED | OUTPATIENT
Start: 2018-03-27 | End: 2018-03-27

## 2018-03-27 RX ORDER — PROPOFOL 10 MG/ML
VIAL (ML) INTRAVENOUS CONTINUOUS PRN
Status: DISCONTINUED | OUTPATIENT
Start: 2018-03-27 | End: 2018-03-27

## 2018-03-27 RX ORDER — SODIUM CHLORIDE 9 MG/ML
INJECTION, SOLUTION INTRAVENOUS CONTINUOUS
Status: DISCONTINUED | OUTPATIENT
Start: 2018-03-27 | End: 2018-03-27 | Stop reason: HOSPADM

## 2018-03-27 RX ORDER — LIDOCAINE HCL/PF 100 MG/5ML
SYRINGE (ML) INTRAVENOUS
Status: DISCONTINUED | OUTPATIENT
Start: 2018-03-27 | End: 2018-03-27

## 2018-03-27 RX ADMIN — SODIUM CHLORIDE: 0.9 INJECTION, SOLUTION INTRAVENOUS at 01:03

## 2018-03-27 RX ADMIN — PROPOFOL 150 MCG/KG/MIN: 10 INJECTION, EMULSION INTRAVENOUS at 01:03

## 2018-03-27 RX ADMIN — LIDOCAINE HYDROCHLORIDE 60 MG: 20 INJECTION, SOLUTION INTRAVENOUS at 01:03

## 2018-03-27 RX ADMIN — PROPOFOL 50 MG: 10 INJECTION, EMULSION INTRAVENOUS at 01:03

## 2018-03-27 NOTE — ANESTHESIA PREPROCEDURE EVALUATION
03/27/2018  Nimesh Saini is a 72 y.o., male here for EGD.  Active - gardens and attends to animals and [place].  Chest - clear to auscultation, heart RRR.    Pre-operative evaluation for Procedure(s) (LRB):  ESOPHAGOGASTRODUODENOSCOPY (EGD) (N/A)        Patient Active Problem List   Diagnosis    Hypertension    Erectile dysfunction    Obesity (BMI 30.0-34.9)    Epigastric abdominal pain    Hiatal hernia with GERD    Shortness of breath    Ischemic cardiomyopathy    CAD, multiple vessel    Combined systolic and diastolic congestive heart failure, NYHA class 3    Chest pain, atypical    Pleural effusion    History of asbestos exposure    Lung nodule    Pleural scarring    AICD (automatic cardioverter/defibrillator) present    Moderate mitral regurgitation    S/P right coronary artery (RCA) stent placement       Review of patient's allergies indicates:  No Known Allergies    Current Outpatient Prescriptions on File Prior to Visit   Medication Sig Dispense Refill    albuterol (PROAIR HFA) 90 mcg/actuation inhaler Inhale 2 puffs into the lungs every 6 (six) hours as needed for Wheezing. Rescue      aspirin (ECOTRIN) 81 MG EC tablet Take 81 mg by mouth once daily.      atorvastatin (LIPITOR) 80 MG tablet Take 80 mg by mouth once daily.      clopidogrel (PLAVIX) 75 mg tablet Take 75 mg by mouth once daily.      furosemide (LASIX) 40 MG tablet Take 1 tablet (40 mg total) by mouth 2 (two) times daily. 60 tablet 11    lisinopril (PRINIVIL,ZESTRIL) 40 MG tablet Take 40 mg by mouth once daily at 6am.  6    metoprolol succinate (TOPROL-XL) 25 MG 24 hr tablet Take 12.5 mg by mouth once daily.      nitroGLYCERIN (NITROSTAT) 0.4 MG SL tablet DIS 1 T UNT Q FIVE MINUTES UP TO THREE DOSES PRF CHEST PAIN  3    pantoprazole (PROTONIX) 40 MG tablet TAKE 1 TABLET (40MG TOTAL) BY MOUTH  ONCE DAILY 30 tablet 10     No current facility-administered medications on file prior to visit.        Past Surgical History:   Procedure Laterality Date    CARDIAC DEFIBRILLATOR PLACEMENT Left 09/2017    CORONARY ANGIOPLASTY WITH STENT PLACEMENT  06/2017    coronary angiogram; stents x 5    UMBILICAL HERNIA REPAIR      1990's       Social History     Social History    Marital status:      Spouse name: N/A    Number of children: N/A    Years of education: N/A     Occupational History    Not on file.     Social History Main Topics    Smoking status: Former Smoker     Packs/day: 0.25     Years: 10.00     Types: Cigarettes     Start date: 1/1/1963     Quit date: 1/1/1971    Smokeless tobacco: Never Used    Alcohol use No      Comment: socially    Drug use: No    Sexual activity: Not on file     Other Topics Concern    Not on file     Social History Narrative    Retired large . Lives alone. [employer]. [place] .           Vital Signs Range (Last 24H):         CBC: No results for input(s): WBC, RBC, HGB, HCT, PLT, MCV, MCH, MCHC in the last 72 hours.    CMP: No results for input(s): NA, K, CL, CO2, BUN, CREATININE, GLU, MG, PHOS, CALCIUM, ALBUMIN, PROT, ALKPHOS, ALT, AST, BILITOT in the last 72 hours.    INR  No results for input(s): PT, INR, PROTIME, APTT in the last 72 hours.    CONCLUSIONS    ECHO    6/2017    1 - Mildly to moderately depressed left ventricular systolic function (EF 40-45%).     2 - Indeterminate LV diastolic function.     3 - Normal right ventricular systolic function .     4 - Moderate mitral regurgitation.     5 - Mild tricuspid regurgitation.     6 - No pericardial effusion seen          Pre-op Assessment    I have reviewed the Patient Summary Reports.     I have reviewed the Nursing Notes.   I have reviewed the Medications.     Review of Systems  Anesthesia Hx:  No problems with previous Anesthesia   Denies Personal Hx of  Anesthesia complications.   Hematology/Oncology:  Hematology Normal   Oncology Normal     EENT/Dental:EENT/Dental Normal   Cardiovascular:   CAD   CHF NYHA Classification I ECG has been reviewed.    Pulmonary:   Shortness of breath    Renal/:  Renal/ Normal     Hepatic/GI:   GERD Complaints of difficulty passing food from lower esophagus to stomach, no symptoms this am   Musculoskeletal:  Musculoskeletal Normal    Neurological:  Neurology Normal    Endocrine:  Endocrine Normal    Dermatological:  Skin Normal    Psych:  Psychiatric Normal           Physical Exam  General:  Well nourished    Airway/Jaw/Neck:  Airway Findings: Mouth Opening: Normal Tongue: Normal  General Airway Assessment: Adult  Mallampati: I  Improves to I with phonation.  TM Distance: Normal, at least 6 cm  Jaw/Neck Findings:  Neck ROM: Normal ROM     Eyes/Ears/Nose:  Eyes/Ears/Nose Findings:    Dental:  Dental Findings: In tact, Upper Dentures    Chest/Lungs:  Chest/Lungs Findings: Clear to auscultation, Normal Respiratory Rate     Heart/Vascular:  Heart Findings: Rate: Normal  Rhythm: Regular Rhythm  Sounds: Normal  Heart Murmur  Vascular Findings:        Mental Status:  Mental Status Findings:  Cooperative, Alert and Oriented         Anesthesia Plan  Type of Anesthesia, risks & benefits discussed:  Anesthesia Type:  general, MAC  Patient's Preference: General/MAC  Intra-op Monitoring Plan:   Intra-op Monitoring Plan Comments:   Post Op Pain Control Plan:   Post Op Pain Control Plan Comments: IV meds as needed  Induction:   IV  Beta Blocker:  Patient is on a Beta-Blocker and has received one dose within the past 24 hours (No further documentation required).       Informed Consent: Patient understands risks and agrees with Anesthesia plan.  Questions answered. Anesthesia consent signed with patient.  ASA Score: 3     Day of Surgery Review of History & Physical:    H&P update referred to the provider.     Anesthesia Plan Notes: Discussed  anesthetic options, pt understands and agrees with plan        Ready For Surgery From Anesthesia Perspective.

## 2018-03-27 NOTE — PROVATION PATIENT INSTRUCTIONS
Discharge Summary/Instructions after an Endoscopic Procedure  Patient Name: Nimesh Saini  Patient MRN: [id number]  Patient YOB: 1945  Tuesday, March 27, 2018  Hai Jay MD  RESTRICTIONS:  During your procedure today, you received medications for sedation.  These   medications may affect your judgment, balance and coordination.  Therefore,   for 24 hours, you have the following restrictions:   - DO NOT drive a car, operate machinery, make legal/financial decisions,   sign important papers or drink alcohol.    ACTIVITY:  The following day: return to full activity including work, except no heavy   lifting, straining or running for 3 days if polyps were removed.  DIET:  Eat and drink normally unless instructed otherwise.     TREATMENT FOR COMMON SIDE EFFECTS:  - Mild abdominal pain, nausea, belching, bloating or excessive gas:  rest,   eat lightly and use a heating pad.  - Sore Throat: treat with throat lozenges and/or gargle with warm salt   water.  - Because air was used during the procedure, expelling large amounts of air   from your rectum or belching is normal.  - If a bowel prep was taken, you may not have a bowel movement for 1-3 days.    This is normal.  SYMPTOMS TO WATCH FOR AND REPORT TO YOUR PHYSICIAN:  1. Abdominal pain or bloating, other than gas cramps.  2. Chest pain.  3. Back pain.  4. Signs of infection such as: chills or fever occurring within 24 hours   after the procedure.  5. Rectal bleeding, which would show as bright red, maroon, or black stools.   (A tablespoon of blood from the rectum is not serious, especially if   hemorrhoids are present.)  6. Vomiting.  7. Weakness or dizziness.  GO DIRECTLY TO THE NEAREST EMERGENCY ROOM IF YOU HAVE ANY OF THE FOLLOWING:      Difficulty breathing              Chills and/or fever over 101 F   Persistent vomiting and/or vomiting blood   Severe abdominal pain   Severe chest pain   Black, tarry stools   Bleeding- more than one tablespoon   Any  other symptom or condition that you feel may need urgent attention  Your doctor recommends these additional instructions:  If any biopsies were taken, your doctors clinic will contact you in 1 to 2   weeks with any results.  You have a contact number available for emergencies.  The signs and symptoms   of potential delayed complications were discussed with you.  You may return   to normal activities tomorrow.  Written discharge instructions were   provided to you.   Resume your previous diet.  For questions, problems or results please call your physician - Hai Jay MD at Work:  ( [phone].  EMERGENCY PHONE NUMBER: [phone],  LAB RESULTS: [phone]  IF A COMPLICATION OR EMERGENCY SITUATION ARISES AND YOU ARE UNABLE TO REACH   YOUR PHYSICIAN - GO DIRECTLY TO THE EMERGENCY ROOM.  Hai Jay MD  3/27/2018 2:04:07 PM  This report has been verified and signed electronically.

## 2018-03-27 NOTE — PROVATION PATIENT INSTRUCTIONS
Discharge Summary/Instructions after an Endoscopic Procedure  Patient Name: Nimesh Saini  Patient MRN: [id number]  Patient YOB: 1945  Tuesday, March 27, 2018  Hai Jay MD  RESTRICTIONS:  During your procedure today, you received medications for sedation.  These   medications may affect your judgment, balance and coordination.  Therefore,   for 24 hours, you have the following restrictions:   - DO NOT drive a car, operate machinery, make legal/financial decisions,   sign important papers or drink alcohol.    ACTIVITY:  The following day: return to full activity including work, except no heavy   lifting, straining or running for 3 days if polyps were removed.  DIET:  Eat and drink normally unless instructed otherwise.     TREATMENT FOR COMMON SIDE EFFECTS:  - Mild abdominal pain, nausea, belching, bloating or excessive gas:  rest,   eat lightly and use a heating pad.  - Sore Throat: treat with throat lozenges and/or gargle with warm salt   water.  - Because air was used during the procedure, expelling large amounts of air   from your rectum or belching is normal.  - If a bowel prep was taken, you may not have a bowel movement for 1-3 days.    This is normal.  SYMPTOMS TO WATCH FOR AND REPORT TO YOUR PHYSICIAN:  1. Abdominal pain or bloating, other than gas cramps.  2. Chest pain.  3. Back pain.  4. Signs of infection such as: chills or fever occurring within 24 hours   after the procedure.  5. Rectal bleeding, which would show as bright red, maroon, or black stools.   (A tablespoon of blood from the rectum is not serious, especially if   hemorrhoids are present.)  6. Vomiting.  7. Weakness or dizziness.  GO DIRECTLY TO THE NEAREST EMERGENCY ROOM IF YOU HAVE ANY OF THE FOLLOWING:      Difficulty breathing              Chills and/or fever over 101 F   Persistent vomiting and/or vomiting blood   Severe abdominal pain   Severe chest pain   Black, tarry stools   Bleeding- more than one tablespoon   Any  other symptom or condition that you feel may need urgent attention  Your doctor recommends these additional instructions:  If any biopsies were taken, your doctors clinic will contact you in 1 to 2   weeks with any results.  You are being discharged to home.   You have a contact number available for emergencies.  The signs and symptoms   of potential delayed complications were discussed with you.  You may return   to normal activities tomorrow.  Written discharge instructions were   provided to you.   Resume your previous diet.   Continue your present medications.  For questions, problems or results please call your physician - Hai Jay MD at Work:  [phone].  EMERGENCY PHONE NUMBER: [phone],  LAB RESULTS: [phone]  IF A COMPLICATION OR EMERGENCY SITUATION ARISES AND YOU ARE UNABLE TO REACH   YOUR PHYSICIAN - GO DIRECTLY TO THE EMERGENCY ROOM.  Hai Jay MD  3/27/2018 1:38:04 PM  This report has been verified and signed electronically.

## 2018-03-27 NOTE — ANESTHESIA POSTPROCEDURE EVALUATION
"Anesthesia Post Evaluation    Patient: Nimesh Saini    Procedure(s) Performed: Procedure(s) (LRB):  ESOPHAGOGASTRODUODENOSCOPY (EGD) (N/A)  SIGMOIDOSCOPY-FLEXIBLE (N/A)    Final Anesthesia Type: MAC  Patient location during evaluation: GI PACU  Patient participation: Yes- Able to Participate  Level of consciousness: awake and alert and oriented  Post-procedure vital signs: reviewed and stable  Pain management: adequate  Airway patency: patent  PONV status at discharge: No PONV  Anesthetic complications: no      Cardiovascular status: blood pressure returned to baseline and hemodynamically stable  Respiratory status: unassisted, room air and spontaneous ventilation  Hydration status: euvolemic  Follow-up not needed.        Visit Vitals  BP (!) 140/85 (BP Location: Left arm, Patient Position: Lying)   Pulse 68   Temp 36.4 °C (97.5 °F) (Oral)   Resp 20   Ht 6' 1" (1.854 m)   Wt 103.9 kg (229 lb)   SpO2 95%   BMI 30.21 kg/m²       Pain/Mariaelena Score: Pain Assessment Performed: Yes (3/27/2018 12:58 PM)  Presence of Pain: denies (3/27/2018 12:58 PM)      "

## 2018-03-27 NOTE — TRANSFER OF CARE
"Anesthesia Transfer of Care Note    Patient: Nimesh Saini    Procedure(s) Performed: Procedure(s) (LRB):  ESOPHAGOGASTRODUODENOSCOPY (EGD) (N/A)  SIGMOIDOSCOPY-FLEXIBLE (N/A)    Patient location: GI    Anesthesia Type: MAC    Transport from OR: Transported from OR on room air with adequate spontaneous ventilation    Post pain: adequate analgesia    Post assessment: no apparent anesthetic complications and tolerated procedure well    Post vital signs: stable    Level of consciousness: awake, alert and oriented    Nausea/Vomiting: no nausea/vomiting    Complications: none    Transfer of care protocol was followed      Last vitals:   Visit Vitals  BP (!) 140/85 (BP Location: Left arm, Patient Position: Lying)   Pulse 68   Temp 36.4 °C (97.5 °F) (Oral)   Resp 20   Ht 6' 1" (1.854 m)   Wt 103.9 kg (229 lb)   SpO2 95%   BMI 30.21 kg/m²     "

## 2018-04-03 NOTE — H&P
[facility]  Gastroenterology  H&P    Patient Name: Nimesh Saini  MRN: [id number]  Admission Date: 3/27/2018  Code Status: Prior    Attending Provider: No att. providers found   Primary Care Physician: Stephane Fuentes MD  Principal Problem:<principal problem not specified>    Subjective:     History of Present Illness: Abnormal imaging/dysphagia    Past Medical History:   Diagnosis Date    Cardiomyopathy     ICM EF 15%    CHF (congestive heart failure)     chronic combined     Coronary artery disease     3 vessel    Diverticulitis large intestine 08/2014    GERD (gastroesophageal reflux disease)     Hypertension     Obesity        Past Surgical History:   Procedure Laterality Date    CARDIAC DEFIBRILLATOR PLACEMENT Left 09/2017    CORONARY ANGIOPLASTY WITH STENT PLACEMENT  06/2017    coronary angiogram; stents x 5    UMBILICAL HERNIA REPAIR      1990's       Review of patient's allergies indicates:  No Known Allergies  Family History     Problem Relation (Age of Onset)    Diabetes Mother    Hypertension Father    Stroke Father        Social History Main Topics    Smoking status: Former Smoker     Packs/day: 0.25     Years: 10.00     Types: Cigarettes     Start date: 1/1/1963     Quit date: 1/1/1971    Smokeless tobacco: Never Used    Alcohol use No      Comment: socially    Drug use: No    Sexual activity: Not on file     Review of Systems   Constitutional: Negative for appetite change and unexpected weight change.   Gastrointestinal: Negative for abdominal distention and blood in stool.     Objective:     Vital Signs (Most Recent):  Temp: 97.4 °F (36.3 °C) (03/27/18 1345)  Pulse: 64 (03/27/18 1415)  Resp: 18 (03/27/18 1415)  BP: (!) 132/90 (03/27/18 1415)  SpO2: 100 % (03/27/18 1415) Vital Signs (24h Range):        Weight: 103.9 kg (229 lb) (03/27/18 1250)  Body mass index is 30.21 kg/m².    No intake or output data in the 24 hours ending 04/03/18 1005    Lines/Drains/Airways      Peripheral Intravenous Line                 Peripheral IV - Single Lumen 03/27/18 1259 Right Forearm 6 days                Physical Exam   Constitutional: He is oriented to person, place, and time. He appears well-developed and well-nourished. No distress.   HENT:   Head: Normocephalic and atraumatic.   Eyes: Conjunctivae are normal. No scleral icterus.   Pulmonary/Chest: Effort normal. No respiratory distress.   Abdominal: Soft. Bowel sounds are normal. He exhibits no distension. There is no tenderness.   Neurological: He is alert and oriented to person, place, and time.   Skin: Skin is warm and dry.   Psychiatric: He has a normal mood and affect. His behavior is normal. Thought content normal.   Nursing note and vitals reviewed.      Assessment/Plan:     Active Diagnoses:    Diagnosis Date Noted POA    Dysphagia [R13.10] 03/27/2018 Yes      Problems Resolved During this Admission:    Diagnosis Date Noted Date Resolved POA       Plan. EGD, flex sig for abnormal imaging    Hai Jay MD  Gastroenterology  [facility]

## 2018-11-08 ENCOUNTER — OFFICE VISIT (OUTPATIENT)
Dept: CARDIOLOGY | Facility: CLINIC | Age: 73
End: 2018-11-08
Payer: MEDICARE

## 2018-11-08 VITALS
HEART RATE: 78 BPM | WEIGHT: 242 LBS | DIASTOLIC BLOOD PRESSURE: 80 MMHG | SYSTOLIC BLOOD PRESSURE: 140 MMHG | HEIGHT: 70 IN | BODY MASS INDEX: 34.65 KG/M2

## 2018-11-08 DIAGNOSIS — Z95.5 S/P RIGHT CORONARY ARTERY (RCA) STENT PLACEMENT: ICD-10-CM

## 2018-11-08 DIAGNOSIS — I34.0 MODERATE MITRAL REGURGITATION: ICD-10-CM

## 2018-11-08 DIAGNOSIS — I50.42 CHRONIC COMBINED SYSTOLIC AND DIASTOLIC CONGESTIVE HEART FAILURE, NYHA CLASS 3: Primary | Chronic | ICD-10-CM

## 2018-11-08 DIAGNOSIS — I25.10 CAD, MULTIPLE VESSEL: ICD-10-CM

## 2018-11-08 DIAGNOSIS — I10 ESSENTIAL HYPERTENSION: Chronic | ICD-10-CM

## 2018-11-08 DIAGNOSIS — I25.5 ISCHEMIC CARDIOMYOPATHY: ICD-10-CM

## 2018-11-08 DIAGNOSIS — Z95.810 AICD (AUTOMATIC CARDIOVERTER/DEFIBRILLATOR) PRESENT: ICD-10-CM

## 2018-11-08 PROCEDURE — 99213 OFFICE O/P EST LOW 20 MIN: CPT | Mod: PBBFAC,PO | Performed by: INTERNAL MEDICINE

## 2018-11-08 PROCEDURE — 99214 OFFICE O/P EST MOD 30 MIN: CPT | Mod: S$PBB,,, | Performed by: INTERNAL MEDICINE

## 2018-11-08 PROCEDURE — 99999 PR PBB SHADOW E&M-EST. PATIENT-LVL III: CPT | Mod: PBBFAC,,, | Performed by: INTERNAL MEDICINE

## 2018-11-08 NOTE — PROGRESS NOTES
Subjective:   Patient ID:  Nimesh Saini is a 73 y.o. male who presents for follow-up of No chief complaint on file.      Problem List Items Addressed This Visit        Cardiac/Vascular    Hypertension (Chronic)    Combined systolic and diastolic congestive heart failure, NYHA class 3 - Primary (Chronic)    Ischemic cardiomyopathy    CAD, multiple vessel    AICD (automatic cardioverter/defibrillator) present    Moderate mitral regurgitation    S/P right coronary artery (RCA) stent placement          HPI: 71 y/o male with complicated cardiac history is here for f/u. He is doing well. He has extensive CAD with diffuse LAD disease, distal LCx disease and RCA disease that is s/p PCI. LAD  is not amendable to intervention. He has ICD with no shocks. No orthopnea or PND. No chest pain. He occasionally have FARMER but able to walk > 2 blocks.  He is still being seen by Dr. Avendano who stopped lisinopril and started him on Entresto.    Review of Systems   Constitution: Negative.   HENT: Negative.    Eyes: Negative.    Cardiovascular: Negative.    Respiratory: Negative.    Endocrine: Negative.    Hematologic/Lymphatic: Negative.    Skin: Negative.    Musculoskeletal: Negative.    Gastrointestinal: Negative.    Neurological: Negative.    Psychiatric/Behavioral: Negative.    Allergic/Immunologic: Negative.           Medication List           Accurate as of 11/8/18  8:30 AM. If you have any questions, ask your nurse or doctor.               CONTINUE taking these medications    aspirin 81 MG EC tablet  Commonly known as:  ECOTRIN     atorvastatin 80 MG tablet  Commonly known as:  LIPITOR     clopidogrel 75 mg tablet  Commonly known as:  PLAVIX     furosemide 40 MG tablet  Commonly known as:  LASIX  Take 1 tablet (40 mg total) by mouth 2 (two) times daily.     lisinopril 40 MG tablet  Commonly known as:  PRINIVIL,ZESTRIL     metoprolol succinate 25 MG 24 hr tablet  Commonly known as:  TOPROL-XL     nitroGLYCERIN 0.4 MG SL  tablet  Commonly known as:  NITROSTAT     * pantoprazole 40 MG tablet  Commonly known as:  PROTONIX  Take 1 tablet (40 mg total) by mouth once daily.     * pantoprazole 40 MG tablet  Commonly known as:  PROTONIX  TAKE 1 TABLET (40MG TOTAL) BY MOUTH ONCE DAILY     PROAIR HFA 90 mcg/actuation inhaler  Generic drug:  albuterol         * This list has 2 medication(s) that are the same as other medications prescribed for you. Read the directions carefully, and ask your doctor or other care provider to review them with you.                Objective:   Physical Exam   Constitutional: He is oriented to person, place, and time. He appears well-developed and well-nourished. No distress.   Examination of the digits showed no clubbing or cyanosis   HENT:   Head: Normocephalic and atraumatic.   Eyes: Conjunctivae are normal. Pupils are equal, round, and reactive to light. Right eye exhibits no discharge.   Neck: Normal range of motion. Neck supple. No JVD present. No thyromegaly present.   No carotid bruits   Cardiovascular: Normal rate, regular rhythm, S1 normal, S2 normal, normal heart sounds, intact distal pulses and normal pulses. PMI is not displaced. Exam reveals no gallop, no friction rub and no opening snap.   No murmur heard.  Pulmonary/Chest: Effort normal and breath sounds normal. No respiratory distress. He has no wheezes. He has no rales. He exhibits no tenderness.   Abdominal: Soft. Bowel sounds are normal. He exhibits no distension and no mass. There is no tenderness. There is no guarding.   No hepatosplenomegaly   Musculoskeletal: Normal range of motion. He exhibits no edema or tenderness.   Lymphadenopathy:     He has no cervical adenopathy.   Neurological: He is alert and oriented to person, place, and time.   Skin: Skin is warm. No rash noted. He is not diaphoretic. No erythema.   Psychiatric: He has a normal mood and affect.   Nursing note and vitals reviewed.      ECGs reviewed- sinus bradycardia with  anterior and inferior infarct  LABS reviewed  Imaging including Echoes reviewed- ef 40% with MR    Assessment:     1. Chronic combined systolic and diastolic congestive heart failure, NYHA class 3    2. Essential hypertension    3. Ischemic cardiomyopathy    4. CAD, multiple vessel    5. AICD (automatic cardioverter/defibrillator) present    6. Moderate mitral regurgitation    7. S/P right coronary artery (RCA) stent placement        Plan:     Patient is doing well.     Continue current medications  Low salt diet  Activity as tolerate.  F/u in 4 months. ICD interrogation      Clinic time spent with patient discussing and treating medical condition including reviewing images, ECG, labs and medications > 20 minutes.

## 2018-12-25 PROBLEM — I63.9 STROKE: Status: ACTIVE | Noted: 2018-12-25

## 2018-12-26 ENCOUNTER — TELEPHONE (OUTPATIENT)
Dept: FAMILY MEDICINE | Facility: CLINIC | Age: 73
End: 2018-12-26

## 2018-12-26 PROBLEM — J90 PLEURAL EFFUSION: Status: RESOLVED | Noted: 2017-07-12 | Resolved: 2018-12-26

## 2018-12-26 PROBLEM — R10.13 EPIGASTRIC ABDOMINAL PAIN: Status: RESOLVED | Noted: 2017-01-12 | Resolved: 2018-12-26

## 2018-12-26 PROBLEM — R06.02 SHORTNESS OF BREATH: Status: RESOLVED | Noted: 2017-05-02 | Resolved: 2018-12-26

## 2018-12-26 PROBLEM — R07.89 CHEST PAIN, ATYPICAL: Status: RESOLVED | Noted: 2017-07-12 | Resolved: 2018-12-26

## 2018-12-26 PROBLEM — R13.10 DYSPHAGIA: Status: RESOLVED | Noted: 2018-03-27 | Resolved: 2018-12-26

## 2018-12-26 NOTE — TELEPHONE ENCOUNTER
----- Message from Stuart Fried sent at 12/26/2018 10:42 AM CST -----  Contact: [phone]/aike  Patient needs to be seen as new patient for a hospital follow-up.

## 2018-12-28 ENCOUNTER — PATIENT OUTREACH (OUTPATIENT)
Dept: ADMINISTRATIVE | Facility: CLINIC | Age: 73
End: 2018-12-28

## 2018-12-28 RX ORDER — FUROSEMIDE 40 MG/1
40 TABLET ORAL DAILY PRN
COMMUNITY
End: 2019-01-28

## 2018-12-28 NOTE — PATIENT INSTRUCTIONS
Discharge Instructions for Stroke  You have been diagnosed with a stroke, or with a TIA (transient ischemic attack). Or you have been identified as having a high risk for stroke. During a stroke, blood stops flowing to part of your brain. This can damage areas in the brain that control other parts of the body. Symptoms after a stroke depend on which part of the brain has been affected.  Stroke risk factors  Once youve had a stroke, youre at greater risk for another one. Listed below are some other factors that can increase your risk for a stroke:  · High blood pressure  · High cholesterol  · Cigarette or cigar smoking  · Diabetes  · Carotid or other artery disease  · Atrial fibrillation, atrial flutter, or other heart disease  · Not being physically active  · Obesity  · Certain blood disorders (such as sickle cell anemia)  · Excessive alcohol use  · Abuse of street drugs  · Race  · Gender  · Family history of stroke  · Diet high in salty, fried, or greasy foods  Changes in daily living  Doing your regular tasks may be difficult after youve had a stroke, but you can learn new ways to manage your daily activities. In fact, doing daily activities may help you to regain muscle strength and bring back function to affected limbs. Be patient, give yourself time to adjust, and appreciate the progress you make.  Daily activities  You may be at risk of falling. Make changes to your home to help you walk more easily. A therapist will decide if you need an assistive device to walk safely.  You may need to see an occupational therapist or physical therapist to learn new ways of doing things. For example, you may need to make adjustments when bathing or dressing:  Tips for showering or bathing  · Test the water temperature with a hand or foot that was not affected by the stroke.  · Use grab bars, a shower seat, a hand-held showerhead, and a long-handled brush.  Tips for getting dressed  · Dress while sitting, starting with the  affected side or limb.  · Wear shirts that pull easily over your head. Wear pants or skirts with elastic waistbands.  · Use zippers with loops attached to the pull tabs.  Lifestyle changes  · Take your medicines exactly as directed. Dont skip doses.  · Begin an exercise program. Ask your provider how to get started. Also ask how much activity you should try to get on a daily or weekly basis. You can benefit from simple activities such as walking or gardening.  · Limit alcohol intake. Men should have no more than 2 alcoholic drinks a day. Women should limit themselves to 1 alcoholic drink per day.  · Know your cholesterol level. Follow your providers recommendations about how to keep cholesterol under control.  · If you are a smoker, quit now. Join a stop-smoking program to improve your chances of success. Ask your provider about medicines or other methods to help you quit.  · Learn stress management techniques to help you deal with stress in your home and work life.  Diet  Your healthcare provider will give you information on dietary changes that you may need to make, based on your situation. Your provider may recommend that you see a registered dietitian for help with diet changes. Changes may include:  · Reducing fat and cholesterol intake  · Reducing salt (sodium) intake, especially if you have high blood pressure  · Eating more fresh vegetables and fruits  · Eating more lean proteins, such as fish, poultry, and beans and peas (legumes)  · Eating less red meat and processed meats  · Using low-fat dairy products  · Limiting vegetable oils and nut oils  · Limiting sweets and processed foods such as chips, cookies, and baked goods  Follow-up care  · Keep your medical appointments. Close follow-up is important to stroke rehabilitation and recovery.  · Some medicines require blood tests to check for progress or problems. Keep follow-up appointments for any blood tests ordered by your providers.  When to call  911  Call 911 right away if you have any of the following symptoms of stroke:  · Weakness, tingling, or loss of feeling on one side of your face or body  · Sudden double vision or trouble seeing in one or both eyes  · Sudden trouble talking or slurred speech  · Trouble understanding others  · Sudden, severe headache  · Dizziness, loss of balance, or a sense of falling  · Blackouts or seizures      F.A.S.T. is an easy way to remember the signs of stroke. When you see these signs, you know that you need to call 911 fast.  F.A.S.T. stands for:  · F is for face drooping. One side of the face is drooping or numb. When the person smiles, the smile is uneven.  · A is for arm weakness. One arm is weak or numb. When the person lifts both arms at the same time, one arm may drift downward.  · S is for speech difficulty. You may notice slurred speech or trouble speaking. The person can't repeat a simple sentence correctly when asked.  · T is for time to call 911. If someone shows any of these symptoms, even if they go away, call 911 immediately. Make note of the time the symptoms first appeared.  Date Last Reviewed: 8/26/2015 © 2000-2018[employer]. [address] [place] [address], [place] [address]. All rights reserved. This information is not intended as a substitute for professional medical care. Always follow your healthcare professional's instructions.

## 2018-12-28 NOTE — PROGRESS NOTES
[phone] [facility]  [phone] [facility]  C3 nurse attempted to contact patient. No answer. The following message was left for the patient to return the call:  Good morning I am a nurse calling on behalf of [facility] from the [facility].  This is a Transitional Care Call for Nimesh Saini. When you have a moment please contact us at [phone] or [phone] Monday through Friday, between the hours of 8 am to 4 pm. We look forward to speaking with you. On behalf of [facility] have a nice day.    The patient does not have a scheduled HOSFU appointment within 7-14 days post hospital discharge date 12/26/18. Message sent to Physician staff to assist with HOSFU appointment scheduling.

## 2019-01-04 ENCOUNTER — OFFICE VISIT (OUTPATIENT)
Dept: NEUROLOGY | Facility: CLINIC | Age: 74
End: 2019-01-04
Payer: MEDICARE

## 2019-01-04 VITALS
SYSTOLIC BLOOD PRESSURE: 142 MMHG | HEIGHT: 73 IN | DIASTOLIC BLOOD PRESSURE: 81 MMHG | BODY MASS INDEX: 34.42 KG/M2 | HEART RATE: 68 BPM | WEIGHT: 259.69 LBS

## 2019-01-04 DIAGNOSIS — I63.9 CEREBROVASCULAR ACCIDENT (CVA), UNSPECIFIED MECHANISM: Primary | ICD-10-CM

## 2019-01-04 DIAGNOSIS — R29.898 LEFT LEG WEAKNESS: ICD-10-CM

## 2019-01-04 PROCEDURE — 99214 OFFICE O/P EST MOD 30 MIN: CPT | Mod: PBBFAC,PO | Performed by: PSYCHIATRY & NEUROLOGY

## 2019-01-04 PROCEDURE — 99999 PR PBB SHADOW E&M-EST. PATIENT-LVL IV: CPT | Mod: PBBFAC,,, | Performed by: PSYCHIATRY & NEUROLOGY

## 2019-01-04 PROCEDURE — 99205 PR OFFICE/OUTPT VISIT, NEW, LEVL V, 60-74 MIN: ICD-10-PCS | Mod: S$PBB,,, | Performed by: PSYCHIATRY & NEUROLOGY

## 2019-01-04 PROCEDURE — 99999 PR PBB SHADOW E&M-EST. PATIENT-LVL IV: ICD-10-PCS | Mod: PBBFAC,,, | Performed by: PSYCHIATRY & NEUROLOGY

## 2019-01-04 PROCEDURE — 99205 OFFICE O/P NEW HI 60 MIN: CPT | Mod: S$PBB,,, | Performed by: PSYCHIATRY & NEUROLOGY

## 2019-01-04 RX ORDER — CLOPIDOGREL BISULFATE 75 MG/1
75 TABLET ORAL DAILY
Qty: 30 TABLET | Refills: 4 | Status: SHIPPED | OUTPATIENT
Start: 2019-01-04 | End: 2020-09-09

## 2019-01-04 NOTE — PATIENT INSTRUCTIONS
1. Please followup with PCP for modification of stroke risk factors.  2. Please followup with MRI Brain.  3. Please followup with physical therapy for left leg weakness.  4. Please take plavix 75mg once a day. Please notify me if you have any adverse reactions.

## 2019-01-07 ENCOUNTER — OFFICE VISIT (OUTPATIENT)
Dept: FAMILY MEDICINE | Facility: CLINIC | Age: 74
End: 2019-01-07
Payer: MEDICARE

## 2019-01-07 VITALS
BODY MASS INDEX: 34.19 KG/M2 | HEIGHT: 73 IN | OXYGEN SATURATION: 95 % | HEART RATE: 62 BPM | RESPIRATION RATE: 18 BRPM | WEIGHT: 258 LBS | SYSTOLIC BLOOD PRESSURE: 140 MMHG | DIASTOLIC BLOOD PRESSURE: 88 MMHG | TEMPERATURE: 98 F

## 2019-01-07 DIAGNOSIS — I25.5 ISCHEMIC CARDIOMYOPATHY: ICD-10-CM

## 2019-01-07 DIAGNOSIS — Z95.0 PACEMAKER: ICD-10-CM

## 2019-01-07 DIAGNOSIS — I25.10 CAD, MULTIPLE VESSEL: ICD-10-CM

## 2019-01-07 DIAGNOSIS — Z95.810 AICD (AUTOMATIC CARDIOVERTER/DEFIBRILLATOR) PRESENT: ICD-10-CM

## 2019-01-07 DIAGNOSIS — I63.9 CEREBROVASCULAR ACCIDENT (CVA), UNSPECIFIED MECHANISM: Primary | ICD-10-CM

## 2019-01-07 DIAGNOSIS — I10 ESSENTIAL HYPERTENSION: Chronic | ICD-10-CM

## 2019-01-07 PROCEDURE — 99999 PR PBB SHADOW E&M-EST. PATIENT-LVL IV: CPT | Mod: PBBFAC,,, | Performed by: INTERNAL MEDICINE

## 2019-01-07 PROCEDURE — 99214 OFFICE O/P EST MOD 30 MIN: CPT | Mod: S$PBB,,, | Performed by: INTERNAL MEDICINE

## 2019-01-07 PROCEDURE — 99214 OFFICE O/P EST MOD 30 MIN: CPT | Mod: PBBFAC,PN | Performed by: INTERNAL MEDICINE

## 2019-01-07 PROCEDURE — 99999 PR PBB SHADOW E&M-EST. PATIENT-LVL IV: ICD-10-PCS | Mod: PBBFAC,,, | Performed by: INTERNAL MEDICINE

## 2019-01-07 PROCEDURE — 99214 PR OFFICE/OUTPT VISIT, EST, LEVL IV, 30-39 MIN: ICD-10-PCS | Mod: S$PBB,,, | Performed by: INTERNAL MEDICINE

## 2019-01-07 NOTE — PATIENT INSTRUCTIONS
We have reviewed your prescription medications.  I recommend that you talk to Dr Avendano about whether or not you can do the MRI because of pacemaker and AICD. Please start taking the Plavix. It is recommended to prevent another stroke. Physical therapy will also be beneficial. Keep your appointment with Dr Avendano on Friday. I would like to see you in a month to follow-up on things.

## 2019-01-08 NOTE — PROGRESS NOTES
Neurology Clinic Visit  Primary Care Provider: JAMES Fuentes  Referring Provider: pepe keating  Date of Visit: 01/04/2019  Reason for visit - stroke     chief complaint:   Chief Complaint   Patient presents with    Stroke       History of present illness:  Nimesh Saini is a 73 y.o. right handed male I have been asked to consult for evaluation and treatment of possible stroke.  A December 25, 2018, the patient developed sudden onset of left-sided weakness and including his arm and leg as well as numbness.  He also had double vision.  He also noted he had slurred speech.  He reports that the double vision lasted for about 2-3 hours but the weakness and numbness persisted.  He was admitted to the hospital for stroke workup. CT head showed no acute findings.  Repeat CT head the next day did not show any acute findings.  He was discharged home with instructions to be on aspirin Plavix and Lipitor.  He reports since discharge his arm weakness has improved but he continued to have weakness in the left leg.  He is currently taking aspirin 81 mg daily.  He reports he did not get a prescription for Plavix.  He was on aspirin prior to his stroke due to cardiac reasons.  TTE showed EF 45%.      Patient Active Problem List    Diagnosis Date Noted    Pacemaker 01/07/2019    Stroke 12/25/2018    Moderate mitral regurgitation 03/08/2018    S/P right coronary artery (RCA) stent placement 03/08/2018    AICD (automatic cardioverter/defibrillator) present 02/09/2018    Pleural scarring 11/16/2017    Lung nodule 07/25/2017    History of asbestos exposure 07/12/2017    Ischemic cardiomyopathy 05/02/2017    CAD, multiple vessel 05/02/2017    Combined systolic and diastolic congestive heart failure, NYHA class 3 05/02/2017    Hiatal hernia with GERD 01/17/2017    Erectile dysfunction 09/12/2014    Obesity (BMI 30.0-34.9) 09/12/2014    Hypertension 08/14/2014     Past Medical History:   Diagnosis Date    Cardiomyopathy      ICM EF 15%    CHF (congestive heart failure)     chronic combined     Coronary artery disease     3 vessel    Diverticulitis large intestine 08/2014    GERD (gastroesophageal reflux disease)     Hypertension     Obesity      Past Surgical History:   Procedure Laterality Date    CARDIAC DEFIBRILLATOR PLACEMENT Left 09/2017    CORONARY ANGIOPLASTY WITH STENT PLACEMENT  06/2017    coronary angiogram; stents x 5    ESOPHAGOGASTRODUODENOSCOPY (EGD) N/A 3/27/2018    [facility]    ESOPHAGOGASTRODUODENOSCOPY (EGD) N/A 1/17/2017    [facility] END[facility]    HEART CATH-LEFT Right 6/6/2017    [facility] Jan [facility] at [facility] CATH LAB    HEART CATH-LEFT Right 5/2/2017    [facility] CATH LAB    HEART CATH-RIGHT Right 5/2/2017    [facility] CATH LAB    SIGMOIDOSCOPY-FLEXIBLE N/A 3/27/2018    [facility] END[facility]    STENT-CORONARY Right 6/6/2017    [facility] CATH LAB    UMBILICAL HERNIA REPAIR      1990's     Family History   Problem Relation Age of Onset    Diabetes Mother     Hypertension Father     Stroke Father     Asthma Neg Hx     Emphysema Neg Hx          Current Outpatient Medications   Medication Sig    albuterol (PROAIR HFA) 90 mcg/actuation inhaler Inhale 2 puffs into the lungs every 6 (six) hours as needed for Wheezing. Rescue    aspirin (ECOTRIN) 81 MG EC tablet Take 81 mg by mouth once daily.    atorvastatin (LIPITOR) 80 MG tablet Take 80 mg by mouth once daily.    furosemide (LASIX) 40 MG tablet Take 40 mg by mouth daily as needed.    guaifenesin-codeine 100-10 mg/5 ml (VIRTUSSIN AC)  mg/5 mL syrup Take 5 mLs by mouth 3 (three) times daily as needed for Cough.    metoprolol succinate (TOPROL-XL) 25 MG 24 hr tablet Take 12.5 mg by mouth once daily.    nitroGLYCERIN (NITROSTAT) 0.4  MG SL tablet DIS 1 T UNT Q FIVE MINUTES UP TO THREE DOSES PRF CHEST PAIN    pantoprazole (PROTONIX) 40 MG tablet Take 1 tablet (40 mg total) by mouth once daily.    sacubitril-valsartan (ENTRESTO) 24-26 mg per tablet Take 1 tablet by mouth 2 (two) times daily. 49-51 mg per tablet    spironolactone (ALDACTONE) 25 MG tablet Take 25 mg by mouth once daily.    clopidogrel (PLAVIX) 75 mg tablet Take 1 tablet (75 mg total) by mouth once daily.     No current facility-administered medications for this visit.        Review of patient's allergies indicates:  No Known Allergies  Social History     Socioeconomic History    Marital status:      Spouse name: Not on file    Number of children: Not on file    Years of education: Not on file    Highest education level: Not on file   Social Needs    Financial resource strain: Not on file    Food insecurity - worry: Not on file    Food insecurity - inability: Not on file    Transportation needs - medical: Not on file    Transportation needs - non-medical: Not on file   Occupational History    Not on file   Tobacco Use    Smoking status: Former Smoker     Packs/day: 0.25     Years: 10.00     Pack years: 2.50     Types: Cigarettes     Start date: 1963     Last attempt to quit: 1971     Years since quittin.0    Smokeless tobacco: Never Used   Substance and Sexual Activity    Alcohol use: No     Comment: socially    Drug use: No    Sexual activity: Not on file   Other Topics Concern    Not on file   Social History Narrative    Retired large . Lives alone. [employer]. [place] .         Review of Systems    Constitutional: negative  Eyes: negative  Ears, nose, mouth, throat, and face: negative  Respiratory: negative  Cardiovascular: negative  Gastrointestinal: negative  Genitourinary:negative  Integument/breast: negative  Hematologic/lymphatic: negative  Musculoskeletal:negative  Neurological:  "negative  Behavioral/Psych: negative  Endocrine: negative  Allergic/Immunologic: negative    Objective:  Vital signs in last 24 hours:    Vitals:    01/04/19 1443   BP: (!) 142/81   Pulse: 68   Weight: 117.8 kg (259 lb 11.2 oz)   Height: 6' 1" (1.854 m)     General: no acute distress, well nourished, well-groomed  CVS: RRR, no murmur, gallops or rubs  Respiratory: Clear to ausculation  Extremities: no edema    Neurological Examination:    HIGHER INTEGRATIVE FUNCTIONS:  -Normal attention span and concentration; immediately responds to questions and commands  -Oriented to time, place and person  -Recent and remote memory intact  -Language normal (no aphasia or dysarthria)  -Normal fund of knowledge    CN:  -PERRLA, visual fields full, unable to visualize optic discs due to small pupils on fundus exam  -EOMI with normal saccades and smooth pursuit  -Facial sensation intact bilaterally  -Facial strength/movement intact bilaterally  -Hearing intact to voice  -Palate elevates symmetrically  -Normal shoulder shrug and head turn  -Tongue protrudes midline    MOTOR: (left/right graded 1-5)  -UE: 5/5 deltoids; 5/5 biceps, triceps; 5/5 wrist flexors, extensors; 5/5 interosseous; 5/5   -LEs: 5/5 hip flexion, extension; 5/5 knee flexion, extension; 5/5 ankle flexion, extension on right but 4+/5 on left  -Tone: normal    SENSORY:  -Light touch, temperature sensation intact bilaterally    REFLEXES:  -2+ upper and lower bilaterally  -Flexor plantar reflex bilaterally  -No clonus    COORDINATION:  -FNF, HKS intact bilaterally    GAIT:  -Plegic gait    Imaging    CTA Head and neck    Impression       60% stenosis at the origin the right internal carotid artery.    High-grade narrowing at the origin of the right vertebral artery.    Occlusion of the distal vertebral arteries bilaterally with reconstitution of flow at the level of the basilar artery.    Preliminary disagree.         CT Head:  FINDINGS:  The brain has a normal " appearance.  The ventricular system is within normal limits of size for age and shows no distortion by mass effect.  There is no intra or extra-axial mass or hemorrhage.  The visualized extracranial structures are significant for a punctate metallic foreign body within the subcutaneous tissues of the left frontal region.      Impression       No acute intracranial abnormality.           Assessment/Plan:  1. Left sided weakness, likely secondary to ischemic stroke; etiology unclear as workup as not been complete  2. HTN, per PCP  3. CAD s/p AICD  4. HLD  5. R ICA stenosis at origin, 60%    Plan:  Obtain MRI Brain  Referral to physical therapy for left sided weakness  Dual antiplatelets for 21 days (will add plavix; then will continue with plavix only if ok with cardiologist)   I asked the patient to follow-up with his cardiologist.  Possible referral to vascular surgery depending on MRI results    I discussed assessment and plan with the patient and answered the questions that he had.
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
## 2019-01-10 ENCOUNTER — HOSPITAL ENCOUNTER (OUTPATIENT)
Dept: RADIOLOGY | Facility: HOSPITAL | Age: 74
Discharge: HOME | End: 2019-01-10
Attending: PSYCHIATRY & NEUROLOGY
Payer: MEDICARE

## 2019-01-10 DIAGNOSIS — I63.9 CEREBROVASCULAR ACCIDENT (CVA), UNSPECIFIED MECHANISM: ICD-10-CM

## 2019-01-25 ENCOUNTER — TELEPHONE (OUTPATIENT)
Dept: NEUROLOGY | Facility: CLINIC | Age: 74
End: 2019-01-25

## 2019-01-28 ENCOUNTER — OFFICE VISIT (OUTPATIENT)
Dept: FAMILY MEDICINE | Facility: CLINIC | Age: 74
End: 2019-01-28
Payer: MEDICARE

## 2019-01-28 VITALS
WEIGHT: 262.5 LBS | HEART RATE: 64 BPM | BODY MASS INDEX: 34.79 KG/M2 | HEIGHT: 73 IN | TEMPERATURE: 99 F | OXYGEN SATURATION: 94 % | SYSTOLIC BLOOD PRESSURE: 138 MMHG | DIASTOLIC BLOOD PRESSURE: 90 MMHG | RESPIRATION RATE: 18 BRPM

## 2019-01-28 DIAGNOSIS — Z95.0 PACEMAKER: ICD-10-CM

## 2019-01-28 DIAGNOSIS — I10 ESSENTIAL HYPERTENSION: Chronic | ICD-10-CM

## 2019-01-28 DIAGNOSIS — I63.9 CEREBROVASCULAR ACCIDENT (CVA), UNSPECIFIED MECHANISM: Primary | ICD-10-CM

## 2019-01-28 DIAGNOSIS — I50.42 CHRONIC COMBINED SYSTOLIC AND DIASTOLIC CONGESTIVE HEART FAILURE, NYHA CLASS 3: Chronic | ICD-10-CM

## 2019-01-28 DIAGNOSIS — I25.10 CAD, MULTIPLE VESSEL: ICD-10-CM

## 2019-01-28 DIAGNOSIS — D69.6 THROMBOCYTOPENIA: ICD-10-CM

## 2019-01-28 PROCEDURE — 99214 OFFICE O/P EST MOD 30 MIN: CPT | Mod: PBBFAC,PN | Performed by: INTERNAL MEDICINE

## 2019-01-28 PROCEDURE — 99213 OFFICE O/P EST LOW 20 MIN: CPT | Mod: S$PBB,,, | Performed by: INTERNAL MEDICINE

## 2019-01-28 PROCEDURE — 99999 PR PBB SHADOW E&M-EST. PATIENT-LVL IV: ICD-10-PCS | Mod: PBBFAC,,, | Performed by: INTERNAL MEDICINE

## 2019-01-28 PROCEDURE — 99999 PR PBB SHADOW E&M-EST. PATIENT-LVL IV: CPT | Mod: PBBFAC,,, | Performed by: INTERNAL MEDICINE

## 2019-01-28 PROCEDURE — 99213 PR OFFICE/OUTPT VISIT, EST, LEVL III, 20-29 MIN: ICD-10-PCS | Mod: S$PBB,,, | Performed by: INTERNAL MEDICINE

## 2019-01-28 NOTE — PATIENT INSTRUCTIONS
We have reviewed your prescription medications. I would like to recheck your blood counts because platelets were a little low in December. I hope everything goes well for MRI tomorrow. Let me know if you need refills. I would like to recheck you in 3 months.

## 2019-01-28 NOTE — PROGRESS NOTES
Subjective:       Patient ID: Nimesh Saini is a 73 y.o. male.    Chief Complaint: Follow-up     I have reviewed the PM,  and  for this patient. He  has a past medical history of Cardiomyopathy, CHF (congestive heart failure), Coronary artery disease, Diverticulitis large intestine (08/2014), GERD (gastroesophageal reflux disease), Hypertension, and Obesity.   He is here today for follow-up of her recent stroke.  The neurologist has requested an MRI, but he has been unable to get this done so far because of his pacemaker.  He is scheduled to have it done tomorrow.  He has been feeling much better since he has been doing physical therapy.  He has no trouble swallowing.  He does have facial weakness on the left.  He was concerned about taking the Plavix after his stroke so he discussed it with Dr. Avendano.  He reports that Dr. Avendano agrees with me that he should take the Plavix and also take his aspirin.  His blood pressure today is 138/90.  He has [facility] insurance and he is going to start getting his medications there because he is service connected.  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  His last lab work showed a slightly low platelet count of 124.  This could be  Made worse by Plavix, so I would like to repeat his CBC.      Active Ambulatory Problems     Diagnosis Date Noted    Hypertension 08/14/2014    Erectile dysfunction 09/12/2014    Obesity (BMI 30.0-34.9) 09/12/2014    Hiatal hernia with GERD 01/17/2017    Ischemic cardiomyopathy 05/02/2017    CAD, multiple vessel 05/02/2017    Combined systolic and diastolic congestive heart failure, NYHA class 3 05/02/2017    History of asbestos exposure 07/12/2017    Lung nodule 07/25/2017    Pleural scarring 11/16/2017    AICD (automatic cardioverter/defibrillator) present 02/09/2018    Moderate mitral regurgitation 03/08/2018    S/P right coronary artery (RCA) stent placement 03/08/2018    Stroke 12/25/2018     Pacemaker 01/07/2019    Thrombocytopenia 01/28/2019    BMI 34.0-34.9,adult 01/28/2019     Resolved Ambulatory Problems     Diagnosis Date Noted    Diverticulitis large intestine 08/14/2014    Epigastric abdominal pain 01/12/2017    Shortness of breath 05/02/2017    Chest pain, atypical 07/12/2017    Pleural effusion 07/12/2017    Dysphagia 03/27/2018    Numbness     Ataxia      Past Medical History:   Diagnosis Date    Cardiomyopathy     CHF (congestive heart failure)     Coronary artery disease     Diverticulitis large intestine 08/2014    GERD (gastroesophageal reflux disease)     Hypertension     Obesity        HEALTH MAINTENANCE:   Health Maintenance   Topic Date Due    TETANUS VACCINE  10/02/1964    Zoster Vaccine  10/02/2006    Pneumococcal Vaccine (65+ Low/Medium Risk) (2 of 2 - PPSV23) 01/11/2020    Lipid Panel  12/25/2023    Colonoscopy  10/01/2025    Influenza Vaccine  Completed    Abdominal Aortic Aneurysm Screening  Completed    Hepatitis C Screening  Addressed       Review of Systems   Constitutional: Negative for chills, fatigue and fever.   HENT: Negative for congestion, ear discharge, ear pain, rhinorrhea and sore throat.    Eyes: Negative for discharge, redness and itching.   Respiratory: Negative for cough, chest tightness, shortness of breath and wheezing.    Cardiovascular: Negative for chest pain, palpitations and leg swelling.   Gastrointestinal: Negative for abdominal pain, constipation, diarrhea, nausea and vomiting.   Endocrine: Negative for cold intolerance and heat intolerance.   Genitourinary: Negative for dysuria, flank pain, frequency and hematuria.   Musculoskeletal: Negative for arthralgias, back pain, joint swelling and myalgias.   Skin: Negative for color change and rash.   Neurological: Positive for weakness and numbness. Negative for dizziness, tremors and headaches.   Psychiatric/Behavioral: Negative for dysphoric mood and sleep disturbance. The patient  is not nervous/anxious.        Objective:          LABS    CMP  Sodium   Date Value Ref Range Status   12/26/2018 139 136 - 145 mmol/L Final     Potassium   Date Value Ref Range Status   12/26/2018 4.0 3.5 - 5.1 mmol/L Final     Chloride   Date Value Ref Range Status   12/26/2018 104 95 - 110 mmol/L Final     CO2   Date Value Ref Range Status   12/26/2018 26 23 - 29 mmol/L Final     Glucose   Date Value Ref Range Status   12/26/2018 126 (H) 70 - 110 mg/dL Final     BUN, Bld   Date Value Ref Range Status   12/26/2018 23 (H) 2 - 20 mg/dL Final     Creatinine   Date Value Ref Range Status   12/26/2018 1.00 0.50 - 1.40 mg/dL Final     Calcium   Date Value Ref Range Status   12/26/2018 9.2 8.7 - 10.5 mg/dL Final     Total Protein   Date Value Ref Range Status   12/25/2018 7.5 6.0 - 8.4 g/dL Final     Albumin   Date Value Ref Range Status   12/25/2018 4.4 3.5 - 5.2 g/dL Final     Total Bilirubin   Date Value Ref Range Status   12/25/2018 1.5 (H) 0.1 - 1.0 mg/dL Final     Comment:     For infants and newborns, interpretation of results should be based  on gestational age, weight and in agreement with clinical  observations.  Premature Infant recommended reference ranges:  Up to 24 hours.............<8.0 mg/dL  Up to 48 hours............<12.0 mg/dL  3-5 days..................<15.0 mg/dL  6-29 days.................<15.0 mg/dL       Alkaline Phosphatase   Date Value Ref Range Status   12/25/2018 79 38 - 126 U/L Final     AST   Date Value Ref Range Status   12/25/2018 20 15 - 46 U/L Final     ALT   Date Value Ref Range Status   12/25/2018 19 10 - 44 U/L Final     Anion Gap   Date Value Ref Range Status   12/26/2018 9 8 - 16 mmol/L Final     eGFR if    Date Value Ref Range Status   12/26/2018 >60.0 >60 mL/min/1.73 m^2 Final     eGFR if non    Date Value Ref Range Status   12/26/2018 >60.0 >60 mL/min/1.73 m^2 Final     Comment:     Calculation used to obtain the estimated glomerular  filtration  rate (eGFR) is the CKD-EPI equation.          Lab Results   Component Value Date    WBC 5.87 01/28/2019    HGB 15.3 01/28/2019    HCT 44.4 01/28/2019    MCV 93 01/28/2019     (L) 01/28/2019       Recent Labs   Lab Result Units 12/25/18  1739   TSH uIU/mL 1.810   HDL mg/dL 62   Cholesterol mg/dL 161   Triglycerides mg/dL 100   LDL Cholesterol mg/dL 79.0   HDL/Chol Ratio % 38.5   Non-HDL Cholesterol mg/dL 99   Total Cholesterol/HDL Ratio  2.6         A1C:  Recent Labs   Lab Result Units 12/26/18  0544   Hemoglobin A1C % 4.9         Physical Exam   Constitutional: He is oriented to person, place, and time. He appears well-developed and well-nourished.   HENT:   Head: Normocephalic and atraumatic.   Right Ear: External ear normal. Tympanic membrane is not erythematous. No middle ear effusion.   Left Ear: External ear normal. Tympanic membrane is not erythematous.  No middle ear effusion.   Mouth/Throat: No posterior oropharyngeal edema or posterior oropharyngeal erythema. No tonsillar exudate.   Eyes: Conjunctivae and EOM are normal. Pupils are equal, round, and reactive to light.   Neck: No thyromegaly present.   Cardiovascular: Normal rate, regular rhythm, normal heart sounds and intact distal pulses.   No murmur heard.  Pulmonary/Chest: Effort normal and breath sounds normal. He has no wheezes.   Abdominal: He exhibits no distension. There is no tenderness.   Musculoskeletal: He exhibits no edema or tenderness.   Lymphadenopathy:     He has no cervical adenopathy.   Neurological: He is alert and oriented to person, place, and time. He displays normal reflexes. No cranial nerve deficit.   He has weakness on the left.    Skin: Skin is warm and dry. No rash noted.   Psychiatric: He has a normal mood and affect. His behavior is normal.             Assessment and Plan:     Problem List Items Addressed This Visit        Neuro    Stroke - Primary - continue plavix and aspirin. He is scheduled for MRI  tomorrow. Continue PT. I will recheck cbc because of plavix.        Cardiac/Vascular    Hypertension (Chronic) - BP is ok. Stable.       Combined systolic and diastolic congestive heart failure, NYHA class 3 (Chronic) - stable. Folow-up with Dr Avendano.       CAD, multiple vessel - stable. folow-up with Dr Avendano.       Pacemaker -stable. folow-up with dr Avendano.        Hematology    Thrombocytopenia - let's recheck this because of starting plavix.     Relevant Orders    CBC auto differential (Completed)       Endocrine    BMI 34.0-34.9,adult - working on diet.           Orders Placed This Encounter    CBC auto differential         Follow-up with me in 3 months.

## 2019-01-29 ENCOUNTER — HOSPITAL ENCOUNTER (OUTPATIENT)
Dept: RADIOLOGY | Facility: HOSPITAL | Age: 74
Discharge: HOME | End: 2019-01-29
Attending: PSYCHIATRY & NEUROLOGY
Payer: MEDICARE

## 2019-01-29 ENCOUNTER — TELEPHONE (OUTPATIENT)
Dept: FAMILY MEDICINE | Facility: CLINIC | Age: 74
End: 2019-01-29

## 2019-01-29 ENCOUNTER — TELEPHONE (OUTPATIENT)
Dept: NEUROLOGY | Facility: CLINIC | Age: 74
End: 2019-01-29

## 2019-01-29 DIAGNOSIS — Z95.818 PRESENCE OF CARDIAC DEVICE: ICD-10-CM

## 2019-01-29 DIAGNOSIS — Z95.818 PRESENCE OF CARDIAC DEVICE: Primary | ICD-10-CM

## 2019-01-29 PROCEDURE — 71046 XR CHEST PA AND LATERAL: ICD-10-PCS | Mod: 26,,, | Performed by: RADIOLOGY

## 2019-01-29 PROCEDURE — 71046 X-RAY EXAM CHEST 2 VIEWS: CPT | Mod: 26,,, | Performed by: RADIOLOGY

## 2019-01-29 PROCEDURE — 70551 MRI BRAIN STEM W/O DYE: CPT | Mod: 26,,, | Performed by: RADIOLOGY

## 2019-01-29 PROCEDURE — 71046 X-RAY EXAM CHEST 2 VIEWS: CPT | Mod: TC,FY

## 2019-01-29 PROCEDURE — 70551 MRI BRAIN STEM W/O DYE: CPT | Mod: TC

## 2019-01-29 PROCEDURE — 70551 MRI BRAIN WITHOUT CONTRAST: ICD-10-PCS | Mod: 26,,, | Performed by: RADIOLOGY

## 2019-01-29 NOTE — TELEPHONE ENCOUNTER
Called pt to inform completed blood work. Platelet count is stable and pt is to continue Plavix and Aspirin, per Dr Markham. Pt verbalizes understanding.

## 2019-01-29 NOTE — TELEPHONE ENCOUNTER
----- Message from Karen Markham MD sent at 1/28/2019  4:51 PM CST -----  Platelet count is stable. Continue plavix and aspirin.

## 2019-02-12 ENCOUNTER — TELEPHONE (OUTPATIENT)
Dept: NEUROSURGERY | Facility: CLINIC | Age: 74
End: 2019-02-12

## 2019-02-12 NOTE — TELEPHONE ENCOUNTER
----- Message from Hawk White MD sent at 2/12/2019 12:48 PM CST -----  Please schedule a follow-up appointment.    Thank you    [facility]

## 2019-02-12 NOTE — TELEPHONE ENCOUNTER
I called pt to schedule a f/u appt. With Dr. White. There was no answer and I left a message for a return call.

## 2019-02-19 ENCOUNTER — OFFICE VISIT (OUTPATIENT)
Dept: NEUROLOGY | Facility: CLINIC | Age: 74
End: 2019-02-19
Payer: MEDICARE

## 2019-02-19 VITALS
HEIGHT: 73 IN | BODY MASS INDEX: 34.85 KG/M2 | WEIGHT: 263 LBS | SYSTOLIC BLOOD PRESSURE: 150 MMHG | DIASTOLIC BLOOD PRESSURE: 85 MMHG | HEART RATE: 62 BPM

## 2019-02-19 DIAGNOSIS — I69.30 HISTORY OF CVA WITH RESIDUAL DEFICIT: ICD-10-CM

## 2019-02-19 DIAGNOSIS — I65.21 MORE THAN 50 PERCENT STENOSIS OF RIGHT INTERNAL CAROTID ARTERY: Primary | ICD-10-CM

## 2019-02-19 DIAGNOSIS — I10 HYPERTENSION, UNSPECIFIED TYPE: ICD-10-CM

## 2019-02-19 DIAGNOSIS — R29.898 LEFT LEG WEAKNESS: ICD-10-CM

## 2019-02-19 DIAGNOSIS — I50.9 CONGESTIVE HEART FAILURE, UNSPECIFIED HF CHRONICITY, UNSPECIFIED HEART FAILURE TYPE: ICD-10-CM

## 2019-02-19 PROCEDURE — 99999 PR PBB SHADOW E&M-EST. PATIENT-LVL IV: ICD-10-PCS | Mod: PBBFAC,,, | Performed by: PSYCHIATRY & NEUROLOGY

## 2019-02-19 PROCEDURE — 99214 OFFICE O/P EST MOD 30 MIN: CPT | Mod: S$PBB,,, | Performed by: PSYCHIATRY & NEUROLOGY

## 2019-02-19 PROCEDURE — 99214 OFFICE O/P EST MOD 30 MIN: CPT | Mod: PBBFAC,PO | Performed by: PSYCHIATRY & NEUROLOGY

## 2019-02-19 PROCEDURE — 99999 PR PBB SHADOW E&M-EST. PATIENT-LVL IV: CPT | Mod: PBBFAC,,, | Performed by: PSYCHIATRY & NEUROLOGY

## 2019-02-19 PROCEDURE — 99214 PR OFFICE/OUTPT VISIT, EST, LEVL IV, 30-39 MIN: ICD-10-PCS | Mod: S$PBB,,, | Performed by: PSYCHIATRY & NEUROLOGY

## 2019-02-19 NOTE — PATIENT INSTRUCTIONS
Please followup with vascular surgery for right internal carotid artery stenosis.  Please followup with PCP for continue modification of stroke risk factors.

## 2019-02-20 ENCOUNTER — TELEPHONE (OUTPATIENT)
Dept: VASCULAR SURGERY | Facility: CLINIC | Age: 74
End: 2019-02-20

## 2019-02-20 DIAGNOSIS — Z95.5 S/P RIGHT CORONARY ARTERY (RCA) STENT PLACEMENT: ICD-10-CM

## 2019-02-20 DIAGNOSIS — I65.21 STENOSIS OF RIGHT CAROTID ARTERY: Primary | ICD-10-CM

## 2019-02-20 NOTE — TELEPHONE ENCOUNTER
02/20/0219                    1635  Attempted to contact patient regarding his appointment with Dr. Farah on tomorrow. No answer. Left voicemail.

## 2019-02-21 ENCOUNTER — OFFICE VISIT (OUTPATIENT)
Dept: VASCULAR SURGERY | Facility: CLINIC | Age: 74
End: 2019-02-21
Payer: MEDICARE

## 2019-02-21 VITALS
HEIGHT: 73 IN | WEIGHT: 262.56 LBS | DIASTOLIC BLOOD PRESSURE: 90 MMHG | SYSTOLIC BLOOD PRESSURE: 153 MMHG | BODY MASS INDEX: 34.8 KG/M2 | TEMPERATURE: 98 F | HEART RATE: 64 BPM

## 2019-02-21 DIAGNOSIS — I63.9 CEREBROVASCULAR ACCIDENT (CVA), UNSPECIFIED MECHANISM: Primary | ICD-10-CM

## 2019-02-21 DIAGNOSIS — Z95.810 AICD (AUTOMATIC CARDIOVERTER/DEFIBRILLATOR) PRESENT: ICD-10-CM

## 2019-02-21 DIAGNOSIS — I50.42 CHRONIC COMBINED SYSTOLIC AND DIASTOLIC CONGESTIVE HEART FAILURE, NYHA CLASS 3: Chronic | ICD-10-CM

## 2019-02-21 PROCEDURE — 99204 PR OFFICE/OUTPT VISIT, NEW, LEVL IV, 45-59 MIN: ICD-10-PCS | Mod: S$PBB,,, | Performed by: SURGERY

## 2019-02-21 PROCEDURE — 99213 OFFICE O/P EST LOW 20 MIN: CPT | Mod: PBBFAC,PO | Performed by: SURGERY

## 2019-02-21 PROCEDURE — 99999 PR PBB SHADOW E&M-EST. PATIENT-LVL III: ICD-10-PCS | Mod: PBBFAC,,, | Performed by: SURGERY

## 2019-02-21 PROCEDURE — 99204 OFFICE O/P NEW MOD 45 MIN: CPT | Mod: S$PBB,,, | Performed by: SURGERY

## 2019-02-21 PROCEDURE — 99999 PR PBB SHADOW E&M-EST. PATIENT-LVL III: CPT | Mod: PBBFAC,,, | Performed by: SURGERY

## 2019-02-21 NOTE — PROGRESS NOTES
Neurology Clinic Visit  Primary Care Provider: APPLE Markham  Referring Provider: [unfilled]  Date of Visit: 02/19/2019  Reason for visit - followup     chief complaint:   Chief Complaint   Patient presents with    Follow-up       Interval history  Patient presents for follow-up. He had MRI Brain which revealed right corona radiata ischemic stroke. He has been seeing physical therapy and he reports his strength on the left sided is almost back to normal except for subtle weakness in left leg. He reports he saw his cardiologist and PCP who stated to continue both asa and plavix.  Patient has HTN and HLD which is managed by PCP and CAD managed by cardiology. He is currently on asa/plavix and statin for secondary stroke prevention.      History of present illness on 1/4/2019:  Nimesh Saini is a 73 y.o. right handed male I have been asked to consult for evaluation and treatment of possible stroke.  A December 25, 2018, the patient developed sudden onset of left-sided weakness and including his arm and leg as well as numbness.  He also had double vision.  He also noted he had slurred speech.  He reports that the double vision lasted for about 2-3 hours but the weakness and numbness persisted.  He was admitted to the hospital for stroke workup. CT head showed no acute findings.  Repeat CT head the next day did not show any acute findings.  He was discharged home with instructions to be on aspirin Plavix and Lipitor.  He reports since discharge his arm weakness has improved but he continued to have weakness in the left leg.  He is currently taking aspirin 81 mg daily.  He reports he did not get a prescription for Plavix.  He was on aspirin prior to his stroke due to cardiac reasons.  TTE showed EF 45%.       Patient Active Problem List    Diagnosis Date Noted    Thrombocytopenia 01/28/2019    BMI 34.0-34.9,adult 01/28/2019    Pacemaker 01/07/2019    Stroke 12/25/2018    Moderate mitral regurgitation 03/08/2018     S/P right coronary artery (RCA) stent placement 03/08/2018    AICD (automatic cardioverter/defibrillator) present 02/09/2018    Pleural scarring 11/16/2017    Lung nodule 07/25/2017    History of asbestos exposure 07/12/2017    Ischemic cardiomyopathy 05/02/2017    CAD, multiple vessel 05/02/2017    Combined systolic and diastolic congestive heart failure, NYHA class 3 05/02/2017    Hiatal hernia with GERD 01/17/2017    Erectile dysfunction 09/12/2014    Obesity (BMI 30.0-34.9) 09/12/2014    Hypertension 08/14/2014     Past Medical History:   Diagnosis Date    Cardiomyopathy     ICM EF 15%    CHF (congestive heart failure)     chronic combined     Coronary artery disease     3 vessel    Diverticulitis large intestine 08/2014    GERD (gastroesophageal reflux disease)     Hypertension     Obesity      Past Surgical History:   Procedure Laterality Date    CARDIAC DEFIBRILLATOR PLACEMENT Left 09/2017    CORONARY ANGIOPLASTY WITH STENT PLACEMENT  06/2017    coronary angiogram; stents x 5    ESOPHAGOGASTRODUODENOSCOPY (EGD) N/A 3/27/2018    [facility]    ESOPHAGOGASTRODUODENOSCOPY (EGD) N/A 1/17/2017    [facility] at [facility]    HEART CATH-LEFT Right 6/6/2017    [facility]    HEART CATH-LEFT Right 5/2/2017    [facility]    HEART CATH-RIGHT Right 5/2/2017    [facility]    SIGMOIDOSCOPY-FLEXIBLE N/A 3/27/2018    [facility]    STENT-CORONARY Right 6/6/2017    [facility] CATH [facility]    UMBILICAL HERNIA REPAIR      1990's     Family History   Problem Relation Age of Onset    Diabetes Mother     Hypertension Father     Stroke Father     Asthma Neg Hx     Emphysema Neg Hx          Current Outpatient Medications   Medication Sig    aspirin  (ECOTRIN) 81 MG EC tablet Take 81 mg by mouth once daily.    atorvastatin (LIPITOR) 80 MG tablet Take 80 mg by mouth once daily.    clopidogrel (PLAVIX) 75 mg tablet Take 1 tablet (75 mg total) by mouth once daily.    metoprolol succinate (TOPROL-XL) 25 MG 24 hr tablet Take 12.5 mg by mouth once daily.    nitroGLYCERIN (NITROSTAT) 0.4 MG SL tablet DIS 1 T UNT Q FIVE MINUTES UP TO THREE DOSES PRF CHEST PAIN    pantoprazole (PROTONIX) 40 MG tablet Take 1 tablet (40 mg total) by mouth once daily.    sacubitril-valsartan (ENTRESTO) 24-26 mg per tablet Take 1 tablet by mouth 2 (two) times daily. 49-51 mg per tablet    spironolactone (ALDACTONE) 25 MG tablet Take 25 mg by mouth once daily.     No current facility-administered medications for this visit.      Review of patient's allergies indicates:  No Known Allergies  Social History     Socioeconomic History    Marital status:      Spouse name: Not on file    Number of children: Not on file    Years of education: Not on file    Highest education level: Not on file   Social Needs    Financial resource strain: Not on file    Food insecurity - worry: Not on file    Food insecurity - inability: Not on file    Transportation needs - medical: Not on file    Transportation needs - non-medical: Not on file   Occupational History    Not on file   Tobacco Use    Smoking status: Former Smoker     Packs/day: 0.25     Years: 10.00     Pack years: 2.50     Types: Cigarettes     Start date: 1963     Last attempt to quit: 1971     Years since quittin.1    Smokeless tobacco: Never Used   Substance and Sexual Activity    Alcohol use: No     Comment: socially    Drug use: No    Sexual activity: Not on file   Other Topics Concern    Not on file   Social History Narrative    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         Review of Systems  Constitutional: negative  Eyes: negative  Ears,  "nose, mouth, throat, and face: negative  Respiratory: negative  Cardiovascular: negative  Gastrointestinal: negative  Genitourinary:negative  Integument/breast: negative  Hematologic/lymphatic: negative  Musculoskeletal:negative  Neurological: negative  Behavioral/Psych: negative  Endocrine: negative  Allergic/Immunologic: negative    Objective:  Vital signs in last 24 hours:    Vitals:    02/19/19 0849   BP: (!) 150/85   Pulse: 62   Weight: 119.3 kg (263 lb 0.1 oz)   Height: 6' 1" (1.854 m)     General: no acute distress, well nourished, well-groomed  CVS: RRR, no murmur, gallops or rubs  Respiratory: Clear to ausculation  Extremities: no edema     Neurological Examination:     HIGHER INTEGRATIVE FUNCTIONS:  -Normal attention span and concentration; immediately responds to questions and commands  -Oriented to time, place and person  -Recent and remote memory intact  -Language normal (no aphasia or dysarthria)  -Normal fund of knowledge     CN:  -PERRLA, visual fields full, unable to visualize optic discs due to small pupils on fundus exam  -EOMI with normal saccades and smooth pursuit  -Facial sensation intact bilaterally  -Facial strength/movement intact bilaterally  -Hearing intact to voice  -Palate elevates symmetrically  -Normal shoulder shrug and head turn  -Tongue protrudes midline     MOTOR: (left/right graded 1-5)  -UE: 5/5 deltoids; 5/5 biceps, triceps; 5/5 wrist flexors, extensors; 5/5 interosseous; 5/5   -LEs: 5/5 hip flexion, extension; 5/5 knee flexion, extension; 5/5 ankle flexion, extension on right but 4+/5 on left  -Tone: normal     SENSORY:  -Light touch, temperature sensation intact bilaterally     REFLEXES:  -2+ upper and lower bilaterally  -Flexor plantar reflex bilaterally  -No clonus     COORDINATION:  -FNF, HKS intact bilaterally     GAIT:  -Plegic gait    Imaging    CTA Head and neck     Impression         60% stenosis at the origin the right internal carotid artery.    High-grade " narrowing at the origin of the right vertebral artery.    Occlusion of the distal vertebral arteries bilaterally with reconstitution of flow at the level of the basilar artery.    Preliminary disagree.            CT Head:       FINDINGS:  The brain has a normal appearance.  The ventricular system is within normal limits of size for age and shows no distortion by mass effect.  There is no intra or extra-axial mass or hemorrhage.  The visualized extracranial structures are significant for a punctate metallic foreign body within the subcutaneous tissues of the left frontal region.       Impression         No acute intracranial abnormality.       MRI Brain:  Impression       Small infarct in the right corona radiata with imaging characteristics suggesting that this is subacute to chronic but not definitely present on CT of 18. Multiple small remote infarcts are also noted.  No evidence of acute infarct or intracranial hemorrhage.             Assessment/Plan:  1. Right corona ischemic stroke with residual left sided weakness: etiology  or artery to artery embolism  2. HTN, per PCP  3. CAD s/p AICD  4. HLD on statin  5. R ICA stenosis at origin, 60%  6. Left leg weakness secondary to stroke     Plan:  Patient is currently on asa and plavix for secondary stroke prevention. From a stroke standpoint, he only needs monotherapy. I discussed with patient that he can continue only with plavix if ok from a cardiac standpoint.  Continue physical therapy for left sided weakness  I asked the patient to follow-up with his cardiologist.  Referral to vascular surgery for Right ICA stenosis.  Follow-up with PCP for continue modification of stroke risk factors.      I discussed assessment and plan with the patient and answered the questions that he had.     Patient note was created using Dragon Dictation.  Any errors in syntax or even information may not have been identified and edited on initial review prior to signing this  note.

## 2019-02-21 NOTE — PROGRESS NOTES
Patient ID: Nimesh Saini is a 73 y.o. male.    I. HISTORY     Chief Complaint: No chief complaint on file.      HPI Nimesh Saini is a 73 y.o. male referred from Dr. Hawk White for evaluation and management of right carotid stenosis. He was admitted to hospital Dec 25 with acute left sided weakness and diagnosed with an acute right hemispheric stroke. No acute abnormalities noted on CT head. Right corona radiata infarct seen on MRI. He feels he is almost back to baseline but feels overall weak from lack of activity the past few weeks.    Has a farm and raises cattle and chickens. No chest pain or SOB with his work.    ?prior MI - PCI x 5. AICD in place. EF 45%  No LE claudication, rest pain or non-healing wounds.    Non-smoker. Quit 30-40 years ago.    No fam Hx AAA.  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 Negative for chills, decreased appetite and fever.   Eyes: Negative.    Cardiovascular: Negative for claudication and leg swelling.   Respiratory: Negative for cough and shortness of breath.    Endocrine: Negative.    Hematologic/Lymphatic: Negative for adenopathy and bleeding problem. Does not bruise/bleed easily.   Skin: Negative for color change, nail changes and poor wound healing.   Musculoskeletal: Positive for arthritis and joint pain.   Gastrointestinal: Positive for abdominal pain. Negative for nausea and vomiting.   Neurological: Negative for focal weakness, headaches and light-headedness.   Psychiatric/Behavioral: Positive for memory loss.   Allergic/Immunologic: Negative.        II. PHYSICAL EXAM   Right Arm BP - Sittin/85 (19 0935)  Left Arm BP - Sittin/90 (19 0935)  Pulse: 64  Temp: 98.2 °F (36.8 °C)  Body mass index is 34.64 kg/m².      Physical Exam   Constitutional: He is oriented to person, place, and time. He appears well-developed and well-nourished.  No distress.   HENT:   Head: Normocephalic and atraumatic.   Eyes: Conjunctivae and EOM are normal.   Cardiovascular: Normal rate and intact distal pulses.   Pulmonary/Chest: Effort normal. No respiratory distress.   Abdominal: Soft. He exhibits no distension. There is no tenderness.   Musculoskeletal: Normal range of motion. He exhibits no edema.   Neurological: He is alert and oriented to person, place, and time.   Skin: Skin is warm.   Psychiatric: He has a normal mood and affect.   Vitals reviewed.      III. ASSESSMENT & PLAN (MEDICAL DECISION MAKING)     1. Cerebrovascular accident (CVA), unspecified mechanism    2. Chronic combined systolic and diastolic congestive heart failure, NYHA class 3    3. AICD (automatic cardioverter/defibrillator) present        Imaging Results:  Carotid Duplex: ordered  R L          CTA head and neck - reviewed by me. <50% stenosis of right ICA by NASCET criteria    Assessment/Diagnosis and Plan:    Nimesh Saini is a 73 y.o. male with right hemispheric stroke in Dec 2018. He is recovering and almost back to baseline.  I think his ICA stenosis is <50% when using NASCET criteria to measure (residual lumen diam/normal distal ICA diam).    I have ordered a carotid duplex to further evaluate the stenosis. Consider right CEA if >50%.    Aleena Farah MD FACS [facility]  Vascular & Endovascular Surgery

## 2019-02-21 NOTE — LETTER
February 21, 2019      Hawk White MD  [address]  [facility] [address]           [place] - Vascular Surgery  [address]  [facility] [address]  Phone: [phone]  Fax: [phone]          Patient: Nimesh Saini   MR Number: [id number]   YOB: 1945   Date of Visit: 2/21/2019       Dear Dr. Hawk White:    Thank you for referring Nimesh Saini to me for evaluation. Attached you will find relevant portions of my assessment and plan of care.    If you have questions, please do not hesitate to call me. I look forward to following Nimesh Saini along with you.    Sincerely,    Aleena Farah MD    Enclosure  CC:  No Recipients    If you would like to receive this communication electronically, please contact [email or web address] or [phone] to request more information on [employer] Link access.    For providers and/or their staff who would like to refer a patient to [facility], please contact us through our one-stop-shop provider referral line, [facility], at [phone].    If you feel you have received this communication in error or would no longer like to receive these types of communications, please e-mail [email or web address]

## 2019-02-22 ENCOUNTER — HOSPITAL ENCOUNTER (OUTPATIENT)
Dept: RADIOLOGY | Facility: HOSPITAL | Age: 74
Discharge: HOME | End: 2019-02-22
Attending: SURGERY
Payer: MEDICARE

## 2019-02-22 DIAGNOSIS — I65.21 STENOSIS OF RIGHT CAROTID ARTERY: ICD-10-CM

## 2019-02-22 PROCEDURE — 93880 EXTRACRANIAL BILAT STUDY: CPT | Mod: 26,,, | Performed by: RADIOLOGY

## 2019-02-22 PROCEDURE — 93880 US CAROTID BILATERAL: ICD-10-PCS | Mod: 26,,, | Performed by: RADIOLOGY

## 2019-02-22 PROCEDURE — 93880 EXTRACRANIAL BILAT STUDY: CPT | Mod: TC

## 2019-02-26 ENCOUNTER — DOCUMENTATION ONLY (OUTPATIENT)
Dept: VASCULAR SURGERY | Facility: HOSPITAL | Age: 74
End: 2019-02-26

## 2019-02-26 NOTE — PROGRESS NOTES
Carotid duplex reviewed and agree with read of <50% ICA stenosis bilaterally.    No role for right carotid revascularization given <50% stenosis. Continue aggressive risk factor modification and 1 year follow up with repeat carotid duplex.    I discussed the findings and plan with Mr Yeny who seems to understand.    Aleena Farah MD FACS [facility]  Vascular & Endovascular Surgery

## 2019-03-04 ENCOUNTER — TELEPHONE (OUTPATIENT)
Dept: GASTROENTEROLOGY | Facility: CLINIC | Age: 74
End: 2019-03-04

## 2019-03-04 RX ORDER — PANTOPRAZOLE SODIUM 40 MG/1
40 TABLET, DELAYED RELEASE ORAL DAILY
Qty: 90 TABLET | Refills: 11 | OUTPATIENT
Start: 2019-03-04 | End: 2020-09-09 | Stop reason: SDUPTHER

## 2019-03-04 NOTE — TELEPHONE ENCOUNTER
----- Message from Miriam Ignacio sent at 3/4/2019 10:55 AM CST -----  Contact: Johanny redman/ [facility] [phone]  Pharmacy is requesting a refill authorization for pantoprazole (PROTONIX) 40 MG tablet. Patient is waiting. Please advise.

## 2019-04-29 ENCOUNTER — OFFICE VISIT (OUTPATIENT)
Dept: FAMILY MEDICINE | Facility: CLINIC | Age: 74
End: 2019-04-29
Payer: MEDICARE

## 2019-04-29 VITALS
OXYGEN SATURATION: 96 % | TEMPERATURE: 98 F | HEIGHT: 73 IN | SYSTOLIC BLOOD PRESSURE: 138 MMHG | HEART RATE: 96 BPM | WEIGHT: 258 LBS | DIASTOLIC BLOOD PRESSURE: 88 MMHG | BODY MASS INDEX: 34.19 KG/M2

## 2019-04-29 DIAGNOSIS — D69.6 THROMBOCYTOPENIA: ICD-10-CM

## 2019-04-29 DIAGNOSIS — I10 HYPERTENSION, UNSPECIFIED TYPE: Primary | Chronic | ICD-10-CM

## 2019-04-29 DIAGNOSIS — Z95.5 S/P RIGHT CORONARY ARTERY (RCA) STENT PLACEMENT: ICD-10-CM

## 2019-04-29 DIAGNOSIS — R73.09 ABNORMAL GLUCOSE: ICD-10-CM

## 2019-04-29 DIAGNOSIS — I69.30 LATE EFFECT OF ISCHEMIC CEREBRAL STROKE: ICD-10-CM

## 2019-04-29 DIAGNOSIS — R53.1 RIGHT SIDED WEAKNESS: ICD-10-CM

## 2019-04-29 DIAGNOSIS — R05.9 COUGH: ICD-10-CM

## 2019-04-29 DIAGNOSIS — Z86.73 HISTORY OF STROKE: ICD-10-CM

## 2019-04-29 PROCEDURE — 99999 PR PBB SHADOW E&M-EST. PATIENT-LVL III: ICD-10-PCS | Mod: PBBFAC,,, | Performed by: INTERNAL MEDICINE

## 2019-04-29 PROCEDURE — 99214 PR OFFICE/OUTPT VISIT, EST, LEVL IV, 30-39 MIN: ICD-10-PCS | Mod: S$PBB,,, | Performed by: INTERNAL MEDICINE

## 2019-04-29 PROCEDURE — 99999 PR PBB SHADOW E&M-EST. PATIENT-LVL III: CPT | Mod: PBBFAC,,, | Performed by: INTERNAL MEDICINE

## 2019-04-29 PROCEDURE — 99213 OFFICE O/P EST LOW 20 MIN: CPT | Mod: PBBFAC,PN | Performed by: INTERNAL MEDICINE

## 2019-04-29 PROCEDURE — 99214 OFFICE O/P EST MOD 30 MIN: CPT | Mod: S$PBB,,, | Performed by: INTERNAL MEDICINE

## 2019-04-29 RX ORDER — ROSUVASTATIN CALCIUM 20 MG/1
TABLET, COATED ORAL
Refills: 3 | COMMUNITY
Start: 2019-03-12 | End: 2020-09-09

## 2019-04-29 RX ORDER — PROMETHAZINE HYDROCHLORIDE AND DEXTROMETHORPHAN HYDROBROMIDE 6.25; 15 MG/5ML; MG/5ML
5 SYRUP ORAL EVERY 6 HOURS PRN
Qty: 240 ML | Refills: 1 | Status: SHIPPED | OUTPATIENT
Start: 2019-04-29 | End: 2019-05-09

## 2019-04-29 RX ORDER — CODEINE PHOSPHATE AND GUAIFENESIN 10; 100 MG/5ML; MG/5ML
5 SOLUTION ORAL 3 TIMES DAILY PRN
Refills: 0 | Status: CANCELLED | OUTPATIENT
Start: 2019-04-29 | End: 2019-05-09

## 2019-04-29 NOTE — PATIENT INSTRUCTIONS
We have reviewed your prescription medications. You are doing very well since your stroke! We will check blood counts again. I have sent you a prescription for a cough medicine that you can use as needed for cough. I would like to recheck you in 6 months.

## 2019-05-01 ENCOUNTER — TELEPHONE (OUTPATIENT)
Dept: FAMILY MEDICINE | Facility: CLINIC | Age: 74
End: 2019-05-01

## 2019-05-01 NOTE — TELEPHONE ENCOUNTER
----- Message from Dalila Rodriguez sent at 5/1/2019  8:57 AM CDT -----  Contact: [phone]/self  Patient called in returning your call. Please advise.

## 2019-05-01 NOTE — TELEPHONE ENCOUNTER
Spoke with patient and notified of doctor findings. Patient stated he was in [place] for a few days but he would call us when he came back home so that we could schedule for him to go take his lab. Advised patient in the mean time to watch his carb and sugar intake. Verbal understanding noted.

## 2019-05-01 NOTE — TELEPHONE ENCOUNTER
----- Message from Karen Markham MD sent at 4/30/2019  8:50 PM CDT -----  Blood chemistries showed that sugar is too high. He needs to get a HgbA1c to see if he is diabetic. Blood counts looked better. Platelets were almost normal.

## 2019-05-01 NOTE — PROGRESS NOTES
Subjective:       Patient ID: Nimesh Saini is a 73 y.o. male.    Chief Complaint: Cerebrovascular Accident (follow up)     I have reviewed the PMH,  and  for this patient. He  has a past medical history of Cardiomyopathy, CHF (congestive heart failure), Coronary artery disease, Diverticulitis large intestine (08/2014), GERD (gastroesophageal reflux disease), Hypertension, and Obesity. He is here for follow-up of recent stroke. He has recently been released from . He has regained most of his speech, but he notices that he slurs a little when he is tired. His weakness has improved. He is walking straighter. He has a family history of prostate cancer.   He had labs done in January and they looked good. His bp is a little high today, but he ate crawfish this weekend. HE has had some bronchitis, and it is getting better. The cough syrup helps.     Active Ambulatory Problems     Diagnosis Date Noted    Hypertension 08/14/2014    Erectile dysfunction 09/12/2014    Obesity (BMI 30.0-34.9) 09/12/2014    Hiatal hernia with GERD 01/17/2017    Ischemic cardiomyopathy 05/02/2017    CAD, multiple vessel 05/02/2017    Combined systolic and diastolic congestive heart failure, NYHA class 3 05/02/2017    History of asbestos exposure 07/12/2017    Lung nodule 07/25/2017    Pleural scarring 11/16/2017    AICD (automatic cardioverter/defibrillator) present 02/09/2018    Moderate mitral regurgitation 03/08/2018    S/P right coronary artery (RCA) stent placement 03/08/2018    Stroke 12/25/2018    Pacemaker 01/07/2019    Thrombocytopenia 01/28/2019    BMI 34.0-34.9,adult 01/28/2019    History of stroke 04/29/2019    Right sided weakness 04/29/2019    Late effect of ischemic cerebral stroke 04/29/2019     Resolved Ambulatory Problems     Diagnosis Date Noted    Diverticulitis large intestine 08/14/2014    Epigastric abdominal pain 01/12/2017    Shortness of breath 05/02/2017    Chest pain, atypical 07/12/2017     Pleural effusion 07/12/2017    Dysphagia 03/27/2018    Numbness     Ataxia      Past Medical History:   Diagnosis Date    Cardiomyopathy     CHF (congestive heart failure)     Coronary artery disease     Diverticulitis large intestine 08/2014    GERD (gastroesophageal reflux disease)     Hypertension     Obesity          MEDICATIONS:  Current Outpatient Medications:     aspirin (ECOTRIN) 81 MG EC tablet, Take 81 mg by mouth once daily., Disp: , Rfl:     clopidogrel (PLAVIX) 75 mg tablet, Take 1 tablet (75 mg total) by mouth once daily., Disp: 30 tablet, Rfl: 4    metoprolol succinate (TOPROL-XL) 25 MG 24 hr tablet, Take 12.5 mg by mouth once daily., Disp: , Rfl:     nitroGLYCERIN (NITROSTAT) 0.4 MG SL tablet, DIS 1 T UNT Q FIVE MINUTES UP TO THREE DOSES PRF CHEST PAIN, Disp: , Rfl: 3    pantoprazole (PROTONIX) 40 MG tablet, Take 1 tablet (40 mg total) by mouth once daily., Disp: 90 tablet, Rfl: 11    rosuvastatin (CRESTOR) 20 MG tablet, TK 1 T PO 1 TIME IN THE MIKAELA, Disp: , Rfl: 3    sacubitril-valsartan (ENTRESTO) 24-26 mg per tablet, Take 1 tablet by mouth 2 (two) times daily. 49-51 mg per tablet, Disp: , Rfl:     spironolactone (ALDACTONE) 25 MG tablet, Take 25 mg by mouth once daily., Disp: , Rfl:     promethazine-dextromethorphan (PROMETHAZINE-DM) 6.25-15 mg/5 mL Syrp, Take 5 mLs by mouth every 6 (six) hours as needed., Disp: 240 mL, Rfl: 1      HEALTH MAINTENANCE:   Health Maintenance   Topic Date Due    TETANUS VACCINE  [birth year]02/1963    Zoster Vaccine  10/02/2006    Pneumococcal Vaccine (65+ Low/Medium Risk) (2 of 2 - PPSV23) 01/11/2020    High Dose Statin  04/29/2020    Lipid Panel  12/25/2023    Colonoscopy  10/01/2025    Influenza Vaccine  Completed    Abdominal Aortic Aneurysm Screening  Completed    Hepatitis C Screening  Addressed       Review of Systems   Constitutional: Negative for chills, fatigue and fever.   HENT: Negative for congestion, ear discharge, ear pain,  rhinorrhea and sore throat.    Eyes: Negative for discharge, redness and itching.   Respiratory: Negative for cough, chest tightness, shortness of breath and wheezing.    Cardiovascular: Negative for chest pain, palpitations and leg swelling.   Gastrointestinal: Negative for abdominal pain, constipation, diarrhea, nausea and vomiting.   Endocrine: Negative for cold intolerance and heat intolerance.   Genitourinary: Negative for dysuria, flank pain, frequency and hematuria.   Musculoskeletal: Negative for arthralgias, back pain, joint swelling and myalgias.   Skin: Negative for color change and rash.   Neurological: Negative for dizziness, tremors, numbness and headaches.   Psychiatric/Behavioral: Negative for dysphoric mood and sleep disturbance. The patient is not nervous/anxious.        Objective:          LABS    CMP  Sodium   Date Value Ref Range Status   04/29/2019 142 136 - 145 mmol/L Final     Potassium   Date Value Ref Range Status   04/29/2019 4.2 3.5 - 5.1 mmol/L Final     Chloride   Date Value Ref Range Status   04/29/2019 107 95 - 110 mmol/L Final     CO2   Date Value Ref Range Status   04/29/2019 25 23 - 29 mmol/L Final     Glucose   Date Value Ref Range Status   04/29/2019 153 (H) 70 - 110 mg/dL Final     BUN, Bld   Date Value Ref Range Status   04/29/2019 15 2 - 20 mg/dL Final     Creatinine   Date Value Ref Range Status   04/29/2019 0.96 0.50 - 1.40 mg/dL Final     Calcium   Date Value Ref Range Status   04/29/2019 9.5 8.7 - 10.5 mg/dL Final     Total Protein   Date Value Ref Range Status   12/25/2018 7.5 6.0 - 8.4 g/dL Final     Albumin   Date Value Ref Range Status   12/25/2018 4.4 3.5 - 5.2 g/dL Final     Total Bilirubin   Date Value Ref Range Status   12/25/2018 1.5 (H) 0.1 - 1.0 mg/dL Final     Comment:     For infants and newborns, interpretation of results should be based  on gestational age, weight and in agreement with clinical  observations.  Premature Infant recommended reference  ranges:  Up to 24 hours.............<8.0 mg/dL  Up to 48 hours............<12.0 mg/dL  3-5 days..................<15.0 mg/dL  6-29 days.................<15.0 mg/dL       Alkaline Phosphatase   Date Value Ref Range Status   12/25/2018 79 38 - 126 U/L Final     AST   Date Value Ref Range Status   12/25/2018 20 15 - 46 U/L Final     ALT   Date Value Ref Range Status   12/25/2018 19 10 - 44 U/L Final     Anion Gap   Date Value Ref Range Status   04/29/2019 10 8 - 16 mmol/L Final     eGFR if    Date Value Ref Range Status   04/29/2019 >60.0 >60 mL/min/1.73 m^2 Final     eGFR if non    Date Value Ref Range Status   04/29/2019 >60.0 >60 mL/min/1.73 m^2 Final     Comment:     Calculation used to obtain the estimated glomerular filtration  rate (eGFR) is the CKD-EPI equation.          Lab Results   Component Value Date    WBC 5.57 04/29/2019    HGB 14.9 04/29/2019    HCT 42.8 04/29/2019    MCV 91 04/29/2019     (L) 04/29/2019       No results for input(s): TSH, HDL, CHOL, TRIG, LDLCALC, CHOLHDL, NONHDLCHOL, TOTALCHOLEST in the last 2160 hours.      A1C:  No results for input(s): HGBA1C in the last 2160 hours.      Physical Exam   Constitutional: He appears well-developed and well-nourished. He does not have a sickly appearance.   HENT:   Head: Normocephalic and atraumatic.   Right Ear: External ear normal. Tympanic membrane is not erythematous. No middle ear effusion.   Left Ear: External ear normal. Tympanic membrane is not erythematous.  No middle ear effusion.   Nose: No rhinorrhea. Right sinus exhibits no maxillary sinus tenderness and no frontal sinus tenderness. Left sinus exhibits no maxillary sinus tenderness and no frontal sinus tenderness.   Mouth/Throat: No posterior oropharyngeal edema or posterior oropharyngeal erythema. No tonsillar exudate.   Eyes: Pupils are equal, round, and reactive to light. Conjunctivae and EOM are normal. Right eye exhibits no discharge. Left eye  exhibits no discharge. Right conjunctiva is not injected. Left conjunctiva is not injected.   Neck: No thyromegaly present.   Cardiovascular: Normal rate, regular rhythm, normal heart sounds and intact distal pulses.   No murmur heard.  Pulmonary/Chest: Effort normal and breath sounds normal. He has no wheezes. He has no rhonchi. He has no rales.   Abdominal: Bowel sounds are normal. He exhibits no distension. There is no tenderness.   Musculoskeletal: He exhibits no edema or tenderness.   Lymphadenopathy:     He has no cervical adenopathy.   Neurological: He displays normal reflexes. No cranial nerve deficit.   Skin: Skin is warm and dry. No lesion and no rash noted.   Psychiatric: He has a normal mood and affect. His behavior is normal. His mood appears not anxious. His speech is not rapid and/or pressured. He is not agitated and not aggressive. He does not exhibit a depressed mood.             Assessment and Plan:     Problem List Items Addressed This Visit        Neuro    History of stroke - stable on plavix and aspirin.       Late effect of ischemic cerebral stroke - he has some residual weakness and slurring of sppech when tired.        Cardiac/Vascular    Hypertension - Primary (Chronic) - BP is  alittle high today. He reports that it is good at home.     Relevant Orders    Basic metabolic panel (Completed)    S/P right coronary artery (RCA) stent placement - stable. On plavix.        Hematology    Thrombocytopenia - stable. Continue to monitor.     Relevant Orders    CBC auto differential (Completed)       Other    Right sided weakness - secondary to stroke. Released from pt.       Other Visit Diagnoses     Cough    Getting better. Refill cough syrup.           Orders Placed This Encounter    CBC auto differential    Basic metabolic panel    promethazine-dextromethorphan (PROMETHAZINE-DM) 6.25-15 mg/5 mL Syrp         Follow-up with me in 6 months.

## 2019-12-05 ENCOUNTER — PES CALL (OUTPATIENT)
Dept: ADMINISTRATIVE | Facility: CLINIC | Age: 74
End: 2019-12-05

## 2020-08-03 LAB
CHOLEST SERPL-MSCNC: 223 MG/DL (ref 0–200)
HDL/CHOLESTEROL RATIO: 5.3
HDLC SERPL-MCNC: 42 MG/DL
LDLC SERPL CALC-MCNC: 180 MG/DL
NON HDL CHOL (CALC): 181
TRIGL SERPL-MCNC: 117 MG/DL
VLDL CHOLESTEROL: 23 MG/DL

## 2020-08-21 ENCOUNTER — PATIENT OUTREACH (OUTPATIENT)
Dept: ADMINISTRATIVE | Facility: HOSPITAL | Age: 75
End: 2020-08-21

## 2020-09-01 ENCOUNTER — PATIENT OUTREACH (OUTPATIENT)
Dept: ADMINISTRATIVE | Facility: HOSPITAL | Age: 75
End: 2020-09-01

## 2020-09-02 ENCOUNTER — PATIENT OUTREACH (OUTPATIENT)
Dept: ADMINISTRATIVE | Facility: HOSPITAL | Age: 75
End: 2020-09-02

## 2020-09-02 ENCOUNTER — OFFICE VISIT (OUTPATIENT)
Dept: FAMILY MEDICINE | Facility: CLINIC | Age: 75
End: 2020-09-02
Payer: MEDICARE

## 2020-09-02 VITALS
DIASTOLIC BLOOD PRESSURE: 78 MMHG | RESPIRATION RATE: 18 BRPM | TEMPERATURE: 98 F | WEIGHT: 269.88 LBS | SYSTOLIC BLOOD PRESSURE: 142 MMHG | HEIGHT: 73 IN | HEART RATE: 62 BPM | BODY MASS INDEX: 35.77 KG/M2 | OXYGEN SATURATION: 96 %

## 2020-09-02 DIAGNOSIS — R73.9 HYPERGLYCEMIA: ICD-10-CM

## 2020-09-02 DIAGNOSIS — I50.42 CHRONIC COMBINED SYSTOLIC AND DIASTOLIC CONGESTIVE HEART FAILURE, NYHA CLASS 3: Chronic | ICD-10-CM

## 2020-09-02 DIAGNOSIS — I10 ESSENTIAL HYPERTENSION: Primary | Chronic | ICD-10-CM

## 2020-09-02 PROBLEM — I69.354 HEMIPLEGIA AND HEMIPARESIS FOLLOWING CEREBRAL INFARCTION AFFECTING LEFT NON-DOMINANT SIDE: Status: ACTIVE | Noted: 2020-09-02

## 2020-09-02 PROCEDURE — 99204 OFFICE O/P NEW MOD 45 MIN: CPT | Mod: S$PBB,,, | Performed by: FAMILY MEDICINE

## 2020-09-02 PROCEDURE — 99204 PR OFFICE/OUTPT VISIT, NEW, LEVL IV, 45-59 MIN: ICD-10-PCS | Mod: S$PBB,,, | Performed by: FAMILY MEDICINE

## 2020-09-02 PROCEDURE — 99999 PR PBB SHADOW E&M-EST. PATIENT-LVL IV: CPT | Mod: PBBFAC,,, | Performed by: FAMILY MEDICINE

## 2020-09-02 PROCEDURE — 99999 PR PBB SHADOW E&M-EST. PATIENT-LVL IV: ICD-10-PCS | Mod: PBBFAC,,, | Performed by: FAMILY MEDICINE

## 2020-09-02 PROCEDURE — 99214 OFFICE O/P EST MOD 30 MIN: CPT | Mod: PBBFAC,PN | Performed by: FAMILY MEDICINE

## 2020-09-02 NOTE — ASSESSMENT & PLAN NOTE
Only mildly elevated; managed by Cardiology  Continue medications as prescribed (recently increased metoprolol?)  Will obtain records

## 2020-09-02 NOTE — PROGRESS NOTES
Saved 2020 records from Dr. Avendano in . Sent notification to Dr. Foster.   Updated 8/3/20 Lipid Panel.

## 2020-09-02 NOTE — PROGRESS NOTES
"  NEW PATIENT VISIT FAMILY MEDICINE    CC:   Chief Complaint   Patient presents with    Follow-up     SOB       HPI: Nimesh Saini  is a 74 y.o. male:    HTN  -saw Dr Avendano (Cardiology) and increased BP medication about 1 month ago  -reports weight was 265lbs about 1 month ago   -was told his blood work was "fine"  -unsure if his recent blood work checked for diabetes    Hyperglycemia  -sometimes when he gets hungry, gets episodes of sweatiness and dizziness  -these episodes happened about a year ago    Shortness of breath in setting of CHF  -feels it's overall "the same" maybe slightly worse   -weight increased by hx about 4lbs   -admits diet is not well controlled   -does not take "water pill" every day like he's "supposed to" but unsure which medicine this is; thinks it is spironolactone; denies taking another water pill  -was recently told his kidney function is "good"     ROS: Review of Systems   Constitutional: Negative for chills and fever.   HENT: Negative for ear pain and sore throat.    Eyes: Negative for blurred vision and discharge.   Respiratory: Positive for shortness of breath. Negative for cough.    Cardiovascular: Negative for chest pain and palpitations.   Gastrointestinal: Negative for abdominal pain and vomiting.   Genitourinary: Negative for dysuria and hematuria.   Musculoskeletal: Negative for falls and joint pain.   Skin: Negative for itching and rash.   Neurological: Negative for dizziness and headaches.   Psychiatric/Behavioral: Negative for depression. The patient is not nervous/anxious.        PMHX:   Past Medical History:   Diagnosis Date    Cardiomyopathy     ICM EF 15%    CHF (congestive heart failure)     chronic combined     Coronary artery disease     3 vessel    Diverticulitis large intestine 08/2014    GERD (gastroesophageal reflux disease)     Hypertension     Obesity        PSHX:   Past Surgical History:   Procedure Laterality Date    CARDIAC DEFIBRILLATOR PLACEMENT " Left 2017    CORONARY ANGIOPLASTY WITH STENT PLACEMENT  2017    coronary angiogram; stents x 5    UMBILICAL HERNIA REPAIR             FAMHX:   Family History   Problem Relation Age of Onset    Diabetes Mother     Hypertension Father     Stroke Father     Asthma Neg Hx     Emphysema Neg Hx        SOCHX:   Social History     Socioeconomic History    Marital status:      Spouse name: Not on file    Number of children: Not on file    Years of education: Not on file    Highest education level: Not on file   Occupational History    Not on file   Social Needs    Financial resource strain: Not on file    Food insecurity     Worry: Not on file     Inability: Not on file    Transportation needs     Medical: Not on file     Non-medical: Not on file   Tobacco Use    Smoking status: Former Smoker     Packs/day: 0.25     Years: 10.00     Pack years: 2.50     Types: Cigarettes     Start date: 1963     Quit date: 1971     Years since quittin.7    Smokeless tobacco: Never Used   Substance and Sexual Activity    Alcohol use: No     Comment: socially    Drug use: No    Sexual activity: Not on file   Lifestyle    Physical activity     Days per week: Not on file     Minutes per session: Not on file    Stress: Not at all   Relationships    Social connections     Talks on phone: Not on file     Gets together: Not on file     Attends Restorationist service: Not on file     Active member of club or organization: Not on file     Attends meetings of clubs or organizations: Not on file     Relationship status: Not on file   Other Topics Concern    Not on file   Social History Narrative    Retired large . Lives alone. [employer]. [place].         ALLERGIES: Review of patient's allergies indicates:  No Known Allergies    MEDS:   Current Outpatient Medications:     aspirin (ECOTRIN) 81 MG EC tablet, Take 81 mg by mouth once daily., Disp: , Rfl:  "    metoprolol succinate (TOPROL-XL) 25 MG 24 hr tablet, Take 12.5 mg by mouth once daily., Disp: , Rfl:     nitroGLYCERIN (NITROSTAT) 0.4 MG SL tablet, DIS 1 T UNT Q FIVE MINUTES UP TO THREE DOSES PRF CHEST PAIN, Disp: , Rfl: 3    rosuvastatin (CRESTOR) 20 MG tablet, TK 1 T PO 1 TIME IN THE MIKAELA, Disp: , Rfl: 3    sacubitril-valsartan (ENTRESTO) 24-26 mg per tablet, Take 1 tablet by mouth 2 (two) times daily. 49-51 mg per tablet, Disp: , Rfl:     clopidogrel (PLAVIX) 75 mg tablet, Take 1 tablet (75 mg total) by mouth once daily. (Patient not taking: Reported on 9/2/2020), Disp: 30 tablet, Rfl: 4    pantoprazole (PROTONIX) 40 MG tablet, Take 1 tablet (40 mg total) by mouth once daily. (Patient not taking: Reported on 9/2/2020), Disp: 90 tablet, Rfl: 11    spironolactone (ALDACTONE) 25 MG tablet, Take 25 mg by mouth once daily., Disp: , Rfl:    *injectable cholesterol medication; patient unsure of name    OBJECTIVE:   Vitals:    09/02/20 0904   BP: (!) 142/78   BP Location: Left arm   Patient Position: Sitting   BP Method: Large (Manual)   Pulse: 62   Resp: 18   Temp: 97.8 °F (36.6 °C)   SpO2: 96%   Weight: 122.4 kg (269 lb 14.4 oz)   Height: 6' 1" (1.854 m)     Body mass index is 35.61 kg/m².      Physical Exam  Vitals signs and nursing note reviewed.   Constitutional:       General: He is not in acute distress.  HENT:      Head: Normocephalic.      Right Ear: External ear normal.      Left Ear: External ear normal.   Eyes:      General: No scleral icterus.     Extraocular Movements: Extraocular movements intact.   Cardiovascular:      Rate and Rhythm: Normal rate and regular rhythm.      Heart sounds: Normal heart sounds.   Pulmonary:      Effort: Pulmonary effort is normal.      Breath sounds: Normal breath sounds.   Abdominal:      General: Bowel sounds are normal.      Palpations: Abdomen is soft.      Tenderness: There is no abdominal tenderness.   Musculoskeletal:         General: No deformity.      " Comments: Moving all 4 extremities    Skin:     Coloration: Skin is not jaundiced.      Findings: No rash.      Comments: No obvious lesions on exposed skin   Neurological:      General: No focal deficit present.      Mental Status: He is alert.      Gait: Gait normal.   Psychiatric:         Behavior: Behavior normal.         Thought Content: Thought content normal.           LABS:   A1C:  Recent Labs   Lab 12/26/18  0544   Hemoglobin A1C 4.9     CBC:  Recent Labs   Lab 04/29/19  0919   WBC 5.57   RBC 4.71   Hemoglobin 14.9   Hematocrit 42.8   Platelets 148 L   Mean Corpuscular Volume 91   Mean Corpuscular Hemoglobin 31.6 H   Mean Corpuscular Hemoglobin Conc 34.8     CMP:  Recent Labs   Lab 12/25/18  1739  04/29/19  0919   Glucose 109   < > 153 H   Calcium 9.8   < > 9.5   Albumin 4.4  --   --    Total Protein 7.5  --   --    Sodium 144   < > 142   Potassium 4.7   < > 4.2   CO2 27   < > 25   Chloride 109   < > 107   BUN, Bld 24 H   < > 15   Creatinine 1.21   < > 0.96   Alkaline Phosphatase 79  --   --    ALT 19  --   --    AST 20  --   --    Total Bilirubin 1.5 H  --   --     < > = values in this interval not displayed.     LIPIDS:  Recent Labs   Lab 12/25/18  1739   TSH 1.810   HDL 62   Cholesterol 161   Triglycerides 100   LDL Cholesterol 79.0   Hdl/Cholesterol Ratio 38.5   Non-HDL Cholesterol 99   Total Cholesterol/HDL Ratio 2.6         ASSESSMENT:  Hypertension  Only mildly elevated; managed by Cardiology  Continue medications as prescribed (recently increased metoprolol?)  Will obtain records     Combined systolic and diastolic congestive heart failure, NYHA class 3  Chronically uncontrolled; weight up ~4lbs by history; he does not take his spironolactone due to cramps  Shared decision making to take spironolactone 3 days in a row or at least every other day for the next week and monitor weight daily  Followed by Cardiology, will request records       Hyperglycemia  Hyperglycemia per 2019 labs. Patient reports  he had labs recently. Will request records and order A1c if needed.       PLAN:      No orders of the defined types were placed in this encounter.      Follow up in about 1 week (around 9/9/2020) for heart failure; med review - please bring medications.    Dr. Lian Foster MD  Family Medicine

## 2020-09-02 NOTE — PATIENT INSTRUCTIONS
Heart Failure: Tracking Your Weight  You have a condition called heart failure. When you have heart failure, a sudden weight gain or a steady rise in weight is a warning sign that your body is retaining too much water and salt. This could mean your heart failure is getting worse. If left untreated, it can cause problems for your lungs and result in shortness of breath. Weighing yourself each day is the best way to know if youre retaining water. If your weight goes up quickly, call your doctor. You will be given instructions on how to get rid of the excess water. You will likely need medicines and to avoid salt. This will help your heart work better.  Call your doctor if you gain more than 2 pounds in 1 day, more than 5 pounds in 1 week, or whatever weight gain you were told to report by your doctor. This is often a sign of worsening heart failure and needs to be evaluated and treated. Your doctor will tell you what to do next.   Tips for weighing yourself    · Weigh yourself at the same time each morning, wearing the same clothes. Weigh yourself after urinating and before eating.  · Use the same scale each day. Make sure the numbers are easy to read. Put the scale on a flat, hard surface -- not on a rug or carpet.  · Do not stop weighing yourself. If you forget one day, weigh again the next morning.  How to use your weight chart  · Keep your weight chart near the scale. Write your weight on the chart as soon as you get off the scale.  · Fill in the month and the start date on the chart. Then write down your weight each day. Your chart will look like this:    · If you miss a day, leave the space blank. Weigh yourself the next day and write your weight in the next space.  · Take your weight chart with you when you go to see your doctor.  Date Last Reviewed: 3/20/2016  © 5568-6246 [employer]. [address] [place] [address], [place] [address]. All rights reserved. This information is not intended as a  substitute for professional medical care. Always follow your healthcare professional's instructions.        Heart Failure: Warning Signs of a Flare-Up  You have a condition called heart failure. Once you have heart failure, flare-ups can happen. Below are signs that can mean your heart failure is getting worse. If you notice any of these warning signs, call your healthcare provider.  Swelling    · Your feet, ankles, or lower legs get puffier.  · You notice skin changes on your lower legs.  · Your shoes feel too tight.  · Your clothes are tighter in the waist.  · You have trouble getting rings on or off your fingers.  Shortness of breath  · You have to breathe harder even when youre doing your normal activities or when youre resting.  · You are short of breath walking up stairs or even short distances.  · You wake up at night short of breath or coughing.  · You need to use more pillows or sit up to sleep.  · You wake up tired or restless.  Other warning signs  · You feel weaker, dizzy, or more tired.  · You have chest pain or changes in your heartbeat.  · You have a cough that wont go away.  · You cant remember things or dont feel like eating.  Tracking your weight  Gaining weight is often the first warning sign that heart failure is getting worse. Gaining even a few pounds can be a sign that your body is retaining excess water and salt. Weighing yourself each day in the morning after you urinate and before you eat, is the best way to know if you're retaining water. Get a scale that is easy to read and make sure you wear the same clothes and use the same scale every time you weigh. Your healthcare provider will show you how to track your weight. Call your doctor if you gain more than 2 pounds in 1 day, 5 pounds in 1 week, or whatever weight gain you were told to report by your doctor. This is often a sign of worsening heart failure and needs to be evaluated and treated before it compromises your breathing. Your  doctor will tell you what to do next.    Date Last Reviewed: 3/15/2016  © 4068-2699 [employer]. [address] [place] [address], [place] [address]. All rights reserved. This information is not intended as a substitute for professional medical care. Always follow your healthcare professional's instructions.

## 2020-09-02 NOTE — ASSESSMENT & PLAN NOTE
Chronically uncontrolled; weight up ~4lbs by history; he does not take his spironolactone due to cramps  Shared decision making to take spironolactone 3 days in a row or at least every other day for the next week and monitor weight daily  Followed by Cardiology, will request records

## 2020-09-02 NOTE — ASSESSMENT & PLAN NOTE
Hyperglycemia per 2019 labs. Patient reports he had labs recently. Will request records and order A1c if needed.

## 2020-09-02 NOTE — LETTER
AUTHORIZATION FOR RELEASE OF   CONFIDENTIAL INFORMATION    Dear Dr. Avendano,    We are seeing Nimesh Saini, date of birth 1945, in the clinic at [facility]. Lian Foster MD is the patient's PCP. Nimesh Saini has an outstanding lab/procedure at the time we reviewed his chart. In order to help keep his health information updated, he has authorized us to request the following medical record(s):              ( X )  ENTIRE RECORD           Please fax records to us at [phone].     Attention: Polly Rajput     If you have any questions, please contact me at [phone].          Patient Name: Nimesh Saini  : 1945  Patient Phone #: [phone]

## 2020-09-09 ENCOUNTER — OFFICE VISIT (OUTPATIENT)
Dept: FAMILY MEDICINE | Facility: CLINIC | Age: 75
End: 2020-09-09
Payer: MEDICARE

## 2020-09-09 VITALS
RESPIRATION RATE: 18 BRPM | BODY MASS INDEX: 35.61 KG/M2 | WEIGHT: 268.69 LBS | TEMPERATURE: 99 F | HEIGHT: 73 IN | DIASTOLIC BLOOD PRESSURE: 82 MMHG | OXYGEN SATURATION: 95 % | HEART RATE: 67 BPM | SYSTOLIC BLOOD PRESSURE: 140 MMHG

## 2020-09-09 DIAGNOSIS — I50.42 CHRONIC COMBINED SYSTOLIC AND DIASTOLIC CONGESTIVE HEART FAILURE, NYHA CLASS 3: Primary | Chronic | ICD-10-CM

## 2020-09-09 DIAGNOSIS — I10 ESSENTIAL HYPERTENSION: Chronic | ICD-10-CM

## 2020-09-09 DIAGNOSIS — R73.9 HYPERGLYCEMIA: ICD-10-CM

## 2020-09-09 PROBLEM — H91.90 HEARING LOSS: Status: ACTIVE | Noted: 2020-09-09

## 2020-09-09 PROBLEM — E66.9 OBESITY: Status: ACTIVE | Noted: 2019-01-28

## 2020-09-09 PROBLEM — H93.19 TINNITUS: Status: ACTIVE | Noted: 2020-09-09

## 2020-09-09 PROBLEM — D48.5 NEOPLASM OF UNCERTAIN BEHAVIOR OF SKIN: Status: ACTIVE | Noted: 2020-09-09

## 2020-09-09 PROBLEM — Z72.0 TOBACCO USER: Status: ACTIVE | Noted: 2020-09-09

## 2020-09-09 PROBLEM — K57.90 DIVERTICULOSIS: Status: ACTIVE | Noted: 2020-09-09

## 2020-09-09 PROBLEM — E78.5 HYPERLIPIDEMIA: Status: ACTIVE | Noted: 2020-09-09

## 2020-09-09 PROCEDURE — 99214 OFFICE O/P EST MOD 30 MIN: CPT | Mod: PBBFAC,PN | Performed by: FAMILY MEDICINE

## 2020-09-09 PROCEDURE — 99999 PR PBB SHADOW E&M-EST. PATIENT-LVL IV: ICD-10-PCS | Mod: PBBFAC,,, | Performed by: FAMILY MEDICINE

## 2020-09-09 PROCEDURE — 99214 OFFICE O/P EST MOD 30 MIN: CPT | Mod: S$PBB,,, | Performed by: FAMILY MEDICINE

## 2020-09-09 PROCEDURE — 99999 PR PBB SHADOW E&M-EST. PATIENT-LVL IV: CPT | Mod: PBBFAC,,, | Performed by: FAMILY MEDICINE

## 2020-09-09 PROCEDURE — 99214 PR OFFICE/OUTPT VISIT, EST, LEVL IV, 30-39 MIN: ICD-10-PCS | Mod: S$PBB,,, | Performed by: FAMILY MEDICINE

## 2020-09-09 RX ORDER — LANOLIN ALCOHOL/MO/W.PET/CERES
1 CREAM (GRAM) TOPICAL DAILY
COMMUNITY
Start: 2020-05-18

## 2020-09-09 RX ORDER — ACETAMINOPHEN 500 MG
1 TABLET ORAL DAILY
COMMUNITY
Start: 2020-05-18 | End: 2020-09-09 | Stop reason: SDUPTHER

## 2020-09-09 RX ORDER — ACETAMINOPHEN 500 MG
1 TABLET ORAL DAILY
Qty: 90 CAPSULE | Refills: 2 | Status: SHIPPED | OUTPATIENT
Start: 2020-09-09 | End: 2020-12-08

## 2020-09-09 RX ORDER — PANTOPRAZOLE SODIUM 40 MG/1
1 TABLET, DELAYED RELEASE ORAL DAILY
COMMUNITY
Start: 2020-05-18

## 2020-09-09 RX ORDER — ASPIRIN 81 MG/1
1 TABLET ORAL DAILY
COMMUNITY
Start: 2020-05-18

## 2020-09-09 RX ORDER — METOPROLOL SUCCINATE 25 MG/1
1 TABLET, EXTENDED RELEASE ORAL DAILY
COMMUNITY
Start: 2020-05-18

## 2020-09-09 RX ORDER — LANOLIN ALCOHOL/MO/W.PET/CERES
1000 CREAM (GRAM) TOPICAL DAILY
COMMUNITY
End: 2020-09-09 | Stop reason: SDUPTHER

## 2020-09-09 NOTE — PATIENT INSTRUCTIONS
It was nice to see you today, Nimesh! I'm glad you're feeling better.    -try taking SPIRONOLACTONE every 2 days; check with your Cardiologist about your last weight and if he thinks this dosing of your water pill is okay  -limit your salt intake (I.e. potato chips); this may be the best way to control your symptoms for now since you may be having side effects from your water pill  -we will check blood work today      Heart Failure: Tracking Your Weight  You have a condition called heart failure. When you have heart failure, a sudden weight gain or a steady rise in weight is a warning sign that your body is retaining too much water and salt. This could mean your heart failure is getting worse. If left untreated, it can cause problems for your lungs and result in shortness of breath. Weighing yourself each day is the best way to know if youre retaining water. If your weight goes up quickly, call your doctor. You will be given instructions on how to get rid of the excess water. You will likely need medicines and to avoid salt. This will help your heart work better.  Call your doctor if you gain more than 2 pounds in 1 day, more than 5 pounds in 1 week, or whatever weight gain you were told to report by your doctor. This is often a sign of worsening heart failure and needs to be evaluated and treated. Your doctor will tell you what to do next.   Tips for weighing yourself    · Weigh yourself at the same time each morning, wearing the same clothes. Weigh yourself after urinating and before eating.  · Use the same scale each day. Make sure the numbers are easy to read. Put the scale on a flat, hard surface -- not on a rug or carpet.  · Do not stop weighing yourself. If you forget one day, weigh again the next morning.  How to use your weight chart  · Keep your weight chart near the scale. Write your weight on the chart as soon as you get off the scale.  · Fill in the month and the start date on the chart. Then write down  your weight each day. Your chart will look like this:    · If you miss a day, leave the space blank. Weigh yourself the next day and write your weight in the next space.  · Take your weight chart with you when you go to see your doctor.  Date Last Reviewed: 3/20/2016  © 3947-3564 [employer]. [address] [place] [address], [place] [address]. All rights reserved. This information is not intended as a substitute for professional medical care. Always follow your healthcare professional's instructions.

## 2020-09-09 NOTE — ASSESSMENT & PLAN NOTE
Uncontrolled.  Managed by Cardiology  BP goal less than 140/90  Continue medication(s) as prescribed.  Labs reviewed and up to date.   Will try to get last office visit note from Cardiology; patient has appt with Cardiology today

## 2020-09-09 NOTE — PROGRESS NOTES
EST PATIENT VISIT [facility]ILY [facility]    CC:   Chief Complaint   Patient presents with    Follow-up       HPI: Nimesh Saini  is a 74 y.o. male:    -patient brought his medications today; reviewed and updated in med lists  HF  -took spironolactone twice since last visit; today and Monday  -notes cramping in legs  -questions if it cholesterol injection  -has been limiting his salt  -feels overall better; breathing is better   -home scale was at 268lbs  -has appt with Cardiology today      ROS: Review of Systems   Constitutional: Negative for fever and weight loss.   Respiratory: Positive for shortness of breath. Negative for cough.         Improved per patient   Cardiovascular: Negative for chest pain and palpitations.   Musculoskeletal: Positive for myalgias.   Skin: Negative for rash.       PMHX:   Past Medical History:   Diagnosis Date    Cardiomyopathy     ICM EF 15%    CHF (congestive heart failure)     chronic combined     Coronary artery disease     3 vessel    Diverticulitis large intestine 08/2014    GERD (gastroesophageal reflux disease)     Hypertension     Obesity        PSHX:   Past Surgical History:   Procedure Laterality Date    CARDIAC DEFIBRILLATOR PLACEMENT Left 09/2017    CORONARY ANGIOPLASTY WITH STENT PLACEMENT  06/2017    coronary angiogram; stents x 5    UMBILICAL HERNIA REPAIR      1990's       FAMHX:   Family History   Problem Relation Age of Onset    Diabetes Mother     Hypertension Father     Stroke Father     Asthma Neg Hx     Emphysema Neg Hx        SOCHX:   Social History     Socioeconomic History    Marital status:      Spouse name: Not on file    Number of children: Not on file    Years of education: Not on file    Highest education level: Not on file   Occupational History    Not on file   Social Needs    Financial resource strain: Not on file    Food insecurity     Worry: Not on file     Inability: Not on file    Transportation needs     Medical: Not on  file     Non-medical: Not on file   Tobacco Use    Smoking status: Former Smoker     Packs/day: 0.25     Years: 10.00     Pack years: 2.50     Types: Cigarettes     Start date: 1963     Quit date: 1971     Years since quittin.7    Smokeless tobacco: Never Used   Substance and Sexual Activity    Alcohol use: No     Comment: socially    Drug use: No    Sexual activity: Not on file   Lifestyle    Physical activity     Days per week: Not on file     Minutes per session: Not on file    Stress: Not at all   Relationships    Social connections     Talks on phone: Not on file     Gets together: Not on file     Attends Taoist service: Not on file     Active member of club or organization: Not on file     Attends meetings of clubs or organizations: Not on file     Relationship status: Not on file   Other Topics Concern    Not on file   Social History Narrative    Retired large . Lives alone. [employer]. [place].         ALLERGIES:   Review of patient's allergies indicates:   Allergen Reactions    Atorvastatin Other (See Comments)       MEDS:   Current Outpatient Medications:     aspirin (ECOTRIN) 81 MG EC tablet, Take 1 tablet by mouth once daily., Disp: , Rfl:     cholecalciferol, vitamin D3, (VITAMIN D3) 50 mcg (2,000 unit) Cap, Take 1 capsule (2,000 Units total) by mouth once daily., Disp: 90 capsule, Rfl: 2    cyanocobalamin (VITAMIN B-12) 1000 MCG tablet, Take 1 tablet by mouth once daily., Disp: , Rfl:     metoprolol succinate (TOPROL-XL) 25 MG 24 hr tablet, Take 1 tablet by mouth once daily., Disp: , Rfl:     nitroGLYCERIN (NITROSTAT) 0.4 MG SL tablet, DIS 1 T UNT Q FIVE MINUTES UP TO THREE DOSES PRF CHEST PAIN, Disp: , Rfl: 3    pantoprazole (PROTONIX) 40 MG tablet, Take 1 tablet by mouth once daily., Disp: , Rfl:     sacubitriL-valsartan (ENTRESTO) 49-51 mg per tablet, Take 1 tablet by mouth once daily., Disp: , Rfl:      "spironolactone (ALDACTONE) 25 MG tablet, Take 25 mg by mouth once daily., Disp: , Rfl:     OBJECTIVE:   Vitals:    09/09/20 0955   BP: (!) 140/82   BP Location: Left arm   Patient Position: Sitting   BP Method: Large (Manual)   Pulse: 67   Resp: 18   Temp: 98.7 °F (37.1 °C)   SpO2: 95%   Weight: 121.9 kg (268 lb 11.2 oz)   Height: 6' 1" (1.854 m)     Body mass index is 35.45 kg/m².      Physical Exam  Vitals signs and nursing note reviewed.   Constitutional:       Appearance: Normal appearance.   HENT:      Head: Normocephalic.   Eyes:      General:         Right eye: No discharge.         Left eye: No discharge.      Extraocular Movements: Extraocular movements intact.   Cardiovascular:      Rate and Rhythm: Normal rate and regular rhythm.   Pulmonary:      Effort: No respiratory distress.      Breath sounds: Rales present. No wheezing.   Musculoskeletal:      Comments: Edema of LE improved b/l compared to last exam; trace    Skin:     Comments: No obvious rash on exposed skin   Neurological:      Mental Status: He is alert.   Psychiatric:         Behavior: Behavior normal.           LABS:   LIPIDS:  Recent Labs   Lab 12/25/18  1739 08/03/20   TSH 1.810  --    HDL 62 42   Cholesterol 161 223 A   Triglycerides 100 117   LDL Cholesterol 79.0 180   Hdl/Cholesterol Ratio 38.5 5.3   Non-HDL Cholesterol 99  --    Total Cholesterol/HDL Ratio 2.6  --        ASSESSMENT & PLAN:  Hypertension  Uncontrolled.  Managed by Cardiology  BP goal less than 140/90  Continue medication(s) as prescribed.  Labs reviewed and up to date.   Will try to get last office visit note from Cardiology; patient has appt with Cardiology today         Combined systolic and diastolic congestive heart failure, NYHA class 3  Chronically uncontrolled; weight down by 1lb since last week. Patient unable to tolerate daily diuretic. Reports improved symptoms.   Shared decision making with to take spironolactone every 2 days and monitor weight " daily  Encouraged patient to continue limiting salt since he's unable to tolerate diuretic due to cramping  Followed by Cardiology; will request last office visit note  Will check BMP    Hyperglycemia  Hyperglycemia on 2019 labs  Will check A1c      Orders Placed This Encounter    Basic metabolic panel    Hemoglobin A1C    cholecalciferol, vitamin D3, (VITAMIN D3) 50 mcg (2,000 unit) Cap       Follow up in about 1 month (around 10/9/2020) for blood pressure, heart failure.    RTC/ED precautions discussed where applicable.   Encouraged patient to let me know if there are any further questions/concerns.     Advise patient/caretaker to check with insurance regarding orders to avoid unexpected fees/costs.     The patient/caretaker indicates understanding of these issues and agrees with the plan.    Dr. Lian Foster MD  Family Medicine

## 2020-09-09 NOTE — ASSESSMENT & PLAN NOTE
Chronically uncontrolled; weight down by 1lb since last week. Patient unable to tolerate daily diuretic. Reports improved symptoms.   Shared decision making with to take spironolactone every 2 days and monitor weight daily  Encouraged patient to continue limiting salt since he's unable to tolerate diuretic due to cramping  Followed by Cardiology; will request last office visit note  Will check BMP

## 2020-09-18 ENCOUNTER — TELEPHONE (OUTPATIENT)
Dept: FAMILY MEDICINE | Facility: CLINIC | Age: 75
End: 2020-09-18

## 2020-09-18 RX ORDER — CODEINE PHOSPHATE AND GUAIFENESIN 10; 100 MG/5ML; MG/5ML
5 SOLUTION ORAL 3 TIMES DAILY PRN
Qty: 45 ML | Refills: 0 | Status: SHIPPED | OUTPATIENT
Start: 2020-09-18 | End: 2020-09-21

## 2020-09-18 NOTE — TELEPHONE ENCOUNTER
Patient came to lobby with cough. Well appearing overall. Asking for cough medication. Promethazine is making him sleepy. I discussed with patient. He has taking codeine in the past per chart review.  reviewed and appropriate. Will give him 3 day supply. Medication side/effects and interactions discussed. Scheduled follow up for cough on Tuesday. Gave him NP triage line phone number as well.       Dr Foster

## 2020-09-22 ENCOUNTER — OFFICE VISIT (OUTPATIENT)
Dept: FAMILY MEDICINE | Facility: CLINIC | Age: 75
End: 2020-09-22
Payer: MEDICARE

## 2020-09-22 VITALS
WEIGHT: 262.88 LBS | DIASTOLIC BLOOD PRESSURE: 80 MMHG | OXYGEN SATURATION: 97 % | RESPIRATION RATE: 18 BRPM | SYSTOLIC BLOOD PRESSURE: 148 MMHG | TEMPERATURE: 98 F | HEIGHT: 73 IN | HEART RATE: 64 BPM | BODY MASS INDEX: 34.84 KG/M2

## 2020-09-22 DIAGNOSIS — I10 ESSENTIAL HYPERTENSION: Primary | Chronic | ICD-10-CM

## 2020-09-22 DIAGNOSIS — R05.9 COUGH: ICD-10-CM

## 2020-09-22 DIAGNOSIS — I50.42 CHRONIC COMBINED SYSTOLIC AND DIASTOLIC CONGESTIVE HEART FAILURE, NYHA CLASS 3: Chronic | ICD-10-CM

## 2020-09-22 PROCEDURE — 99214 OFFICE O/P EST MOD 30 MIN: CPT | Mod: S$PBB,,, | Performed by: FAMILY MEDICINE

## 2020-09-22 PROCEDURE — 99999 PR PBB SHADOW E&M-EST. PATIENT-LVL IV: ICD-10-PCS | Mod: PBBFAC,,, | Performed by: FAMILY MEDICINE

## 2020-09-22 PROCEDURE — 99214 OFFICE O/P EST MOD 30 MIN: CPT | Mod: PBBFAC,PN | Performed by: FAMILY MEDICINE

## 2020-09-22 PROCEDURE — 99214 PR OFFICE/OUTPT VISIT, EST, LEVL IV, 30-39 MIN: ICD-10-PCS | Mod: S$PBB,,, | Performed by: FAMILY MEDICINE

## 2020-09-22 PROCEDURE — 99999 PR PBB SHADOW E&M-EST. PATIENT-LVL IV: CPT | Mod: PBBFAC,,, | Performed by: FAMILY MEDICINE

## 2020-09-22 RX ORDER — PREDNISONE 50 MG/1
50 TABLET ORAL DAILY
Qty: 5 TABLET | Refills: 0 | Status: SHIPPED | OUTPATIENT
Start: 2020-09-22

## 2020-09-22 RX ORDER — AZITHROMYCIN 250 MG/1
TABLET, FILM COATED ORAL
Qty: 6 TABLET | Refills: 0 | Status: SHIPPED | OUTPATIENT
Start: 2020-09-22 | End: 2020-09-27

## 2020-09-22 NOTE — ASSESSMENT & PLAN NOTE
Uncontrolled.   BP goal less than 140/90  Continue medication(s) as prescribed.  Labs reviewed and up to date.   Follow up with Cardiology as scheduled

## 2020-09-22 NOTE — PATIENT INSTRUCTIONS
-start taking the antibiotic (azithromycin) and the steroid (prednisone) for the next 5 days  -use the inhaler as needed for cough/wheezing  -see me in 1 week or sooner if you're not better  Let me know if you have any questions/concerns.     Sincerely,     Dr Foster    What Is Acute Bronchitis?  Acute bronchitis is when the airways in your lungs (bronchial tubes) become red and swollen (inflamed). It is usually caused by a viral infection. But it can also occur because of a bacteria or allergen. Symptoms include a cough that produces yellow or greenish mucus and can last for days or sometimes weeks.  Inside healthy lungs    Air travels in and out of the lungs through the airways. The linings of these airways produce sticky mucus. This mucus traps particles that enter the lungs. Tiny structures called cilia then sweep the particles out of the airways.     Healthy airway: Airways are normally open. Air moves in and out easily.      Healthy cilia: Tiny, hairlike cilia sweep mucus and particles up and out of the airways.   Lungs with bronchitis  Bronchitis often occurs with a cold or the flu virus. The airways become inflamed (red and swollen). There is a deep hacking cough from the extra mucus. Other symptoms may include:  · Wheezing  · Chest discomfort  · Shortness of breath  · Mild fever  A second infection, this time due to bacteria, may then occur. And airways irritated by allergens or smoke are more likely to get infected.        Inflamed airway: Inflammation and extra mucus narrow the airway, causing shortness of breath.      Impaired cilia: Extra mucus impairs cilia, causing congestion and wheezing. Smoking makes the problem worse.   Making a diagnosis  A physical exam, health history, and certain tests help your healthcare provider make the diagnosis.  Health history  Your healthcare provider will ask you about your symptoms.  The exam  Your provider listens to your chest for signs of congestion. He or she  may also check your ears, nose, and throat.  Possible tests  · A sputum test for bacteria. This requires a sample of mucus from your lungs.  · A nasal or throat swab. This tests to see if you have a bacterial infection.  · A chest X-ray. This is done if your healthcare provider thinks you have pneumonia.  · Tests to check for an underlying condition. Other tests may be done to check for things such as allergies, asthma, or COPD (chronic obstructive pulmonary disease). You may need to see a specialist for more lung function testing.  Treating a cough  The main treatment for bronchitis is easing symptoms. Avoiding smoke, allergens, and other things that trigger coughing can often help. If the infection is bacterial, you may be given antibiotics. During the illness, it's important to get plenty of sleep. To ease symptoms:  · Dont smoke. Also avoid secondhand smoke.  · Use a humidifier. Or try breathing in steam from a hot shower. This may help loosen mucus.  · Drink a lot of water and juice. They can soothe the throat and may help thin mucus.  · Sit up or use extra pillows when in bed. This helps to lessen coughing and congestion.  · Ask your provider about using medicine. Ask about using cough medicine, pain and fever medicine, or a decongestant.  Antibiotics  Most cases of bronchitis are caused by cold or flu viruses. They dont need antibiotics to treat them, even if your mucus is thick and green or yellow. Antibiotics dont treat viral illness and antibiotics have not been shown to have any benefit in cases of acute bronchitis. Taking antibiotics when they are not needed increases your risk of getting an infection later that is antibiotic-resistant. Antibiotics can also cause severe cases of diarrhea that require other antibiotics to treat.  It is important that you accept your healthcare provider's opinion to not use antibiotics. Your provider will prescribe antibiotics if the infection is caused by bacteria. If  they are prescribed:  · Take all of the medicine. Take the medicine until it is used up, even if symptoms have improved. If you dont, the bronchitis may come back.  · Take the medicines as directed. For instance, some medicines should be taken with food.  · Ask about side effects. Ask your provider or pharmacist what side effects are common, and what to do about them.  Follow-up care  You should see your provider again in 2 to 3 weeks. By this time, symptoms should have improved. An infection that lasts longer may mean you have a more serious problem.  Prevention  · Avoid tobacco smoke. If you smoke, quit. Stay away from smoky places. Ask friends and family not to smoke around you, or in your home or car.  · Get checked for allergies.  · Ask your provider about getting a yearly flu shot. Also ask about pneumococcal or pneumonia shots.  · Wash your hands often. This helps reduce the chance of picking up viruses that cause colds and flu.  Call your healthcare provider if:  · Symptoms worsen, or you have new symptoms  · Breathing problems worsen or  become severe  · Symptoms dont get better within a week, or within 3 days of taking antibiotics   Date Last Reviewed: 2/1/2017  © 3042-3597 [employer] [address] [place] [address], [place] [address]. All rights reserved. This information is not intended as a substitute for professional medical care. Always follow your healthcare professional's instructions.

## 2020-09-22 NOTE — PROGRESS NOTES
EST PATIENT VISIT FAMILY MEDICINE    CC:   Chief Complaint   Patient presents with    Cough       HPI: Nimesh Saini  is a 74 y.o. male:    Patient is known to me.    Patient had come in Friday requesting cough medicine. Still has cough which is productive but slightly better per patient. Denies having chronic cough/sputum. Had longterm second hand smoke exposure from his wife. No fever. His dyspnea is not worse than his baseline     HTN and CHF  Sees Cardiology; they recently changed his medicine  His lasix was decreased to 20mg by Cardiology (lasix)      ROS: Review of Systems   Constitutional: Negative for fever.   Respiratory: Positive for cough, sputum production and shortness of breath.        PMHX:   Past Medical History:   Diagnosis Date    Cardiomyopathy     ICM EF 15%    CHF (congestive heart failure)     chronic combined     Coronary artery disease     3 vessel    Diverticulitis large intestine 08/2014    GERD (gastroesophageal reflux disease)     Hypertension     Obesity        PSHX:   Past Surgical History:   Procedure Laterality Date    CARDIAC DEFIBRILLATOR PLACEMENT Left 09/2017    CORONARY ANGIOPLASTY WITH STENT PLACEMENT  06/2017    coronary angiogram; stents x 5    UMBILICAL HERNIA REPAIR      1990's       FAMHX:   Family History   Problem Relation Age of Onset    Diabetes Mother     Hypertension Father     Stroke Father     Asthma Neg Hx     Emphysema Neg Hx        SOCHX:   Social History     Socioeconomic History    Marital status:      Spouse name: Not on file    Number of children: Not on file    Years of education: Not on file    Highest education level: Not on file   Occupational History    Not on file   Social Needs    Financial resource strain: Not on file    Food insecurity     Worry: Not on file     Inability: Not on file    Transportation needs     Medical: Not on file     Non-medical: Not on file   Tobacco Use    Smoking status: Former Smoker      Packs/day: 0.25     Years: 10.00     Pack years: 2.50     Types: Cigarettes     Start date: 1963     Quit date: 1971     Years since quittin.7    Smokeless tobacco: Never Used   Substance and Sexual Activity    Alcohol use: No     Comment: socially    Drug use: No    Sexual activity: Not on file   Lifestyle    Physical activity     Days per week: Not on file     Minutes per session: Not on file    Stress: Not at all   Relationships    Social connections     Talks on phone: Not on file     Gets together: Not on file     Attends [employer] service: Not on file     Active member of club or organization: Not on file     Attends meetings of clubs or organizations: Not on file     Relationship status: Not on file   Other Topics Concern    Not on file   Social History Narrative    Retired large . Lives alone. [employer]. [place].         ALLERGIES:   Review of patient's allergies indicates:   Allergen Reactions    Atorvastatin Other (See Comments)       MEDS:   Current Outpatient Medications:     aspirin (ECOTRIN) 81 MG EC tablet, Take 1 tablet by mouth once daily., Disp: , Rfl:     cholecalciferol, vitamin D3, (VITAMIN D3) 50 mcg (2,000 unit) Cap, Take 1 capsule (2,000 Units total) by mouth once daily., Disp: 90 capsule, Rfl: 2    cyanocobalamin (VITAMIN B-12) 1000 MCG tablet, Take 1 tablet by mouth once daily., Disp: , Rfl:     metoprolol succinate (TOPROL-XL) 25 MG 24 hr tablet, Take 1 tablet by mouth once daily., Disp: , Rfl:     nitroGLYCERIN (NITROSTAT) 0.4 MG SL tablet, DIS 1 T UNT Q FIVE MINUTES UP TO THREE DOSES PRF CHEST PAIN, Disp: , Rfl: 3    pantoprazole (PROTONIX) 40 MG tablet, Take 1 tablet by mouth once daily., Disp: , Rfl:     sacubitriL-valsartan (ENTRESTO) 49-51 mg per tablet, Take 1 tablet by mouth once daily., Disp: , Rfl:     spironolactone (ALDACTONE) 25 MG tablet, Take 25 mg by mouth once daily., Disp: , Rfl:      "albuterol sulfate (PROAIR RESPICLICK) 90 mcg/actuation inhaler, Inhale 1-2 puffs into the lungs every 4 (four) hours as needed for Wheezing. Rescue, Disp: 1 each, Rfl: 0    azithromycin (Z-ANÍBAL) 250 MG tablet, Take 2 tablets by mouth on day 1; Take 1 tablet by mouth on days 2-5, Disp: 6 tablet, Rfl: 0    predniSONE (DELTASONE) 50 MG Tab, Take 1 tablet (50 mg total) by mouth once daily., Disp: 5 tablet, Rfl: 0    OBJECTIVE:   Vitals:    09/22/20 0938   BP: (!) 148/80   BP Location: Left arm   Patient Position: Sitting   BP Method: Large (Manual)   Pulse: 64   Resp: 18   Temp: 98.1 °F (36.7 °C)   SpO2: 97%   Weight: 119.3 kg (262 lb 14.4 oz)   Height: 6' 1" (1.854 m)     Body mass index is 34.69 kg/m².      Physical Exam  Vitals signs and nursing note reviewed.   Constitutional:       Appearance: Normal appearance.   HENT:      Head: Normocephalic and atraumatic.   Eyes:      General: No scleral icterus.     Extraocular Movements: Extraocular movements intact.   Cardiovascular:      Rate and Rhythm: Normal rate and regular rhythm.   Pulmonary:      Effort: Pulmonary effort is normal. No respiratory distress.      Breath sounds: No stridor. Wheezing and rhonchi present. No rales.   Chest:      Chest wall: No tenderness.   Neurological:      Mental Status: He is alert.   Psychiatric:         Behavior: Behavior normal.         Thought Content: Thought content normal.           LABS:   A1C:  Recent Labs   Lab 09/09/20  1104   Hemoglobin A1C 5.5     CBC:  Recent Labs   Lab 04/29/19  0919   WBC 5.57   RBC 4.71   Hemoglobin 14.9   Hematocrit 42.8   Platelets 148 L   Mean Corpuscular Volume 91   Mean Corpuscular Hemoglobin 31.6 H   Mean Corpuscular Hemoglobin Conc 34.8     CMP:  Recent Labs   Lab 12/25/18  1739  09/09/20  1104   Glucose 109   < > 110   Calcium 9.8   < > 9.9   Albumin 4.4  --   --    Total Protein 7.5  --   --    Sodium 144   < > 143   Potassium 4.7   < > 4.9   CO2 27   < > 32 H   Chloride 109   < > 104 "   BUN, Bld 24 H   < > 16   Creatinine 1.21   < > 1.26   Alkaline Phosphatase 79  --   --    ALT 19  --   --    AST 20  --   --    Total Bilirubin 1.5 H  --   --     < > = values in this interval not displayed.     LIPIDS:  Recent Labs   Lab 12/25/18  1739 08/03/20   TSH 1.810  --    HDL 62 42   Cholesterol 161 223 A   Triglycerides 100 117   LDL Cholesterol 79.0 180   Hdl/Cholesterol Ratio 38.5 5.3   Non-HDL Cholesterol 99  --    Total Cholesterol/HDL Ratio 2.6  --      TSH:  Recent Labs   Lab 12/25/18  1739   TSH 1.810         ASSESSMENT & PLAN:  Cough  -Zpak and prednisone x 5 days  -albuterol PRN        Hypertension  Uncontrolled.   BP goal less than 140/90  Continue medication(s) as prescribed.  Labs reviewed and up to date.   Follow up with Cardiology as scheduled        Combined systolic and diastolic congestive heart failure, NYHA class 3  Weight gradually decreasing  Able to tolerate lasix 20mg       Orders Placed This Encounter    predniSONE (DELTASONE) 50 MG Tab    azithromycin (Z-ANÍBAL) 250 MG tablet    albuterol sulfate (PROAIR RESPICLICK) 90 mcg/actuation inhaler       Follow up in about 1 week (around 9/29/2020), or if symptoms worsen or fail to improve.    RTC/ED precautions discussed where applicable.   Encouraged patient to let me know if there are any further questions/concerns.     Advise patient/caretaker to check with insurance regarding orders to avoid unexpected fees/costs.     The patient/caretaker indicates understanding of these issues and agrees with the plan.    Dr. Lian Foster MD  Family Medicine

## 2021-01-27 ENCOUNTER — TELEPHONE (OUTPATIENT)
Dept: FAMILY MEDICINE | Facility: CLINIC | Age: 76
End: 2021-01-27

## 2021-02-03 ENCOUNTER — TELEPHONE (OUTPATIENT)
Dept: FAMILY MEDICINE | Facility: CLINIC | Age: 76
End: 2021-02-03

## 2021-11-29 ENCOUNTER — TELEPHONE (OUTPATIENT)
Dept: FAMILY MEDICINE | Facility: CLINIC | Age: 76
End: 2021-11-29
Payer: MEDICARE

## 2022-11-03 ENCOUNTER — TELEPHONE (OUTPATIENT)
Dept: FAMILY MEDICINE | Facility: CLINIC | Age: 77
End: 2022-11-03
Payer: MEDICARE

## 2022-11-16 NOTE — TELEPHONE ENCOUNTER
Name: Anabell Damian ADMIT: 11/15/2022   : 1942  PCP: Jennifer Lauren MD    MRN: [id number] LOS: 1 days   AGE/SEX: 80 y.o. female  ROOM: S418/1   Subjective   Chief Complaint   Patient presents with   • Abnormal Lab      Ambulating well now. No muscle ache. No HA or neck pain. No CP or palpitation. No NVD or abdominal pain currently. No dyspnea. No dysuria.    She does not recall all events. Had syncopal episodes and  had noticed tensing and grimace during at least 1. Also says she had 'wide eyes.' She reports prior issue with vasovagal symptoms. Incontinence reported as well.  is on his way to the hospital. We discussed neurology consultation and further workup.    Objective   Vital Signs  Temp:  [97.8 °F (36.6 °C)-98.9 °F (37.2 °C)] 97.8 °F (36.6 °C)  Heart Rate:  [58-91] 58  Resp:  [17-18] 18  BP: (122-162)/(59-80) 162/80  SpO2:  [95 %-98 %] 98 %  on   ;      Body mass index is 24.68 kg/m².    Physical Exam  Vitals and nursing note reviewed.   Constitutional:       General: She is not in acute distress.     Appearance: She is not diaphoretic.   HENT:      Head: Normocephalic and atraumatic.   Eyes:      General:         Right eye: No discharge.         Left eye: No discharge.      Conjunctiva/sclera: Conjunctivae normal.      Pupils: Pupils are equal, round, and reactive to light.   Cardiovascular:      Rate and Rhythm: Normal rate and regular rhythm.      Pulses: Normal pulses.   Pulmonary:      Effort: Pulmonary effort is normal.      Breath sounds: No wheezing or rhonchi.   Abdominal:      General: There is no distension.      Palpations: Abdomen is soft.      Tenderness: There is no abdominal tenderness. There is no guarding or rebound.   Musculoskeletal:         General: No tenderness.      Cervical back: Neck supple. No tenderness.      Right lower leg: No edema.      Left lower leg: No edema.   Skin:     General: Skin is warm and dry.   Neurological:      Mental Status: She is alert  ----- Message from Staci Hampton sent at 6/21/2017  9:04 AM CDT -----  coordinator will return their call##      Patient would like to get a referral.   REFERRAL:   Referral to what specialty: pulmonary physician  Reason (be specific): trouble breathing  Does the patient want the referral with a specific physician:      ##Advise the patient that once the physician approves this either a nurse or referral      and oriented to person, place, and time.   Psychiatric:         Mood and Affect: Mood normal.         Behavior: Behavior normal.         Results Review:       I reviewed the patient's new clinical results.     I reviewed imaging, agree with interpretation.     I reviewed telemetry/EKG results, sinus, no heart block.      Results from last 7 days   Lab Units 11/16/22  0336 11/15/22  1457   WBC 10*3/mm3 6.20 9.15   HEMOGLOBIN g/dL 10.0* 11.6*   PLATELETS 10*3/mm3 225 266     Results from last 7 days   Lab Units 11/16/22  0336 11/15/22  1457   SODIUM mmol/L 136 135*   POTASSIUM mmol/L 4.3 4.3   CHLORIDE mmol/L 99 100   CO2 mmol/L 25.9 23.8   BUN mg/dL 18 26*   CREATININE mg/dL 0.76 1.07*   GLUCOSE mg/dL 122* 137*   Estimated Creatinine Clearance: 69.2 mL/min (by C-G formula based on SCr of 0.76 mg/dL).  Results from last 7 days   Lab Units 11/16/22  0336 11/15/22  1457   CALCIUM mg/dL 8.4* 9.7   ALBUMIN g/dL  --  4.20          vitamin C, 1,500 mg, Oral, Daily  cetirizine, 10 mg, Oral, Daily  cholecalciferol, 1,000 Units, Oral, Daily  famotidine, 40 mg, Oral, Nightly  lactobacillus acidophilus, 1 capsule, Oral, Daily  levothyroxine, 88 mcg, Oral, Q AM  magnesium (as) gluconate, 27 mg, Oral, Daily  spironolactone, 25 mg, Oral, Daily  cyanocobalamin, 500 mcg, Oral, Daily  vitamin B-6, 100 mg, Oral, Daily      sodium chloride, 75 mL/hr, Last Rate: 75 mL/hr (11/15/22 2148)    Diet Regular Texture (IDDSI 7); Regular Consistency; Cardiac Diets; Healthy Heart (2-3 Na+)    Assessment & Plan      Active Hospital Problems    Diagnosis  POA   • **Syncope and collapse [R55]  Yes      Resolved Hospital Problems   No resolved problems to display.       · Syncope: Potential seizure. Was on floor for several hours. MRI reviewed. Duplex pending. Ativan if needed. She has had improvement of CPK overnight and was ambulating well and off IVF. PT consult. Neurology consult.  · BPPV Hx and Vasovagal Hx  · GERD: Famotidine  · Hypothyroidism:  Synthroid  · Elevated Troponin: Outpatient lab at  was 206. It is a different scale than ours. Troponin negative here x2. No CP. Hx Aortic valve sclerosis. Echo pending. Will ask cardiology to review.  · HTN: Only aldactone as outpatient?  · PPx: SCD  · Disposision: Home/possibly tomorrow    Nicho Kapadia MD  [facility]  11/16/22  10:32 EST    Dictated portions using Dragon dictation software.    During the entire encounter, I was wearing recommended PPE including face mask and eye protection. Hand sanitization was performed prior to entering room and upon exit.

## 2022-12-23 ENCOUNTER — TELEPHONE (OUTPATIENT)
Dept: FAMILY MEDICINE | Facility: CLINIC | Age: 77
End: 2022-12-23
Payer: MEDICARE

## 2023-02-24 ENCOUNTER — PATIENT OUTREACH (OUTPATIENT)
Dept: ADMINISTRATIVE | Facility: HOSPITAL | Age: 78
End: 2023-02-24
Payer: MEDICARE

## 2025-01-06 PROBLEM — I50.23 ACUTE ON CHRONIC SYSTOLIC HEART FAILURE: Status: ACTIVE | Noted: 2025-01-06

## 2025-01-07 PROBLEM — N39.0 UTI (URINARY TRACT INFECTION): Status: ACTIVE | Noted: 2025-01-07

## 2025-01-07 PROBLEM — R06.02 SHORTNESS OF BREATH: Status: RESOLVED | Noted: 2017-05-02 | Resolved: 2025-01-07

## 2025-01-07 PROBLEM — J20.9 ACUTE BRONCHITIS: Status: ACTIVE | Noted: 2025-01-07

## 2025-01-08 PROBLEM — R06.02 SHORTNESS OF BREATH: Status: RESOLVED | Noted: 2017-05-02 | Resolved: 2025-01-08

## 2025-05-16 ENCOUNTER — HOSPITAL ENCOUNTER (INPATIENT)
Facility: HOSPITAL | Age: 80
LOS: 7 days | Discharge: NURSING, REHAB OR LONG-TERM CARE | DRG: 177 | End: 2025-05-25
Attending: STUDENT IN AN ORGANIZED HEALTH CARE EDUCATION/TRAINING PROGRAM | Admitting: FAMILY MEDICINE
Payer: MEDICARE

## 2025-05-16 DIAGNOSIS — I10 PRIMARY HYPERTENSION: Chronic | ICD-10-CM

## 2025-05-16 DIAGNOSIS — R55 SYNCOPE AND COLLAPSE: ICD-10-CM

## 2025-05-16 DIAGNOSIS — I50.42 CHRONIC COMBINED SYSTOLIC AND DIASTOLIC CONGESTIVE HEART FAILURE, NYHA CLASS 3: Chronic | ICD-10-CM

## 2025-05-16 DIAGNOSIS — R41.0 DISORIENTATION: ICD-10-CM

## 2025-05-16 DIAGNOSIS — G93.40 ACUTE ENCEPHALOPATHY: Primary | ICD-10-CM

## 2025-05-16 DIAGNOSIS — I25.10 CAD, MULTIPLE VESSEL: ICD-10-CM

## 2025-05-16 DIAGNOSIS — Z86.79 HISTORY OF CARDIOMYOPATHY: ICD-10-CM

## 2025-05-16 DIAGNOSIS — R79.89 TROPONIN LEVEL ELEVATED: ICD-10-CM

## 2025-05-16 DIAGNOSIS — R55 SYNCOPE: ICD-10-CM

## 2025-05-16 DIAGNOSIS — Z95.810 AICD (AUTOMATIC CARDIOVERTER/DEFIBRILLATOR) PRESENT: ICD-10-CM

## 2025-05-17 PROBLEM — E87.20 ACIDOSIS, METABOLIC: Status: ACTIVE | Noted: 2025-05-17

## 2025-05-17 PROBLEM — R41.82 ALTERED MENTAL STATE: Status: ACTIVE | Noted: 2025-05-17

## 2025-05-17 PROBLEM — E87.1 HYPONATREMIA: Status: ACTIVE | Noted: 2025-05-17

## 2025-05-17 LAB
ABSOLUTE EOSINOPHIL (OHS): 0 K/UL
ABSOLUTE MONOCYTE (OHS): 0.62 K/UL (ref 0.3–1)
ABSOLUTE NEUTROPHIL COUNT (OHS): 6.66 K/UL (ref 1.8–7.7)
ALBUMIN SERPL BCP-MCNC: 3.6 G/DL (ref 3.5–5.2)
ALP SERPL-CCNC: 58 UNIT/L (ref 40–150)
ALT SERPL W/O P-5'-P-CCNC: 40 UNIT/L (ref 10–44)
AMMONIA PLAS-SCNC: 31 UMOL/L (ref 10–50)
AMPHET UR QL SCN: NEGATIVE
ANION GAP (OHS): 11 MMOL/L (ref 8–16)
ANION GAP (OHS): 13 MMOL/L (ref 8–16)
AORTIC SIZE INDEX (SOV): 1.4 CM/M2
AORTIC SIZE INDEX: 1.2 CM/M2
APICAL FOUR CHAMBER EJECTION FRACTION: 42 %
APICAL TWO CHAMBER EJECTION FRACTION: 43 %
APTT PPP: 28.2 SECONDS (ref 21–32)
ASCENDING AORTA: 2.8 CM
AST SERPL-CCNC: 67 UNIT/L (ref 11–45)
AV INDEX (PROSTH): 0.31
AV MEAN GRADIENT: 14 MMHG
AV PEAK GRADIENT: 23 MMHG
AV REGURGITATION PRESSURE HALF TIME: 744 MS
AV VALVE AREA BY VELOCITY RATIO: 1.5 CM²
AV VALVE AREA: 1.4 CM²
AV VELOCITY RATIO: 0.33
BACTERIA #/AREA URNS AUTO: NORMAL /HPF
BARBITURATE SCN PRESENT UR: NEGATIVE
BASOPHILS # BLD AUTO: 0.01 K/UL
BASOPHILS NFR BLD AUTO: 0.1 %
BENZODIAZ UR QL SCN: NEGATIVE
BILIRUB SERPL-MCNC: 0.8 MG/DL (ref 0.1–1)
BILIRUB UR QL STRIP.AUTO: NEGATIVE
BNP SERPL-MCNC: 130 PG/ML (ref 0–99)
BSA FOR ECHO PROCEDURE: 2.46 M2
BUN SERPL-MCNC: 24 MG/DL (ref 8–23)
BUN SERPL-MCNC: 24 MG/DL (ref 8–23)
CALCIUM SERPL-MCNC: 8.5 MG/DL (ref 8.7–10.5)
CALCIUM SERPL-MCNC: 8.7 MG/DL (ref 8.7–10.5)
CANNABINOIDS UR QL SCN: NEGATIVE
CHLORIDE SERPL-SCNC: 105 MMOL/L (ref 95–110)
CHLORIDE SERPL-SCNC: 105 MMOL/L (ref 95–110)
CLARITY UR: CLEAR
CO2 SERPL-SCNC: 17 MMOL/L (ref 23–29)
CO2 SERPL-SCNC: 19 MMOL/L (ref 23–29)
COCAINE UR QL SCN: NEGATIVE
COLOR UR AUTO: YELLOW
CREAT SERPL-MCNC: 1.2 MG/DL (ref 0.5–1.4)
CREAT SERPL-MCNC: 1.4 MG/DL (ref 0.5–1.4)
CREAT UR-MCNC: 151.6 MG/DL (ref 23–375)
CV ECHO LV RWT: 0.21 CM
DOP CALC AO PEAK VEL: 2.4 M/S
DOP CALC AO VTI: 49.3 CM
DOP CALC LVOT AREA: 4.5 CM2
DOP CALC LVOT DIAMETER: 2.4 CM
DOP CALC LVOT PEAK VEL: 0.8 M/S
DOP CALC LVOT STROKE VOLUME: 69.2 CM3
DOP CALC MV VTI: 37.1 CM
DOP CALCLVOT PEAK VEL VTI: 15.3 CM
E WAVE DECELERATION TIME: 225 MSEC
E/A RATIO: 0.7
E/E' RATIO: 15 M/S
ECHO LV POSTERIOR WALL: 0.7 CM (ref 0.6–1.1)
ERYTHROCYTE [DISTWIDTH] IN BLOOD BY AUTOMATED COUNT: 13 % (ref 11.5–14.5)
ETHANOL SERPL-MCNC: <10 MG/DL
FIO2: 21 %
FIO2: 21 %
FRACTIONAL SHORTENING: 17.9 % (ref 28–44)
GFR SERPLBLD CREATININE-BSD FMLA CKD-EPI: 51 ML/MIN/1.73/M2
GFR SERPLBLD CREATININE-BSD FMLA CKD-EPI: >60 ML/MIN/1.73/M2
GLUCOSE SERPL-MCNC: 89 MG/DL (ref 70–110)
GLUCOSE SERPL-MCNC: 93 MG/DL (ref 70–110)
GLUCOSE UR QL STRIP: ABNORMAL
HCT VFR BLD AUTO: 47.2 % (ref 40–54)
HGB BLD-MCNC: 16.4 GM/DL (ref 14–18)
HGB UR QL STRIP: NEGATIVE
HOLD SPECIMEN: NORMAL
HOLD SPECIMEN: NORMAL
HYALINE CASTS UR QL AUTO: 1 /LPF (ref 0–1)
IMM GRANULOCYTES # BLD AUTO: 0.04 K/UL (ref 0–0.04)
IMM GRANULOCYTES NFR BLD AUTO: 0.5 % (ref 0–0.5)
INR PPP: 1 (ref 0.8–1.2)
INTERVENTRICULAR SEPTUM: 1.1 CM (ref 0.6–1.1)
IVC DIAMETER: 1.64 CM
IVRT: 114 MSEC
KETONES UR QL STRIP: ABNORMAL
LACTATE SERPL-SCNC: 1 MMOL/L (ref 0.5–2.2)
LEFT ATRIUM AREA SYSTOLIC (APICAL 2 CHAMBER): 20.73 CM2
LEFT ATRIUM AREA SYSTOLIC (APICAL 4 CHAMBER): 16.53 CM2
LEFT ATRIUM VOLUME INDEX MOD: 22 ML/M2
LEFT ATRIUM VOLUME MOD: 54 ML
LEFT INTERNAL DIMENSION IN SYSTOLE: 5.5 CM (ref 2.1–4)
LEFT VENTRICLE DIASTOLIC VOLUME INDEX: 95.44 ML/M2
LEFT VENTRICLE DIASTOLIC VOLUME: 230 ML
LEFT VENTRICLE END DIASTOLIC VOLUME APICAL 2 CHAMBER: 268.64 ML
LEFT VENTRICLE END DIASTOLIC VOLUME APICAL 4 CHAMBER: 242.14 ML
LEFT VENTRICLE END SYSTOLIC VOLUME APICAL 2 CHAMBER: 64.37 ML
LEFT VENTRICLE END SYSTOLIC VOLUME APICAL 4 CHAMBER: 42.55 ML
LEFT VENTRICLE MASS INDEX: 108.4 G/M2
LEFT VENTRICLE SYSTOLIC VOLUME INDEX: 60.6 ML/M2
LEFT VENTRICLE SYSTOLIC VOLUME: 146 ML
LEFT VENTRICULAR INTERNAL DIMENSION IN DIASTOLE: 6.7 CM (ref 3.5–6)
LEFT VENTRICULAR MASS: 261.3 G
LEUKOCYTE ESTERASE UR QL STRIP: NEGATIVE
LV LATERAL E/E' RATIO: 14.5 M/S
LV SEPTAL E/E' RATIO: 14.5 M/S
LVED V (TEICH): 229.81 ML
LVES V (TEICH): 145.87 ML
LVOT MG: 1.56 MMHG
LVOT MV: 0.6 CM/S
LYMPHOCYTES # BLD AUTO: 0.4 K/UL (ref 1–4.8)
MCH RBC QN AUTO: 31.4 PG (ref 27–31)
MCHC RBC AUTO-ENTMCNC: 34.7 G/DL (ref 32–36)
MCV RBC AUTO: 90 FL (ref 82–98)
METHADONE UR QL SCN: NEGATIVE
MICROSCOPIC COMMENT: NORMAL
MV A" WAVE DURATION": 117.98 MSEC
MV MEAN GRADIENT: 1 MMHG
MV PEAK A VEL: 0.83 M/S
MV PEAK E VEL: 0.58 M/S
MV PEAK GRADIENT: 4 MMHG
MV STENOSIS PRESSURE HALF TIME: 65.22 MS
MV VALVE AREA BY CONTINUITY EQUATION: 1.86 CM2
MV VALVE AREA P 1/2 METHOD: 3.37 CM2
NITRITE UR QL STRIP: NEGATIVE
NUCLEATED RBC (/100WBC) (OHS): 0 /100 WBC
OHS CV RV/LV RATIO: 0.72 CM
OHS LV EJECTION FRACTION SIMPSONS BIPLANE MOD: 42 %
OPIATES UR QL SCN: NEGATIVE
PCO2 BLDA: 38.8 MMHG (ref 35–45)
PCP UR QL: NEGATIVE
PH SMN: 7.34 [PH] (ref 7.35–7.45)
PH UR STRIP: 5 [PH]
PISA AR MAX VEL: 3.79 M/S
PISA MRMAX VEL: 3.35 M/S
PISA TR MAX VEL: 3.1 M/S
PLATELET # BLD AUTO: 98 K/UL (ref 150–450)
PLATELET BLD QL SMEAR: ABNORMAL
PMV BLD AUTO: 11.4 FL (ref 9.2–12.9)
PO2 BLDA: 23.4 MMHG (ref 40–60)
POC BASE DEFICIT: -4.6 MMOL/L (ref -2–2)
POC COHB: 1.2 % (ref 0–9)
POC HCO3: 20.8 MMOL/L (ref 24–28)
POC METHB: 0.9 % (ref 0–3)
POC O2HB: 43.8 %
POC PERFORMED BY: ABNORMAL
POC PERFORMED BY: ABNORMAL
POC SATURATED O2: 35.4 % (ref 95–100)
POC SATURATED O2: 44.7 % (ref 95–100)
POTASSIUM SERPL-SCNC: 4.9 MMOL/L (ref 3.5–5.1)
POTASSIUM SERPL-SCNC: 5.1 MMOL/L (ref 3.5–5.1)
PROT SERPL-MCNC: 7.1 GM/DL (ref 6–8.4)
PROT UR QL STRIP: ABNORMAL
PROTHROMBIN TIME: 11.9 SECONDS (ref 9–12.5)
PV PEAK GRADIENT: 5 MMHG
PV PEAK VELOCITY: 1.11 M/S
RA PRESSURE ESTIMATED: 3 MMHG
RA VOL SYS: 62.12 ML
RBC # BLD AUTO: 5.23 M/UL (ref 4.6–6.2)
RBC #/AREA URNS AUTO: 1 /HPF (ref 0–4)
RELATIVE EOSINOPHIL (OHS): 0 %
RELATIVE LYMPHOCYTE (OHS): 5.2 % (ref 18–48)
RELATIVE MONOCYTE (OHS): 8 % (ref 4–15)
RELATIVE NEUTROPHIL (OHS): 86.2 % (ref 38–73)
RIGHT ATRIAL AREA: 21.2 CM2
RIGHT ATRIUM VOLUME AREA LENGTH APICAL 4 CHAMBER: 56.91 ML
RIGHT VENTRICLE DIASTOLIC BASEL DIMENSION: 4.8 CM
RV TB RVSP: 6 MMHG
RV TISSUE DOPPLER FREE WALL SYSTOLIC VELOCITY 1 (APICAL 4 CHAMBER VIEW): 15.93 CM/S
SINUS: 3.35 CM
SODIUM SERPL-SCNC: 133 MMOL/L (ref 136–145)
SODIUM SERPL-SCNC: 137 MMOL/L (ref 136–145)
SP GR UR STRIP: 1.02
SPECIMEN SOURCE: ABNORMAL
SPECIMEN SOURCE: ABNORMAL
SQUAMOUS #/AREA URNS AUTO: <1 /HPF
STJ: 3.1 CM
TDI LATERAL: 0.04 M/S
TDI SEPTAL: 0.04 M/S
TDI: 0.04 M/S
TR MAX PG: 38 MMHG
TRICUSPID ANNULAR PLANE SYSTOLIC EXCURSION: 2.9 CM
TROPONIN I SERPL DL<=0.01 NG/ML-MCNC: 0.04 NG/ML
TROPONIN I SERPL DL<=0.01 NG/ML-MCNC: 0.07 NG/ML
TSH SERPL-ACNC: 1.54 UIU/ML (ref 0.4–4)
TV REST PULMONARY ARTERY PRESSURE: 41 MMHG
UROBILINOGEN UR STRIP-ACNC: NEGATIVE EU/DL
WBC # BLD AUTO: 7.73 K/UL (ref 3.9–12.7)
WBC #/AREA URNS AUTO: 2 /HPF (ref 0–5)
YEAST UR QL AUTO: NORMAL /HPF
Z-SCORE OF LEFT VENTRICULAR DIMENSION IN END DIASTOLE: -5.12
Z-SCORE OF LEFT VENTRICULAR DIMENSION IN END SYSTOLE: -1.55

## 2025-05-17 PROCEDURE — G0378 HOSPITAL OBSERVATION PER HR: HCPCS

## 2025-05-17 PROCEDURE — 83050 HGB METHEMOGLOBIN QUAN: CPT

## 2025-05-17 PROCEDURE — 82077 ASSAY SPEC XCP UR&BREATH IA: CPT | Performed by: STUDENT IN AN ORGANIZED HEALTH CARE EDUCATION/TRAINING PROGRAM

## 2025-05-17 PROCEDURE — 83605 ASSAY OF LACTIC ACID: CPT | Performed by: INTERNAL MEDICINE

## 2025-05-17 PROCEDURE — 80053 COMPREHEN METABOLIC PANEL: CPT | Performed by: STUDENT IN AN ORGANIZED HEALTH CARE EDUCATION/TRAINING PROGRAM

## 2025-05-17 PROCEDURE — 25000003 PHARM REV CODE 250: Performed by: STUDENT IN AN ORGANIZED HEALTH CARE EDUCATION/TRAINING PROGRAM

## 2025-05-17 PROCEDURE — 82375 ASSAY CARBOXYHB QUANT: CPT | Performed by: STUDENT IN AN ORGANIZED HEALTH CARE EDUCATION/TRAINING PROGRAM

## 2025-05-17 PROCEDURE — 82140 ASSAY OF AMMONIA: CPT | Performed by: INTERNAL MEDICINE

## 2025-05-17 PROCEDURE — 85610 PROTHROMBIN TIME: CPT | Performed by: STUDENT IN AN ORGANIZED HEALTH CARE EDUCATION/TRAINING PROGRAM

## 2025-05-17 PROCEDURE — 25000003 PHARM REV CODE 250: Performed by: INTERNAL MEDICINE

## 2025-05-17 PROCEDURE — 85025 COMPLETE CBC W/AUTO DIFF WBC: CPT | Performed by: STUDENT IN AN ORGANIZED HEALTH CARE EDUCATION/TRAINING PROGRAM

## 2025-05-17 PROCEDURE — 83880 ASSAY OF NATRIURETIC PEPTIDE: CPT | Performed by: STUDENT IN AN ORGANIZED HEALTH CARE EDUCATION/TRAINING PROGRAM

## 2025-05-17 PROCEDURE — 99900035 HC TECH TIME PER 15 MIN (STAT)

## 2025-05-17 PROCEDURE — 25500020 PHARM REV CODE 255: Performed by: INTERNAL MEDICINE

## 2025-05-17 PROCEDURE — 84443 ASSAY THYROID STIM HORMONE: CPT | Performed by: STUDENT IN AN ORGANIZED HEALTH CARE EDUCATION/TRAINING PROGRAM

## 2025-05-17 PROCEDURE — 99285 EMERGENCY DEPT VISIT HI MDM: CPT | Mod: 25

## 2025-05-17 PROCEDURE — 95819 EEG AWAKE AND ASLEEP: CPT

## 2025-05-17 PROCEDURE — 82375 ASSAY CARBOXYHB QUANT: CPT

## 2025-05-17 PROCEDURE — 84484 ASSAY OF TROPONIN QUANT: CPT | Performed by: STUDENT IN AN ORGANIZED HEALTH CARE EDUCATION/TRAINING PROGRAM

## 2025-05-17 PROCEDURE — 80307 DRUG TEST PRSMV CHEM ANLYZR: CPT | Performed by: STUDENT IN AN ORGANIZED HEALTH CARE EDUCATION/TRAINING PROGRAM

## 2025-05-17 PROCEDURE — 85730 THROMBOPLASTIN TIME PARTIAL: CPT | Performed by: STUDENT IN AN ORGANIZED HEALTH CARE EDUCATION/TRAINING PROGRAM

## 2025-05-17 PROCEDURE — 81001 URINALYSIS AUTO W/SCOPE: CPT | Mod: XB | Performed by: STUDENT IN AN ORGANIZED HEALTH CARE EDUCATION/TRAINING PROGRAM

## 2025-05-17 PROCEDURE — 96372 THER/PROPH/DIAG INJ SC/IM: CPT | Performed by: INTERNAL MEDICINE

## 2025-05-17 PROCEDURE — 95819 EEG AWAKE AND ASLEEP: CPT | Mod: 26,,, | Performed by: PSYCHIATRY & NEUROLOGY

## 2025-05-17 PROCEDURE — 99223 1ST HOSP IP/OBS HIGH 75: CPT | Mod: 25,,, | Performed by: INTERNAL MEDICINE

## 2025-05-17 PROCEDURE — 84484 ASSAY OF TROPONIN QUANT: CPT | Mod: 91 | Performed by: INTERNAL MEDICINE

## 2025-05-17 PROCEDURE — 63600175 PHARM REV CODE 636 W HCPCS: Performed by: INTERNAL MEDICINE

## 2025-05-17 RX ORDER — SIMETHICONE 80 MG
1 TABLET,CHEWABLE ORAL 4 TIMES DAILY PRN
Status: DISCONTINUED | OUTPATIENT
Start: 2025-05-17 | End: 2025-05-25 | Stop reason: HOSPADM

## 2025-05-17 RX ORDER — ENOXAPARIN SODIUM 100 MG/ML
40 INJECTION SUBCUTANEOUS EVERY 24 HOURS
Status: DISCONTINUED | OUTPATIENT
Start: 2025-05-17 | End: 2025-05-25 | Stop reason: HOSPADM

## 2025-05-17 RX ORDER — IBUPROFEN 200 MG
16 TABLET ORAL
Status: DISCONTINUED | OUTPATIENT
Start: 2025-05-17 | End: 2025-05-25 | Stop reason: HOSPADM

## 2025-05-17 RX ORDER — SODIUM,POTASSIUM PHOSPHATES 280-250MG
2 POWDER IN PACKET (EA) ORAL
Status: DISCONTINUED | OUTPATIENT
Start: 2025-05-17 | End: 2025-05-21

## 2025-05-17 RX ORDER — GLUCAGON 1 MG
1 KIT INJECTION
Status: DISCONTINUED | OUTPATIENT
Start: 2025-05-17 | End: 2025-05-25 | Stop reason: HOSPADM

## 2025-05-17 RX ORDER — TALC
6 POWDER (GRAM) TOPICAL NIGHTLY PRN
Status: DISCONTINUED | OUTPATIENT
Start: 2025-05-17 | End: 2025-05-25 | Stop reason: HOSPADM

## 2025-05-17 RX ORDER — SPIRONOLACTONE 25 MG/1
25 TABLET ORAL DAILY
Status: DISCONTINUED | OUTPATIENT
Start: 2025-05-17 | End: 2025-05-25 | Stop reason: HOSPADM

## 2025-05-17 RX ORDER — METOPROLOL SUCCINATE 50 MG/1
100 TABLET, EXTENDED RELEASE ORAL DAILY
Status: DISCONTINUED | OUTPATIENT
Start: 2025-05-17 | End: 2025-05-19

## 2025-05-17 RX ORDER — ONDANSETRON HYDROCHLORIDE 2 MG/ML
4 INJECTION, SOLUTION INTRAVENOUS EVERY 8 HOURS PRN
Status: DISCONTINUED | OUTPATIENT
Start: 2025-05-17 | End: 2025-05-25 | Stop reason: HOSPADM

## 2025-05-17 RX ORDER — CHOLECALCIFEROL (VITAMIN D3) 50 MCG
2000 TABLET ORAL DAILY
Status: ON HOLD | COMMUNITY
Start: 2025-03-12

## 2025-05-17 RX ORDER — LANOLIN ALCOHOL/MO/W.PET/CERES
800 CREAM (GRAM) TOPICAL
Status: DISCONTINUED | OUTPATIENT
Start: 2025-05-17 | End: 2025-05-21

## 2025-05-17 RX ORDER — NALOXONE HCL 0.4 MG/ML
0.02 VIAL (ML) INJECTION
Status: DISCONTINUED | OUTPATIENT
Start: 2025-05-17 | End: 2025-05-25 | Stop reason: HOSPADM

## 2025-05-17 RX ORDER — ACETAMINOPHEN 325 MG/1
650 TABLET ORAL EVERY 4 HOURS PRN
Status: DISCONTINUED | OUTPATIENT
Start: 2025-05-17 | End: 2025-05-25 | Stop reason: HOSPADM

## 2025-05-17 RX ORDER — IBUPROFEN 200 MG
24 TABLET ORAL
Status: DISCONTINUED | OUTPATIENT
Start: 2025-05-17 | End: 2025-05-25 | Stop reason: HOSPADM

## 2025-05-17 RX ORDER — FLUTICASONE PROPIONATE 50 MCG
1 SPRAY, SUSPENSION (ML) NASAL 2 TIMES DAILY
Status: ON HOLD | COMMUNITY
Start: 2024-12-04

## 2025-05-17 RX ORDER — CLOPIDOGREL BISULFATE 75 MG/1
75 TABLET ORAL DAILY
Status: DISCONTINUED | OUTPATIENT
Start: 2025-05-18 | End: 2025-05-25 | Stop reason: HOSPADM

## 2025-05-17 RX ORDER — ASPIRIN 81 MG/1
81 TABLET ORAL EVERY OTHER DAY
Status: DISCONTINUED | OUTPATIENT
Start: 2025-05-19 | End: 2025-05-25 | Stop reason: HOSPADM

## 2025-05-17 RX ORDER — GUAIFENESIN 100 MG/5ML
200 LIQUID ORAL EVERY 4 HOURS PRN
Status: DISCONTINUED | OUTPATIENT
Start: 2025-05-17 | End: 2025-05-25 | Stop reason: HOSPADM

## 2025-05-17 RX ORDER — ALUMINUM HYDROXIDE, MAGNESIUM HYDROXIDE, AND SIMETHICONE 1200; 120; 1200 MG/30ML; MG/30ML; MG/30ML
30 SUSPENSION ORAL 4 TIMES DAILY PRN
Status: DISCONTINUED | OUTPATIENT
Start: 2025-05-17 | End: 2025-05-25 | Stop reason: HOSPADM

## 2025-05-17 RX ORDER — METOPROLOL SUCCINATE 200 MG/1
100 TABLET, EXTENDED RELEASE ORAL DAILY
Status: ON HOLD | COMMUNITY
Start: 2025-03-12 | End: 2025-05-25

## 2025-05-17 RX ORDER — PANTOPRAZOLE SODIUM 40 MG/1
40 TABLET, DELAYED RELEASE ORAL DAILY
Status: DISCONTINUED | OUTPATIENT
Start: 2025-05-17 | End: 2025-05-25 | Stop reason: HOSPADM

## 2025-05-17 RX ORDER — LANOLIN ALCOHOL/MO/W.PET/CERES
1000 CREAM (GRAM) TOPICAL DAILY
Status: DISCONTINUED | OUTPATIENT
Start: 2025-05-17 | End: 2025-05-25 | Stop reason: HOSPADM

## 2025-05-17 RX ORDER — SODIUM CHLORIDE 0.9 % (FLUSH) 0.9 %
10 SYRINGE (ML) INJECTION EVERY 12 HOURS PRN
Status: DISCONTINUED | OUTPATIENT
Start: 2025-05-17 | End: 2025-05-25 | Stop reason: HOSPADM

## 2025-05-17 RX ORDER — SODIUM CHLORIDE 9 MG/ML
500 INJECTION, SOLUTION INTRAVENOUS
Status: COMPLETED | OUTPATIENT
Start: 2025-05-17 | End: 2025-05-17

## 2025-05-17 RX ADMIN — ENOXAPARIN SODIUM 40 MG: 40 INJECTION SUBCUTANEOUS at 05:05

## 2025-05-17 RX ADMIN — MELATONIN TAB 3 MG 6 MG: 3 TAB at 10:05

## 2025-05-17 RX ADMIN — SACUBITRIL AND VALSARTAN 1 TABLET: 97; 103 TABLET, FILM COATED ORAL at 10:05

## 2025-05-17 RX ADMIN — GUAIFENESIN 200 MG: 100 SOLUTION ORAL at 10:05

## 2025-05-17 RX ADMIN — CYANOCOBALAMIN TAB 1000 MCG 1000 MCG: 1000 TAB at 08:05

## 2025-05-17 RX ADMIN — PANTOPRAZOLE SODIUM 40 MG: 40 TABLET, DELAYED RELEASE ORAL at 08:05

## 2025-05-17 RX ADMIN — SODIUM CHLORIDE 500 ML: 9 INJECTION, SOLUTION INTRAVENOUS at 02:05

## 2025-05-17 RX ADMIN — HUMAN ALBUMIN MICROSPHERES AND PERFLUTREN 0.11 MG: 10; .22 INJECTION, SOLUTION INTRAVENOUS at 09:05

## 2025-05-17 NOTE — ASSESSMENT & PLAN NOTE
Unclear etiology other than found down  -renal function at baseline  -Non anion gap  -repeat lactate and bmp  -not a known diabetic

## 2025-05-17 NOTE — PLAN OF CARE
Inpatient Upgrade Note    Nimesh Saini has warranted treatment spanning two or more midnights of hospital level care for the management of Altered mental Status. He continues to require further testing/imaging. His condition is also complicated by the following comorbidities: Heart failure and Metabolic Acidosis.

## 2025-05-17 NOTE — ASSESSMENT & PLAN NOTE
Patient has Combined Systolic and Diastolic heart failure that is Chronic. On presentation their CHF was well compensated. Most recent BNP and echo results are listed below.  Recent Labs     05/17/25  0014   *     Latest ECHO  Results for orders placed during the hospital encounter of 01/05/25    Echo    Interpretation Summary    Left Ventricle: The left ventricle is normal in size. There is concentric hypertrophy. Regional wall motion abnormalities present. See diagram for wall motion findings. There is reduced systolic function. Quantitated ejection fraction is 36%. Unable to assess diastolic function due to poor image quality.    Right Ventricle: Normal right ventricular cavity size. Systolic function is normal. Pacemaker lead present in the ventricle.    Aortic Valve: There is aortic valve sclerosis. Mildly restricted motion. There is mild aortic regurgitation.    Mitral Valve: There is mild regurgitation.    Pulmonary Artery: The estimated pulmonary artery systolic pressure is 19 mmHg.    IVC/SVC: Normal venous pressure at 3 mmHg.    Current Heart Failure Medications  metoprolol succinate (TOPROL-XL) 24 hr tablet 100 mg, Daily, Oral  sacubitriL-valsartan  mg per tablet 1 tablet, 2 times daily, Oral  spironolactone tablet 25 mg, Daily, Oral    Plan  - Monitor strict I&Os and daily weights.    - Place on telemetry  - Low sodium diet  - Place on fluid restriction of 2 L.   - Cardiology has not been consulted  - The patient's volume status is at their baseline  - slight LE edema that is new with elevated BNP. In the setting of AMS and found down, will hold on diuresis pending echo.   -cont entresto, metoprolol, spironolactone

## 2025-05-17 NOTE — ED NOTES
Sitting up at bedside request bathroom. Assisted with urinal, urine specimen obtained and sent to the lab.

## 2025-05-17 NOTE — PHARMACY MED REC
"  [facility]           Pharmacy  Admission Medication History     The home medication history was taken by Fabiana Barcenas.      Medication history obtained from Medications listed below were obtained from: Patient/family;Medications verified per patient and [facility] med list    Based on information gathered for medication list, you may go to "Admission" then "Reconcile Home Medications" tabs to review and/or act upon those items.     The home medication list has been updated by the Pharmacy department.   Please read ALL comments highlighted in yellow.   Please address this information as you see fit.    Feel free to contact us if you have any questions or require assistance.      No current facility-administered medications on file prior to encounter.     Current Outpatient Medications on File Prior to Encounter   Medication Sig Dispense Refill    alirocumab (PRALUENT PEN) 75 mg/mL PnIj Inject 75 mg into the skin every 14 (fourteen) days.      cholecalciferol, vitamin D3, (VITAMIN D3) 50 mcg (2,000 unit) Tab Take 2,000 Units by mouth once daily.      clopidogreL (PLAVIX) 75 mg tablet Take 75 mg by mouth once daily.      cyanocobalamin (VITAMIN B-12) 1000 MCG tablet Take 1,000 mcg by mouth once daily.      empagliflozin (JARDIANCE) 25 mg tablet Take 12.5 mg by mouth once daily.      fluticasone propionate (FLONASE) 50 mcg/actuation nasal spray 1 spray by Each Nostril route 2 (two) times a day.      metoprolol succinate (TOPROL-XL) 200 MG 24 hr tablet Take 100 mg by mouth once daily.      multivitamin with minerals Cap Take 1 capsule by mouth once daily.      pantoprazole (PROTONIX) 40 MG tablet Take 40 mg by mouth once daily.      sacubitriL-valsartan (ENTRESTO)  mg per tablet Take 1 tablet by mouth 2 (two) times daily.      spironolactone (ALDACTONE) 25 MG tablet Take 25 mg by mouth once daily.         Please address this information as you see fit.  Feel free to contact us if you have any " questions or require assistance.    Fabiana Barcenas  [phone]                .

## 2025-05-17 NOTE — CONSULTS
[facility] - Emergency Dept  Cardiology  Consult Note    Patient Name: Nimesh Saini  MRN: [id number]  Admission Date: 5/16/2025  Hospital Length of Stay: 0 days  Code Status: DNR   Attending Provider: Terrence Ornelas MD   Consulting Provider: Augusto Nelson MD  Primary Care Physician: No primary care provider on file.  Principal Problem:Altered mental state    Patient information was obtained from patient, past medical records, and ER records.     Inpatient consult to [facility]  Consult performed by: Augusto Nelson MD  Consult ordered by: Terrence Ornelas MD  Reason for consult: Syncope        Subjective:     Chief Complaint:  Syncope, bilateral lower extremity swelling     HPI:     Patient is a 79-year-old male, DNR code status, history of ischemic cardiomyopathy with EF of around 15-20% in the past, CAD with  of the LAD, distal left circumflex and RCA disease in 2018, history of PCI of the RCA, AICD in place, admitted to the hospital with altered mental status/possible syncope.  No AICD shocks.  No significant ST segment changes on EKG.  Denies chest pain.  Cardiology consulted for further recommendations.      Past Medical History:   Diagnosis Date    Cardiomyopathy     ICM EF 15%    CHF (congestive heart failure)     chronic combined     Coronary artery disease     3 vessel    Diverticulitis large intestine 08/2014    GERD (gastroesophageal reflux disease)     Hypertension     Obesity        Past Surgical History:   Procedure Laterality Date    CARDIAC DEFIBRILLATOR PLACEMENT Left 09/2017    CORONARY ANGIOPLASTY WITH STENT PLACEMENT  06/2017    coronary angiogram; stents x 5    UMBILICAL HERNIA REPAIR      1990's       Review of patient's allergies indicates:   Allergen Reactions    Atorvastatin Other (See Comments)       No current facility-administered medications on file prior to encounter.     Current Outpatient Medications on File Prior to Encounter   Medication Sig    alirocumab (PRALUENT PEN) 75  mg/mL PnIj Inject 75 mg into the skin every 14 (fourteen) days.    cholecalciferol, vitamin D3, (VITAMIN D3) 50 mcg (2,000 unit) Tab Take 2,000 Units by mouth once daily.    clopidogreL (PLAVIX) 75 mg tablet Take 75 mg by mouth once daily.    cyanocobalamin (VITAMIN B-12) 1000 MCG tablet Take 1,000 mcg by mouth once daily.    empagliflozin (JARDIANCE) 25 mg tablet Take 12.5 mg by mouth once daily.    fluticasone propionate (FLONASE) 50 mcg/actuation nasal spray 1 spray by Each Nostril route 2 (two) times a day.    metoprolol succinate (TOPROL-XL) 200 MG 24 hr tablet Take 100 mg by mouth once daily.    multivitamin with minerals Cap Take 1 capsule by mouth once daily.    pantoprazole (PROTONIX) 40 MG tablet Take 40 mg by mouth once daily.    sacubitriL-valsartan (ENTRESTO)  mg per tablet Take 1 tablet by mouth 2 (two) times daily.    spironolactone (ALDACTONE) 25 MG tablet Take 25 mg by mouth once daily.    [DISCONTINUED] metoprolol succinate (TOPROL-XL) 25 MG 24 hr tablet Take 100 mg by mouth once daily.    albuterol (PROVENTIL HFA) 90 mcg/actuation inhaler Inhale 2 puffs into the lungs every 6 (six) hours as needed for Wheezing. Rescue (Patient not taking: Reported on 5/17/2025)    aspirin (ECOTRIN) 81 MG EC tablet Take 1 tablet by mouth every other day. (Patient not taking: Reported on 5/17/2025)    furosemide (LASIX) 40 MG tablet Take 1 tablet (40 mg total) by mouth once daily. (Patient not taking: Reported on 5/17/2025)    nitroGLYCERIN (NITROSTAT) 0.4 MG SL tablet DIS 1 T UNT Q FIVE MINUTES UP TO THREE DOSES PRF CHEST PAIN (Patient not taking: Reported on 5/17/2025)     Family History       Problem Relation (Age of Onset)    Diabetes Mother    Hypertension Father    Stroke Father          Tobacco Use    Smoking status: Former     Current packs/day: 0.00     Average packs/day: 0.3 packs/day for 10.0 years (2.5 ttl pk-yrs)     Types: Cigarettes     Start date: 1/1/1963     Quit date: 1/1/1971     Years  since quittin.4    Smokeless tobacco: Never   Substance and Sexual Activity    Alcohol use: No     Comment: socially    Drug use: No    Sexual activity: Not on file     Review of Systems   Constitutional: Negative for chills and fever.   HENT:  Negative for hearing loss and nosebleeds.    Eyes:  Negative for blurred vision.   Cardiovascular:         As in HPI    Respiratory:  Negative for cough, hemoptysis and shortness of breath.    Endocrine: Negative for cold intolerance and polyuria.   Hematologic/Lymphatic: Negative for bleeding problem.   Skin:  Negative for itching.   Musculoskeletal:  Negative for falls.   Gastrointestinal:  Negative for abdominal pain and hematochezia.   Genitourinary:  Negative for hematuria.   Neurological:  Negative for dizziness and loss of balance.   Psychiatric/Behavioral:  Negative for altered mental status and depression.      Objective:     Vital Signs (Most Recent):  Temp: 99.1 °F (37.3 °C) (25 2308)  Pulse: (!) 52 (25 1303)  Resp: 20 (25 1200)  BP: (!) 119/55 (25 1303)  SpO2: 95 % (25 1303) Vital Signs (24h Range):  Temp:  [99.1 °F (37.3 °C)] 99.1 °F (37.3 °C)  Pulse:  [47-77] 52  Resp:  [10-44] 20  SpO2:  [92 %-96 %] 95 %  BP: ()/(51-74) 119/55     Weight: 117.9 kg (260 lb)  Body mass index is 34.3 kg/m².    SpO2: 95 %         Intake/Output Summary (Last 24 hours) at 2025 1422  Last data filed at 2025 0330  Gross per 24 hour   Intake --   Output 200 ml   Net -200 ml       Lines/Drains/Airways       Peripheral Intravenous Line  Duration                  Peripheral IV - Single Lumen 25 20 G Yes Anterior;Proximal;Right Forearm 1 day         Peripheral IV - Single Lumen 25 0510 20 G No Left Wrist <1 day                    Physical Exam  Constitutional:       Appearance: He is well-developed.   HENT:      Head: Normocephalic and atraumatic.   Eyes:      Conjunctiva/sclera: Conjunctivae normal.   Neck:      Vascular: No  carotid bruit or JVD.   Cardiovascular:      Rate and Rhythm: Normal rate and regular rhythm.      Pulses:           Carotid pulses are 2+ on the right side and 2+ on the left side.       Radial pulses are 2+ on the right side and 2+ on the left side.      Heart sounds: Murmur heard.      No friction rub. No gallop.   Pulmonary:      Effort: Pulmonary effort is normal. No respiratory distress.      Breath sounds: Normal breath sounds. No stridor. No wheezing.   Abdominal:      General: Abdomen is flat.      Palpations: Abdomen is soft.   Musculoskeletal:      Cervical back: Neck supple.      Right lower leg: No edema.      Left lower leg: No edema.   Skin:     General: Skin is warm and dry.   Neurological:      Mental Status: He is alert and oriented to person, place, and time.   Psychiatric:         Behavior: Behavior normal.         Significant Labs: All pertinent lab results from the last 24 hours have been reviewed. and   Recent Lab Results  (Last 5 results in the past 24 hours)        05/17/25  1024   05/17/25  0454   05/17/25  0416   05/17/25  0334   05/17/25  0326        Phencyclidine       Negative         A2C EF 43               A4C EF 42               Ammonia   31             Amphetamines, Urine       Negative         Anion Gap   13             Ascending aorta 2.8               Ao peak jonel 2.4               ASI 1.2               ASI 1.4               Ao VTI 49.3               Appearance, UA         Clear       AR Max Jonel 3.79               AV valve area 1.4               NILESH by Velocity Ratio 1.5               AV mean gradient 14               AV index (prosthetic) 0.31               AV peak gradient 23               AV regurgitation pressure 1/2 time 744               AV Velocity Ratio 0.33               Bacteria, UA         None       Barbituates, Urine       Negative         Benzodiazepine, Urine       Negative         Bilirubin (UA)         Negative       BSA 2.46               BUN   24              Calcium   8.7             Chloride   105             CO2   19             Cocaine, Urine       Negative         Color, UA         Yellow       Creatinine   1.2             Urine Creatinine       151.6         Left Ventricle Relative Wall Thickness 0.21               E/A ratio 0.70               E/E' ratio 15               eGFR   >60  Comment: Estimated GFR calculated using the CKD-EPI creatinine (2021) equation.             E wave deceleration time 225               FS 17.9               Glucose   93             Glucose, UA         4+       Extra Tube     Hold for add-ons.  Comment: Auto resulted.        Hold for add-ons.  Comment: Auto resulted.          Hyaline Casts, UA         1       IVC diameter 1.64               IVRT 114               IVSd 1.1               Ketones, UA         Trace       LA area A2C 20.73               LA area A4C 16.53               Lactic Acid Level   1.0             LA Vol 54               MARGO (MOD) 22               LVOT area 4.5               Leukocyte Esterase, UA         Negative       LV LATERAL E/E' RATIO 14.5               LV SEPTAL E/E' RATIO 14.5               LV EDV                LV Diastolic Volume Index 95.44               Left Ventricular End Diastolic Volume by Teichholz Method 229.81               LV EDV A2C [id number]               LV EDV A4C 242.14               Left Ventricular End Systolic Volume by Teichholz Method 145.87               LV ESV A2C 64.37               LV ESV A4C 42.55               LVIDd 6.7               LVIDs 5.5               LV mass 261.3               LV Mass Index 108.4               Left Ventricular Outflow Tract Mean Gradient 1.56               Left Ventricular Outflow Tract Mean Velocity 0.60               LVOT diameter 2.4               LVOT peak vickey 0.8               LVOT stroke volume 69.2               LVOT peak VTI 15.3               LV ESV                LV Systolic Volume Index 60.6               Mean e' 0.04         "       Methadone Screen, Urine       Negative         Microscopic Comment           Comment: Other formed elements not mentioned in the report are not present in the microscopic examination.       Mr max jonel 3.35               MV valve area p 1/2 method 3.37               MV "A" wave duration [id number]               MV valve area by continuity eq 1.86               MV mean gradient 1               MV peak gradient 4               MV Peak A Jonel 0.83               MV Peak E Jonel 0.58               MV stenosis pressure 1/2 time 65.22               MV VTI 37.1               NITRITE UA         Negative       Blood, UA         Negative       Jose's Biplane MOD Ejection Fraction 42               Opiates, Urine       Negative         pH, UA         5.0       Potassium   5.1             Protein, UA         Trace  Comment: Recommend a 24 hour urine protein or a urine protein/creatinine ratio if globulin induced proteinuria is clinically suspected.       PV peak gradient 5               PV PEAK VELOCITY 1.11               PW 0.7               RA Vol 62.12               RA Area 21.2               Est. RA pres 3               RA area length vol 56.91               RBC, UA         1       RV S' 15.93               RV TB RVSP 6               RV/LV Ratio 0.72               RV- ramon basal diam 4.8               Sinus 3.35               Sodium   137             Spec Grav UA         1.025       Squam Epithel, UA         <1       STJ 3.1               TAPSE 2.9               TDI SEPTAL 0.04               TDI LATERAL 0.04               Marijuana (THC) Metabolite       Negative         Triscuspid Valve Regurgitation Peak Gradient 38               TR Max Jonel 3.1               Troponin I   0.066  Comment: The reference interval for Troponin I represents the 99th percentile cutoff for our facility and is consistent with 3rd generation assay performance.             TV resting pulmonary artery pressure 41               " "Urobilinogen, UA         Negative       WBC, UA         2       Yeast, UA         None       ZLVIDD -5.12               ZLVIDS -1.55                                      Significant Imaging: Echocardiogram: Transthoracic echo (TTE) complete (Cupid Only):   Results for orders placed or performed during the hospital encounter of 05/16/25   Echo Saline Bubble? No; Ultrasound enhancing contrast? Yes   Result Value Ref Range    BSA 2.46 m2    Jose's Biplane MOD Ejection Fraction 42 %    A2C EF 43 %    A4C EF 42 %    LVOT stroke volume 69.2 cm3    LVIDd 6.7 (A) 3.5 - 6.0 cm    LV Systolic Volume 146 mL    LV Systolic Volume Index 60.6 mL/m2    LVIDs 5.5 (A) 2.1 - 4.0 cm    LV ESV A2C 64.37 mL    LV Diastolic Volume 230 mL    LV ESV A4C 42.55 mL    LV Diastolic Volume Index 95.44 mL/m2    LV EDV A2C 268.714620317503354 mL    LV EDV A4C 242.14 mL    Left Ventricular End Systolic Volume by Teichholz Method 145.87 mL    Left Ventricular End Diastolic Volume by Teichholz Method 229.81 mL    IVS 1.1 0.6 - 1.1 cm    LVOT diameter 2.4 cm    LVOT area 4.5 cm2    FS 17.9 (A) 28 - 44 %    Left Ventricle Relative Wall Thickness 0.21 cm    PW 0.7 0.6 - 1.1 cm    LV mass 261.3 g    LV Mass Index 108.4 g/m2    MV Peak E Jonel 0.58 m/s    TDI LATERAL 0.04 m/s    TDI SEPTAL 0.04 m/s    E/E' ratio 15 m/s    MV Peak A Jonel 0.83 m/s    TR Max Jonel 3.1 m/s    E/A ratio 0.70     IVRT 114 msec    E wave deceleration time 225 msec    MV "A" wave duration [id number] msec    LV SEPTAL E/E' RATIO 14.5 m/s    LV LATERAL E/E' RATIO 14.5 m/s    LVOT peak jonel 0.8 m/s    Left Ventricular Outflow Tract Mean Velocity 0.60 cm/s    Left Ventricular Outflow Tract Mean Gradient 1.56 mmHg    RV- ramon basal diam 4.8 cm    RV S' 15.93 cm/s    TAPSE 2.9 cm    RV/LV Ratio 0.72 cm    LA Vol (MOD) 54 mL    MARGO (MOD) 22 mL/m2    RA area length vol 56.91 mL    RA Area 21.2 cm2    RA Vol 62.12 mL    AV regurgitation pressure 1/2 time 744 ms    AR Max Jonel 3.79 " m/s    AV mean gradient 14 mmHg    AV peak gradient 23 mmHg    Ao peak vickey 2.4 m/s    Ao VTI 49.3 cm    LVOT peak VTI 15.3 cm    AV valve area 1.4 cm²    AV Velocity Ratio 0.33     AV index (prosthetic) 0.31     NILESH by Velocity Ratio 1.5 cm²    Mr max ivckey 3.35 m/s    MV mean gradient 1 mmHg    MV peak gradient 4 mmHg    MV stenosis pressure 1/2 time 65.22 ms    MV valve area p 1/2 method 3.37 cm2    MV valve area by continuity eq 1.86 cm2    MV VTI 37.1 cm    Triscuspid Valve Regurgitation Peak Gradient 38 mmHg    PV PEAK VELOCITY 1.11 m/s    PV peak gradient 5 mmHg    Sinus 3.35 cm    ASI 1.4 cm/m2    STJ 3.1 cm    Ascending aorta 2.8 cm    ASI 1.2 cm/m2    IVC diameter 1.64 cm    Mean e' 0.04 m/s    ZLVIDS -1.55     ZLVIDD -5.12     LA area A4C 16.53 cm2    LA area A2C 20.73 cm2    TV resting pulmonary artery pressure 41 mmHg    RV TB RVSP 6 mmHg    Est. RA pres 3 mmHg    Narrative      Left Ventricle: The left ventricle is moderately dilated. Mildly   increased wall thickness. Moderate global hypokinesis and regional wall   motion abnormalities present. See diagram for wall motion findings. There   is moderately reduced systolic function with a visually estimated ejection   fraction of 35 - 40%. Quantitated ejection fraction is 42%. Grade II   diastolic dysfunction.    Right Ventricle: The right ventricle is moderately dilated Systolic   function is normal. Pacemaker lead present in the ventricle.    Right Atrium: The right atrium is mildly dilated .    Aortic Valve: The aortic valve is a trileaflet valve. Severely   calcified cusps. There is moderate stenosis. Aortic valve area by VTI is   1.4 cm². Aortic valve peak velocity is 2.4 m/s. Mean gradient is 14 mmHg.   The dimensionless index is 0.31. There is mild aortic regurgitation.    Tricuspid Valve: There is mild regurgitation.    Pulmonary Artery: The estimated pulmonary artery systolic pressure is   41 mmHg.    IVC/SVC: Normal venous pressure at 3 mmHg.        Assessment and Plan:     Active Diagnoses:    Diagnosis Date Noted POA    PRINCIPAL PROBLEM:  Altered mental state [R41.82] 05/17/2025 Yes    Hyponatremia [E87.1] 05/17/2025 Yes    Acidosis, metabolic [E87.20] 05/17/2025 Yes    Stroke [I63.9] 12/25/2018 Yes    S/P right coronary artery (RCA) stent placement [Z95.5] 03/08/2018 Not Applicable    AICD (automatic cardioverter/defibrillator) present [Z95.810] 02/09/2018 Yes    Combined systolic and diastolic congestive heart failure, NYHA class 3 [I50.40] 05/02/2017 Yes     Chronic      Problems Resolved During this Admission:     -mildly hypervolemic on examination with bilateral swelling of the feet.  Repeat echocardiogram slight improvement in left ventricular ejection fraction with EF of 35-40%, elevated intracardiac filling pressures.  I agree with IV diuresis.  -patient has a [employer] single-chamber AICD.  Recommend device interrogation.  Denies any AICD shocks in the recent past.  -minimally elevated troponin at 0.06.  Continue to trend.  Continue dual antiplatelet therapy.  -agree with medical management of ischemic cardiomyopathy with Entresto, Aldactone, Toprol-XL.  -I will  continue to follow the patient  VTE Risk Mitigation (From admission, onward)           Ordered     enoxaparin injection 40 mg  Daily         05/17/25 0428     IP VTE HIGH RISK PATIENT  Once         05/17/25 0428     Place sequential compression device  Until discontinued         05/17/25 0428                    Thank you for your consult. I will follow-up with patient. Please contact us if you have any additional questions.    Augusto Nelson MD  Cardiology   [facility] - Emergency Dept

## 2025-05-17 NOTE — NURSING
Report received from ED. Patient received in stable condition in no acute distress. Vital Signs taken. Cardiac monitoring in place. Oriented to room and call light. Instructed to use call light for assistance.

## 2025-05-17 NOTE — ED NOTES
Patient transported to floor awake, alert, oriented, on the telemetry monitor, on 2L NC, with belongings

## 2025-05-17 NOTE — ED PROVIDER NOTES
Encounter Date: 2025       History     Chief Complaint   Patient presents with    Altered Mental Status     Per EMS pt drove himself to nearest gas station while en route to . Pt from [place], [place]. Pt was found laying on ground by gas station staff with unknown LOC. No obvious injuries or deformities noted. Pt unable to recall events. Pt 80% on room air, EMS placed pt on 6L NC. Denies pain.     HPI  79-year-old male with extensive medical history including ischemic cardiomyopathy with an EF of less than 15% and multivessel CAD, presents brought in by EMS for altered mental status.  Patient drove to a gas station this evening, and after several hours of not leaving was noted to be lying on the ground next their car by gas station employees.  He had been on the way to a .    His son states that he has had issues with this that were similar in the past when he had taken diuretics and not drank anything as he did not want want to have to urinate during these functions.  He states that in those in the instances he has ended up with similar states of generalized confusion or weakness which resolved with gentle hydration.  Review of patient's allergies indicates:   Allergen Reactions    Atorvastatin Other (See Comments)     Past Medical History:   Diagnosis Date    Cardiomyopathy     ICM EF 15%    CHF (congestive heart failure)     chronic combined     Coronary artery disease     3 vessel    Diverticulitis large intestine 2014    GERD (gastroesophageal reflux disease)     Hypertension     Obesity      Past Surgical History:   Procedure Laterality Date    CARDIAC DEFIBRILLATOR PLACEMENT Left 2017    CORONARY ANGIOPLASTY WITH STENT PLACEMENT  2017    coronary angiogram; stents x 5    UMBILICAL HERNIA REPAIR           Family History   Problem Relation Name Age of Onset    Diabetes Mother      Hypertension Father      Stroke Father      Asthma Neg Hx      Emphysema Neg Hx       Social  History[1]  Medical surgical family social history reviewed  Review of Systems  Unable to obtain due to general encephalopathy  Physical Exam     Initial Vitals   BP Pulse Resp Temp SpO2   05/16/25 2308 05/16/25 2308 05/17/25 0000 05/16/25 2308 05/16/25 2308   (!) 122/53 77 (!) 22 99.1 °F (37.3 °C) 95 %      MAP       --                Physical Exam  Physical  General: Awake, alert, no acute distress  Head: Normocephalic, atraumatic  Neck: Trachea midline, full range of motion, no meningismus  ENT:  Slightly dry mucous membranes.  Atraumatic, Airway Patent  Cardio: Regular Rate, Regular Rhythm, Heart Sounds Normal, Capillary refill normal  Chest: Atraumatic, No respiratory distress no use of accessory muscles to breath, normal rate, sounds even and clear to auscultation  Abdomen: Soft, Nontender, no involuntary guarding, rigidity, or rebound.  Upper Ext: Atraumatic, Inspection normal, no swelling  Lower Ext: Atraumatic, Inspection normal, no swelling  Neuro:  Awake and oriented x4 however slow to respond and in an anything other than direct short word questioning patient states stranger tangential statements.  Cranial nerves 2-12 intact, strength 5/5 in all extremities, no alberto ataxia and no pronator drift.  Stroke scale is 0  ED Course   Procedures  Labs Reviewed   COMPREHENSIVE METABOLIC PANEL - Abnormal       Result Value    Sodium 133 (*)     Potassium 4.9      Chloride 105      CO2 17 (*)     Glucose 89      BUN 24 (*)     Creatinine 1.4      Calcium 8.5 (*)     Protein Total 7.1      Albumin 3.6      Bilirubin Total 0.8      ALP 58      AST 67 (*)     ALT 40      Anion Gap 11      eGFR 51 (*)    TROPONIN I - Abnormal    Troponin-I 0.043 (*)    B-TYPE NATRIURETIC PEPTIDE - Abnormal     (*)    URINALYSIS, REFLEX TO URINE CULTURE - Abnormal    Color, UA Yellow      Appearance, UA Clear      pH, UA 5.0      Spec Grav UA 1.025      Protein, UA Trace (*)     Glucose, UA 4+ (*)     Ketones, UA Trace (*)      "Bilirubin, UA Negative      Blood, UA Negative      Nitrites, UA Negative      Urobilinogen, UA Negative      Leukocyte Esterase, UA Negative     CBC WITH DIFFERENTIAL - Abnormal    WBC 7.73      RBC 5.23      HGB 16.4      HCT 47.2      MCV 90      MCH 31.4 (*)     MCHC 34.7      RDW 13.0      Platelet Count 98 (*)     MPV 11.4      Nucleated RBC 0      Neut % 86.2 (*)     Lymph % 5.2 (*)     Mono % 8.0      Eos % 0.0      Basophil % 0.1      Imm Grans % 0.5      Neut # 6.66      Lymph # 0.40 (*)     Mono # 0.62      Eos # 0.00      Baso # 0.01      Imm Grans # 0.04     PLATELET REVIEW - Abnormal    Platelet Estimate Decreased (*)    BASIC METABOLIC PANEL - Abnormal    Sodium 137      Potassium 5.1      Chloride 105      CO2 19 (*)     Glucose 93      BUN 24 (*)     Creatinine 1.2      Calcium 8.7      Anion Gap 13      eGFR >60     TROPONIN I - Abnormal    Troponin-I 0.066 (*)    DRUG SCREEN PANEL, URINE EMERGENCY - Normal    Benzodiazepine, Urine Negative      Methadone, Urine Negative      Cocaine, Urine Negative      Opiates, Urine Negative      Barbiturates, Urine Negative      Amphetamines, Urine Negative      THC Negative      Phencyclidine, Urine Negative      Urine Creatinine 151.6      Narrative:     This screen includes the following classes of drugs at the listed cut-off:     Benzodiazepines:        200 ng/ml   Methadone:              300 ng/ml   Cocaine metabolite:     300 ng/ml   Opiates:                300 ng/ml   Barbiturates:           200 ng/ml   Amphetamines:           1000 ng/ml   Marijuana metabs (THC): 50 ng/ml   Phencyclidine (PCP):    25 ng/ml     This is a screening test. If results do not correlate with clinical presentation, then a confirmatory send out test is advised.    This report is intended for use in clinical monitoring and management of patients. It is not intended for use in employment related drug testing."   TSH - Normal    TSH 1.539     APTT - Normal    PTT 28.2   "   PROTIME-INR - Normal    PT 11.9      INR 1.0     ALCOHOL,MEDICAL (ETHANOL) - Normal    Alcohol, Serum <10     LACTIC ACID, PLASMA - Normal    Lactic Acid Level 1.0      Narrative:     Falsely low lactic acid results can be found in samples containing >=13.0 mg/dL total bilirubin and/or >=3.5 mg/dL direct bilirubin.    AMMONIA - Normal    Ammonia 31     CBC W/ AUTO DIFFERENTIAL    Narrative:     The following orders were created for panel order CBC auto differential.  Procedure                               Abnormality         Status                     ---------                               -----------         ------                     CBC with Differential[[id number]]       Abnormal            Final result                 Please view results for these tests on the individual orders.   EXTRA TUBES    Narrative:     The following orders were created for panel order EXTRA TUBES.  Procedure                               Abnormality         Status                     ---------                               -----------         ------                     Light Green Top Hold[[id number]]                            Final result                 Please view results for these tests on the individual orders.   LIGHT GREEN TOP HOLD    Extra Tube Hold for add-ons.     GREY TOP URINE HOLD    Extra Tube Hold for add-ons.     URINALYSIS MICROSCOPIC    RBC, UA 1      WBC, UA 2      Bacteria, UA None      Yeast, UA None      Squamous Epithelial Cells, UA <1      Hyaline Casts, UA 1      Microscopic Comment       CARBON MONOXIDE, BLOOD   CARBOXYHEMOGLOBIN          Imaging Results              X-Ray Chest AP Portable (Final result)  Result time 05/17/25 00:58:04      Final result by Jayesh Rodriguez MD (05/17/25 00:58:04)                   Impression:      Radiographic findings as above.      Electronically signed by: Jayesh Rodriguez  Date:    05/17/2025  Time:    00:58               Narrative:    EXAMINATION:  XR CHEST AP  PORTABLE    CLINICAL HISTORY:  Encephalopathy, unspecified    TECHNIQUE:  Single frontal view of the chest was performed.    COMPARISON:  Chest radiograph January 5, 2025    FINDINGS:  Single portable chest radiograph is submitted.  Cardiac pacemaker is again noted.  The heart size appears enlarged however when accounting for position and technique and depth of inspiration the appearance of the cardiomediastinal silhouette is stable.  Aortic atherosclerotic change noted.    Accentuation of interstitial markings in part may relate to chronic change however with suspected superimposed interstitial infiltrate/edema particularly at the lung bases.  Mild basilar infiltrates are noted.  There is no dense consolidation.    Minimal blunting at the right costophrenic angle appears similar to the prior study, and may relate to chronic change although may relate to minimal pleural fluid.  There is no large pleural effusion and bilaterally there is no evidence for pneumothorax.    The osseous structures demonstrate chronic change.                                       CT Head Without Contrast (Final result)  Result time 05/17/25 00:43:22      Final result by Leobardo Flood DO (05/17/25 00:43:22)                   Impression:      No acute intracranial abnormality.    Chronic microvascular ischemic changes and remote infarcts as above.      Electronically signed by: Leobardo Flood  Date:    05/17/2025  Time:    00:43               Narrative:    EXAMINATION:  CT HEAD WITHOUT CONTRAST    CLINICAL HISTORY:  Encephalopathy;    TECHNIQUE:  Low dose axial CT images obtained throughout the head without intravenous contrast. Sagittal and coronal reconstructions were performed.    COMPARISON:  MRI brain from 01/29/2019.  CT head from 12/26/2018.    FINDINGS:  Ventricles and sulci are normal in size for age without evidence of hydrocephalus. No extra-axial blood or fluid collections.  There are remote lacunar infarcts in the bilateral  cerebellar hemispheres and the right basal ganglia and corona radiata.  There is a remote lacunar infarct in the left delgado.  There are chronic microvascular ischemic changes, unchanged prior.  There are several punctate calcifications, unchanged, likely related to remote trauma or infection.  No parenchymal mass, hemorrhage, edema or major vascular distribution infarct.    No calvarial fracture.  There is a density in the left frontal scalp soft tissues.  Bilateral paranasal sinuses and mastoid air cells are clear.                                       Medications   aspirin EC tablet 81 mg (has no administration in time range)   clopidogreL tablet 75 mg (has no administration in time range)   cyanocobalamin tablet 1,000 mcg (has no administration in time range)   metoprolol succinate (TOPROL-XL) 24 hr tablet 100 mg (has no administration in time range)   pantoprazole EC tablet 40 mg (has no administration in time range)   sacubitriL-valsartan  mg per tablet 1 tablet (has no administration in time range)   spironolactone tablet 25 mg (has no administration in time range)   sodium chloride 0.9% flush 10 mL (has no administration in time range)   naloxone 0.4 mg/mL injection 0.02 mg (has no administration in time range)   glucose chewable tablet 16 g (has no administration in time range)   glucose chewable tablet 24 g (has no administration in time range)   dextrose 50% injection 12.5 g (has no administration in time range)   dextrose 50% injection 25 g (has no administration in time range)   glucagon (human recombinant) injection 1 mg (has no administration in time range)   enoxaparin injection 40 mg (has no administration in time range)   potassium bicarbonate disintegrating tablet 50 mEq (has no administration in time range)   potassium bicarbonate disintegrating tablet 35 mEq (has no administration in time range)   potassium bicarbonate disintegrating tablet 60 mEq (has no administration in time range)    magnesium oxide tablet 800 mg (has no administration in time range)   magnesium oxide tablet 800 mg (has no administration in time range)   potassium, sodium phosphates 280-160-250 mg packet 2 packet (has no administration in time range)   potassium, sodium phosphates 280-160-250 mg packet 2 packet (has no administration in time range)   potassium, sodium phosphates 280-160-250 mg packet 2 packet (has no administration in time range)   acetaminophen tablet 650 mg (has no administration in time range)   ondansetron injection 4 mg (has no administration in time range)   melatonin tablet 6 mg (has no administration in time range)   simethicone chewable tablet 80 mg (has no administration in time range)   aluminum-magnesium hydroxide-simethicone 200-200-20 mg/5 mL suspension 30 mL (has no administration in time range)   0.9% NaCl infusion (500 mLs Intravenous New Bag 25 [phone])     Medical Decision Making  Amount and/or Complexity of Data Reviewed  Labs: ordered.  Radiology: ordered.    Risk  Prescription drug management.    79-year-old male with extensive medical history including ischemic cardiomyopathy with an EF of less than 15% and multivessel CAD, presents brought in by EMS for altered mental status.  Patient drove to a gas station this evening, and after several hours of not leaving was noted to be lying on the ground next their car by gas station employees.  He had been on the way to a .    His son states that he has had issues with this that were similar in the past when he had taken diuretics and not drank anything as he did not want want to have to urinate during these functions.  He states that in those in the instances he has ended up with similar states of generalized confusion or weakness which resolved with gentle hydration.    His NIH stroke scale is 0, he would not be a candidate for tPA/TN case or endovascular intervention.  A small lacunar infarct remains in the differential as this he  had a multitude of them in the past.  He is on medically optimized therapy from this perspective already, and he is regularly seen his cardiologist Dr. Avendano about this.     Will evaluate for acute pathology requiring intervention with appropriate studies, treat symptomatically, and reassess.    All resulted studies reviewed and treatments given. Patient to be admitted for further evaluation and treatment. All elements of care discussed with the admitting team who will continue care and accepts admission.             Patient seen in the Emergency department, which included consideration and evaluation for life and limb threatening medical conditions including the history, exam, timely review and interpretation of studies.   All labs and imaging ordered for this encountered were reviewed and included in medical decision making.   Additional information for this case was obtained from discussion with:  Previous Internal Medicine records  Additional information for this case was obtained from review of medical records including:  Family present at bedside                 Clinical Impression:  Final diagnoses:  [G93.40] Acute encephalopathy (Primary)  [Z86.79] History of cardiomyopathy          ED Disposition Condition    Observation                     [1]   Social History  Tobacco Use    Smoking status: Former     Current packs/day: 0.00     Average packs/day: 0.3 packs/day for 10.0 years (2.5 ttl pk-yrs)     Types: Cigarettes     Start date: 1963     Quit date: 1971     Years since quittin.4    Smokeless tobacco: Never   Substance Use Topics    Alcohol use: No     Comment: socially    Drug use: No        Luis Colón MD  [phone]

## 2025-05-17 NOTE — ASSESSMENT & PLAN NOTE
Seems back to baseline currently. He is AO, conversant.   Seems he was lost or down too long to meet the classic definition of syncope. No known ingestions, tox negative. No apparent seizure activity.  -Cardiac monitoring  -EEG  -Echocardiogram  -repeat troponin  -CT head unremarkable  -Neuro checks q4hrs  -interrogate pacemaker

## 2025-05-17 NOTE — ASSESSMENT & PLAN NOTE
chronic  Antithrombotics for secondary stroke prevention: Antiplatelets: Aspirin: 81 mg daily  Clopidogrel: 75 mg daily    Statins for secondary stroke prevention and hyperlipidemia, if present:   Statins: Statins contraindicated due to allergy    Aggressive risk factor modification: HTN, HLD, Diet, Obesity, CAD     Rehab efforts: The patient has been evaluated by a stroke team provider and the therapy needs have been fully considered based off the presenting complaints and exam findings. The following therapy evaluations are needed: None    Diagnostics ordered/pending: CT scan of head without contrast to asses brain parenchyma    VTE prophylaxis: Heparin 5000 units SQ every 8 hours    BP parameters: none

## 2025-05-17 NOTE — PROCEDURES
DATE: 5/17/25    EEG NUMBER: OK     REFERRING PHYSICIAN:  Dr. Ornelas      This EEG was performed to assess for subclinical seizures      ELECTROENCEPHALOGRAM REPORT     METHODOLOGY:  Electroencephalographic (EEG) recording is with electrodes placed according to the International 10-20 placement system.  Thirty two (32) channels of digital signal are simultaneously recorded from the scalp and may include EKG, EMG, and/or eye monitors.   Recording band pass was 0.1 to 512 hz.  Digital video recording of the patient is simultaneously recorded with the EEG.  The nursing staff report clinical symptoms and may press an event button when the patient has symptoms of clinical interest to the treating physicians.  EEG and video recording is stored and archived in digital format.  The entire recording is visually reviewed, and the times identified by computer analysis as being spikes or seizures are reviewed again.  Activation procedures which include photic stimulation, hyperventilation and instructing patients to perform simple task are done in selected patients.   Compresses spectral analysis (CSA) is also performed on the activity recorded from each individual channel.  This is displayed as a power display of frequencies from 0 to 30 Hz over time.   The CSA analysis is done and displayed continuously.  This is reviewed for asymmetries in power between homologous areas of the scalp and for presence of changes in power which can be seen when seizures occur.  Sections of suspected abnormalities on the CSA is then compared with the original EEG recording.                [employer] software was also utilized in the review of this study.  This software suite analyzes the EEG recording in multiple domains.  Coherence and rhythmicity is computed to identify EEG sections which may contain organized seizures.  Each channel undergoes analysis to detect presence of spike and sharp waves which have special and morphological  characteristic of epileptic activity.  The routine EEG recording is converted from spacial into frequency domain.  This is then displayed comparing homologous areas to identify areas of significant asymmetry.  Algorithm to identify non-cortically generated artifact is used to separate eye movement, EMG and other artifact from the EEG.     EEG FINDINGS:  The recording was obtained with a number of standard bipolar and referential montages during wakefulness, drowsiness and sleep.  In the alert state, the posterior background rhythm was a symmetric, well-modulated 9 Hz activity, which reacted symmetrically to eye opening.  Rare left temporal focal mixed delta range slowing was noted During drowsiness, the background rhythm waxed and waned and there were periods of slowing.  During stage II sleep, symmetric V waves and sleep spindles were noted. There were no interictal epileptiform abnormalities and no clinical or electrographic seizures were recorded.    The EKG channel revealed a sinus rhythm.     IMPRESSION:  This is an abnormal EEG during wakefulness, drowsiness and sleep. Rare left temporal focal mixed delta range slowing was noted     CLINICAL CORRELATION:  The patient is a 71-year-old male who presented with an episode of confusion.  The patient is currently not maintained on any antiseizure medications.  This is an abnormal EEG during wakefulness, drowsiness and sleep.  The presence of left temporal focal slowing suggestive focal neural dysfunction this region.  There is no evidence of an epileptic process on this recording.  No seizures were recorded during this study.

## 2025-05-17 NOTE — ASSESSMENT & PLAN NOTE
Hyponatremia is likely due to Dehydration/hypovolemia. The patient's most recent sodium results are listed below.  Recent Labs     05/17/25  0131   *     Plan  - Correct the sodium by 4-6mEq in 24 hours.   - Obtain the following studies: none.  - Will treat the hyponatremia with IV fluids as follows: 1L in ed  - Monitor sodium Daily.   - Patient hyponatremia is stable  - mild

## 2025-05-17 NOTE — H&P
[facility] Emergency Dept  [facility] Medicine  History & Physical    Patient Name: Nimesh Saini  MRN: [id number]  Admission Date: 2025  Attending Physician: Lazaro Price MD  Primary Care Provider: No primary care provider on file.         Patient information was obtained from patient, EMS personnel, past medical records, and ER records.       Subjective:     Principal Problem:Altered mental state    Chief Complaint:   Chief Complaint   Patient presents with    Altered Mental Status     Per EMS pt drove himself to nearest gas station while en route to . Pt from [place], [place]. Pt was found laying on ground by gas station staff with unknown LOC. No obvious injuries or deformities noted. Pt unable to recall events. Pt 80% on room air, EMS placed pt on 6L NC. Denies pain.        HPI: 79-year-old male with extensive medical history including ischemic cardiomyopathy with an EF of less than 15% and multivessel CAD, presents brought in by EMS for altered mental status.  Patient drove to a gas station this evening, and after several hours of not leaving was noted to be lying on the ground next their car by gas station employees.  He had been on the way to a .     He states he was driving and trying to find a [employer] and it was like he lost control of everything. He  last remembers looking for the [employer].  No bladder or bowel incontinence. No tongue biting.     Mild leg swelling that is new. He has been eating a lot of crawfish and crabs.       No new subjective & objective note has been filed under this hospital service since the last note was generated.    Assessment/Plan:     Assessment & Plan  Altered mental state  Seems back to baseline currently. He is AO, conversant.   Seems he was lost or down too long to meet the classic definition of syncope. No known ingestions, tox negative. No apparent seizure activity.  -Cardiac monitoring  -EEG  -Echocardiogram  -repeat troponin  -CT head unremarkable  -Neuro checks  q4hrs  -interrogate pacemaker    Combined systolic and diastolic congestive heart failure, NYHA class 3  Patient has Combined Systolic and Diastolic heart failure that is Chronic. On presentation their CHF was well compensated. Most recent BNP and echo results are listed below.  Recent Labs     05/17/25  0014   *     Latest ECHO  Results for orders placed during the hospital encounter of 01/05/25    Echo    Interpretation Summary    Left Ventricle: The left ventricle is normal in size. There is concentric hypertrophy. Regional wall motion abnormalities present. See diagram for wall motion findings. There is reduced systolic function. Quantitated ejection fraction is 36%. Unable to assess diastolic function due to poor image quality.    Right Ventricle: Normal right ventricular cavity size. Systolic function is normal. Pacemaker lead present in the ventricle.    Aortic Valve: There is aortic valve sclerosis. Mildly restricted motion. There is mild aortic regurgitation.    Mitral Valve: There is mild regurgitation.    Pulmonary Artery: The estimated pulmonary artery systolic pressure is 19 mmHg.    IVC/SVC: Normal venous pressure at 3 mmHg.    Current Heart Failure Medications  metoprolol succinate (TOPROL-XL) 24 hr tablet 100 mg, Daily, Oral  sacubitriL-valsartan  mg per tablet 1 tablet, 2 times daily, Oral  spironolactone tablet 25 mg, Daily, Oral    Plan  - Monitor strict I&Os and daily weights.    - Place on telemetry  - Low sodium diet  - Place on fluid restriction of 2 L.   - Cardiology has not been consulted  - The patient's volume status is at their baseline  - slight LE edema that is new with elevated BNP. In the setting of AMS and found down, will hold on diuresis pending echo.   -cont entresto, metoprolol, spironolactone      Acidosis, metabolic  Unclear etiology other than found down  -renal function at baseline  -Non anion gap  -repeat lactate and bmp  -not a known diabetic      AICD  (automatic cardioverter/defibrillator) present  Chronic  -interrogate in the setting of found down to rule out cardiac event.     S/P right coronary artery (RCA) stent placement  Chonic, stable  -cardiac monitoring  -slight elevation of troponin  -repeat troponin    Stroke  chronic  Antithrombotics for secondary stroke prevention: Antiplatelets: Aspirin: 81 mg daily  Clopidogrel: 75 mg daily    Statins for secondary stroke prevention and hyperlipidemia, if present:   Statins: Statins contraindicated due to allergy    Aggressive risk factor modification: HTN, HLD, Diet, Obesity, CAD     Rehab efforts: The patient has been evaluated by a stroke team provider and the therapy needs have been fully considered based off the presenting complaints and exam findings. The following therapy evaluations are needed: None    Diagnostics ordered/pending: CT scan of head without contrast to asses brain parenchyma    VTE prophylaxis: Heparin 5000 units SQ every 8 hours    BP parameters: none      Hyponatremia  Hyponatremia is likely due to Dehydration/hypovolemia. The patient's most recent sodium results are listed below.  Recent Labs     05/17/25  0131   *     Plan  - Correct the sodium by 4-6mEq in 24 hours.   - Obtain the following studies: none.  - Will treat the hyponatremia with IV fluids as follows: 1L in ed  - Monitor sodium Daily.   - Patient hyponatremia is stable  - mild  VTE Risk Mitigation (From admission, onward)           Ordered     enoxaparin injection 40 mg  Daily         05/17/25 0428     IP VTE HIGH RISK PATIENT  Once         05/17/25 0428     Place sequential compression device  Until discontinued         05/17/25 0428                   Code - DNR      The attending portion of this evaluation, treatment, and documentation was performed per Lazaro Price Jr, MD via Telemedicine AudioVisual using the secure [employer] software platform with 2 way audio/video. The provider was located off-site and the patient is  located in the hospital. The aforementioned video software was utilized to document the relevant history and physical exam    On 05/17/2025, patient should be placed in hospital observation services under my care.        Lazaro Price Jr, MD  Department of Hospital Medicine   [facility] - Emergency Dept

## 2025-05-17 NOTE — HPI
79-year-old male with extensive medical history including ischemic cardiomyopathy with an EF of less than 15% and multivessel CAD, presents brought in by EMS for altered mental status.  Patient drove to a gas station this evening, and after several hours of not leaving was noted to be lying on the ground next their car by gas station employees.  He had been on the way to a .     He states he was driving and trying to find a [employer] and it was like he lost control of everything. He  last remembers looking for the [employer].  No bladder or bowel incontinence. No tongue biting.     Mild leg swelling that is new. He has been eating a lot of crawfish and crabs.

## 2025-05-18 PROBLEM — R50.9 FEVER: Status: ACTIVE | Noted: 2025-05-18

## 2025-05-18 PROCEDURE — 63600175 PHARM REV CODE 636 W HCPCS: Performed by: INTERNAL MEDICINE

## 2025-05-18 PROCEDURE — 25000003 PHARM REV CODE 250: Performed by: INTERNAL MEDICINE

## 2025-05-18 PROCEDURE — 99233 SBSQ HOSP IP/OBS HIGH 50: CPT | Mod: ,,, | Performed by: INTERNAL MEDICINE

## 2025-05-18 PROCEDURE — 25000003 PHARM REV CODE 250: Performed by: STUDENT IN AN ORGANIZED HEALTH CARE EDUCATION/TRAINING PROGRAM

## 2025-05-18 PROCEDURE — 99900035 HC TECH TIME PER 15 MIN (STAT)

## 2025-05-18 PROCEDURE — 94761 N-INVAS EAR/PLS OXIMETRY MLT: CPT

## 2025-05-18 PROCEDURE — 27000221 HC OXYGEN, UP TO 24 HOURS

## 2025-05-18 PROCEDURE — 21400001 HC TELEMETRY ROOM

## 2025-05-18 RX ADMIN — ACETAMINOPHEN 650 MG: 325 TABLET ORAL at 03:05

## 2025-05-18 RX ADMIN — MELATONIN TAB 3 MG 6 MG: 3 TAB at 08:05

## 2025-05-18 RX ADMIN — SACUBITRIL AND VALSARTAN 1 TABLET: 97; 103 TABLET, FILM COATED ORAL at 08:05

## 2025-05-18 RX ADMIN — SPIRONOLACTONE 25 MG: 25 TABLET, FILM COATED ORAL at 08:05

## 2025-05-18 RX ADMIN — ENOXAPARIN SODIUM 40 MG: 40 INJECTION SUBCUTANEOUS at 04:05

## 2025-05-18 RX ADMIN — PANTOPRAZOLE SODIUM 40 MG: 40 TABLET, DELAYED RELEASE ORAL at 08:05

## 2025-05-18 RX ADMIN — CYANOCOBALAMIN TAB 1000 MCG 1000 MCG: 1000 TAB at 08:05

## 2025-05-18 RX ADMIN — GUAIFENESIN 200 MG: 100 SOLUTION ORAL at 08:05

## 2025-05-18 RX ADMIN — GUAIFENESIN 200 MG: 100 SOLUTION ORAL at 03:05

## 2025-05-18 RX ADMIN — GUAIFENESIN 200 MG: 100 SOLUTION ORAL at 04:05

## 2025-05-18 RX ADMIN — CLOPIDOGREL BISULFATE 75 MG: 75 TABLET, FILM COATED ORAL at 08:05

## 2025-05-18 NOTE — PLAN OF CARE
Patient is AAOx4. Patient given medications as ordered per MAR. Pt on 2L NC. Cardiac monitoring in place. Urinal provided and within reach. Safety maintained. Family at bedside. Telesitter in place. Instructed to use call light for assistance.        Problem: Fall Injury Risk  Goal: Absence of Fall and Fall-Related Injury  Outcome: Progressing     Problem: Comorbidity Management  Goal: Maintenance of Heart Failure Symptom Control  Outcome: Progressing  Goal: Blood Pressure in Desired Range  Outcome: Progressing

## 2025-05-18 NOTE — PROGRESS NOTES
[place] - [facility] Surg  Cardiology  Progress Note    Patient Name: Nimesh Saini  MRN: [id number]  Admission Date: 5/16/2025  Hospital Length of Stay: 0 days  Code Status: DNR   Attending Physician: Terrence Ornelas MD   Primary Care Physician: No primary care provider on file.  Expected Discharge Date:   Principal Problem:Altered mental state    Subjective:     No acute issues overnight.  Swelling of the feet significantly improved with IV diuretic therapy.  No significant arrhythmias on telemetry.  His baseline sinus bradycardia.  Discussed aortic stenosis with the patient.     Review of Systems   Constitutional: Negative for chills and fever.   HENT:  Negative for hearing loss and nosebleeds.    Eyes:  Negative for blurred vision.   Cardiovascular:         As in HPI    Respiratory:  Negative for cough, hemoptysis and shortness of breath.    Endocrine: Negative for cold intolerance and polyuria.   Hematologic/Lymphatic: Negative for bleeding problem.   Skin:  Negative for itching.   Musculoskeletal:  Negative for falls.   Gastrointestinal:  Negative for abdominal pain and hematochezia.   Genitourinary:  Negative for hematuria.   Neurological:  Negative for dizziness and loss of balance.   Psychiatric/Behavioral:  Negative for altered mental status and depression.      Objective:     Vital Signs (Most Recent):  Temp: 99.1 °F (37.3 °C) (05/18/25 1158)  Pulse: (!) 57 (05/18/25 1158)  Resp: 16 (05/18/25 1158)  BP: (!) 140/69 (05/18/25 1158)  SpO2: 98 % (05/18/25 1158) Vital Signs (24h Range):  Temp:  [97.9 °F (36.6 °C)-99.1 °F (37.3 °C)] 99.1 °F (37.3 °C)  Pulse:  [44-57] 57  Resp:  [16-21] 16  SpO2:  [92 %-99 %] 98 %  BP: (106-140)/(55-80) 140/69     Weight: 117 kg (257 lb 15 oz)  Body mass index is 34.03 kg/m².    SpO2: 98 %         Intake/Output Summary (Last 24 hours) at 5/18/2025 1300  Last data filed at 5/18/2025 0440  Gross per 24 hour   Intake --   Output 975 ml   Net -975 ml       Lines/Drains/Airways       Peripheral  Intravenous Line  Duration                  Peripheral IV - Single Lumen 05/16/25 20 G Yes Anterior;Proximal;Right Forearm 2 days         Peripheral IV - Single Lumen 05/17/25 0510 20 G No Left Wrist 1 day                    Physical Exam  Constitutional:       Appearance: He is well-developed.   HENT:      Head: Normocephalic and atraumatic.   Eyes:      Conjunctiva/sclera: Conjunctivae normal.   Neck:      Vascular: No carotid bruit or JVD.   Cardiovascular:      Rate and Rhythm: Normal rate and regular rhythm.      Pulses:           Carotid pulses are 2+ on the right side and 2+ on the left side.       Radial pulses are 2+ on the right side and 2+ on the left side.      Heart sounds: Normal heart sounds. No murmur heard.     No friction rub. No gallop.   Pulmonary:      Effort: Pulmonary effort is normal. No respiratory distress.      Breath sounds: Normal breath sounds. No stridor. No wheezing.   Abdominal:      General: Abdomen is flat.      Palpations: Abdomen is soft.   Musculoskeletal:      Cervical back: Neck supple.      Right lower leg: No edema.      Left lower leg: No edema.   Skin:     General: Skin is warm and dry.   Neurological:      Mental Status: He is alert and oriented to person, place, and time.   Psychiatric:         Behavior: Behavior normal.         Significant Labs: All pertinent lab results from the last 24 hours have been reviewed. and   Recent Lab Results       None            Significant Imaging: Echocardiogram: Transthoracic echo (TTE) complete (Cupid Only):   Results for orders placed or performed during the hospital encounter of 05/16/25   Echo Saline Bubble? No; Ultrasound enhancing contrast? Yes   Result Value Ref Range    BSA 2.46 m2    Jose's Biplane MOD Ejection Fraction 42 %    A2C EF 43 %    A4C EF 42 %    LVOT stroke volume 69.2 cm3    LVIDd 6.7 (A) 3.5 - 6.0 cm    LV Systolic Volume 146 mL    LV Systolic Volume Index 60.6 mL/m2    LVIDs 5.5 (A) 2.1 - 4.0 cm    LV ESV A2C  "64.37 mL    LV Diastolic Volume 230 mL    LV ESV A4C 42.55 mL    LV Diastolic Volume Index 95.44 mL/m2    LV EDV A2C 268.184179769590926 mL    LV EDV A4C 242.14 mL    Left Ventricular End Systolic Volume by Teichholz Method 145.87 mL    Left Ventricular End Diastolic Volume by Teichholz Method 229.81 mL    IVS 1.1 0.6 - 1.1 cm    LVOT diameter 2.4 cm    LVOT area 4.5 cm2    FS 17.9 (A) 28 - 44 %    Left Ventricle Relative Wall Thickness 0.21 cm    PW 0.7 0.6 - 1.1 cm    LV mass 261.3 g    LV Mass Index 108.4 g/m2    MV Peak E Jonel 0.58 m/s    TDI LATERAL 0.04 m/s    TDI SEPTAL 0.04 m/s    E/E' ratio 15 m/s    MV Peak A Jonel 0.83 m/s    TR Max Jonel 3.1 m/s    E/A ratio 0.70     IVRT 114 msec    E wave deceleration time 225 msec    MV "A" wave duration 117.528997608691656 msec    LV SEPTAL E/E' RATIO 14.5 m/s    LV LATERAL E/E' RATIO 14.5 m/s    LVOT peak jonel 0.8 m/s    Left Ventricular Outflow Tract Mean Velocity 0.60 cm/s    Left Ventricular Outflow Tract Mean Gradient 1.56 mmHg    RV- rmaon basal diam 4.8 cm    RV S' 15.93 cm/s    TAPSE 2.9 cm    RV/LV Ratio 0.72 cm    LA Vol (MOD) 54 mL    MARGO (MOD) 22 mL/m2    RA area length vol 56.91 mL    RA Area 21.2 cm2    RA Vol 62.12 mL    AV regurgitation pressure 1/2 time 744 ms    AR Max Jonel 3.79 m/s    AV mean gradient 14 mmHg    AV peak gradient 23 mmHg    Ao peak jonel 2.4 m/s    Ao VTI 49.3 cm    LVOT peak VTI 15.3 cm    AV valve area 1.4 cm²    AV Velocity Ratio 0.33     AV index (prosthetic) 0.31     NILESH by Velocity Ratio 1.5 cm²    Mr max jonel 3.35 m/s    MV mean gradient 1 mmHg    MV peak gradient 4 mmHg    MV stenosis pressure 1/2 time 65.22 ms    MV valve area p 1/2 method 3.37 cm2    MV valve area by continuity eq 1.86 cm2    MV VTI 37.1 cm    Triscuspid Valve Regurgitation Peak Gradient 38 mmHg    PV PEAK VELOCITY 1.11 m/s    PV peak gradient 5 mmHg    Sinus 3.35 cm    ASI 1.4 cm/m2    STJ 3.1 cm    Ascending aorta 2.8 cm    ASI 1.2 cm/m2    IVC diameter 1.64 cm    " Mean e' 0.04 m/s    ZLVIDS -1.55     ZLVIDD -5.12     LA area A4C 16.53 cm2    LA area A2C 20.73 cm2    TV resting pulmonary artery pressure 41 mmHg    RV TB RVSP 6 mmHg    Est. RA pres 3 mmHg    Narrative      Left Ventricle: The left ventricle is moderately dilated. Mildly   increased wall thickness. Moderate global hypokinesis and regional wall   motion abnormalities present. See diagram for wall motion findings. There   is moderately reduced systolic function with a visually estimated ejection   fraction of 35 - 40%. Quantitated ejection fraction is 42%. Grade II   diastolic dysfunction.    Right Ventricle: The right ventricle is moderately dilated Systolic   function is normal. Pacemaker lead present in the ventricle.    Right Atrium: The right atrium is mildly dilated .    Aortic Valve: The aortic valve is a trileaflet valve. Severely   calcified cusps. There is moderate stenosis. Aortic valve area by VTI is   1.4 cm². Aortic valve peak velocity is 2.4 m/s. Mean gradient is 14 mmHg.   The dimensionless index is 0.31. There is mild aortic regurgitation.    Tricuspid Valve: There is mild regurgitation.    Pulmonary Artery: The estimated pulmonary artery systolic pressure is   41 mmHg.    IVC/SVC: Normal venous pressure at 3 mmHg.       Assessment and Plan:     Brief HPI:       Patient is a 79-year-old male, DNR code status, history of ischemic cardiomyopathy with EF of around 15-20% in the past, CAD with  of the LAD, distal left circumflex and RCA disease in 2018, history of PCI of the RCA, AICD in place, admitted to the hospital with altered mental status/possible syncope. No AICD shocks. No significant ST segment changes on EKG. Denies chest pain. Cardiology consulted for further recommendations.       -diuresed well with IV Lasix.  No significant residual swelling of the feet.  Recommend transitioning to oral Lasix.  -patient has a [employer] single-chamber AICD.  Recommend device interrogation for  completeness.  Denies any AICD shocks in the recent past.  -minimally elevated troponin at 0.06.   Continue dual antiplatelet therapy.  -agree with medical management of ischemic cardiomyopathy with Entresto, Aldactone, Toprol-XL.  - no further recommendations from Cardiology standpoint.  Recommend device interrogation prior to discharge..  Follow up in Cardiology Clinic for management of moderate aortic stenosis.  Likely we will need dobutamine stress echocardiogram on outpatient basis.      Active Diagnoses:    Diagnosis Date Noted POA    PRINCIPAL PROBLEM:  Altered mental state [R41.82] 05/17/2025 Yes    Hyponatremia [E87.1] 05/17/2025 Yes    Acidosis, metabolic [E87.20] 05/17/2025 Yes    Stroke [I63.9] 12/25/2018 Yes    S/P right coronary artery (RCA) stent placement [Z95.5] 03/08/2018 Not Applicable    AICD (automatic cardioverter/defibrillator) present [Z95.810] 02/09/2018 Yes    Combined systolic and diastolic congestive heart failure, NYHA class 3 [I50.40] 05/02/2017 Yes     Chronic      Problems Resolved During this Admission:       VTE Risk Mitigation (From admission, onward)           Ordered     enoxaparin injection 40 mg  Daily         05/17/25 0428     IP VTE HIGH RISK PATIENT  Once         05/17/25 0428     Place sequential compression device  Until discontinued         05/17/25 0428                    Augusto Nelson MD  Cardiology  [facility]

## 2025-05-18 NOTE — PLAN OF CARE
Care of pt assumed from ROSY Jauregui. Pt AAOx4, sitting up in bed. No pain reported throughout shift. Plan of care reviewed. Pt noted to have congested, non- productive cough. PRN mucinex ordered and administered. Pt slept sopundly throughout shift, Neuro checks performed q4h pr orders. No changes noted. Bed extender added to bed.Medications administered per MAR. Safety maintained.     Problem: Adult Inpatient Plan of Care  Goal: Optimal Comfort and Wellbeing  Outcome: Progressing     Problem: Comorbidity Management  Goal: Maintenance of Heart Failure Symptom Control  Outcome: Progressing  Goal: Blood Pressure in Desired Range  Outcome: Progressing     Problem: Skin Injury Risk Increased  Goal: Skin Health and Integrity  Outcome: Progressing

## 2025-05-18 NOTE — PROGRESS NOTES
[facility]  Progress Note    Patient Name: Nimesh Saini  MRN: [id number]  Patient Class: OP- Observation   Admission Date: 2025  Length of Stay: 0 days  Attending Physician: Terrence Ornelas MD  Primary Care Provider: No primary care provider on file.        Subjective:     Principal Problem:Altered mental state      HPI: 79-year-old male with extensive medical history including ischemic cardiomyopathy with an EF of less than 15% and multivessel CAD, presents brought in by EMS for altered mental status.  Patient drove to a gas station this evening, and after several hours of not leaving was noted to be lying on the ground next their car by gas station employees.  He had been on the way to a .      He states he was driving and trying to find a [employer] and it was like he lost control of everything. He  last remembers looking for the [employer].  No bladder or bowel incontinence. No tongue biting.      Mild leg swelling that is new. He has been eating a lot of crawfish and crabs.         No new subjective & objective note has been filed under this hospital service since the last note was generated.    Interval History: no acute event overnight, labs and vitals reviewed. Appreciate cards consult and recs. EEg showed no epilepsy.    Review of Systems      Constitutional: Negative for chills and fever.   HENT:  Negative for hearing loss and nosebleeds.    Eyes:  Negative for blurred vision.   Cardiovascular:         As in HPI    Respiratory:  Negative for cough, hemoptysis and shortness of breath.    Endocrine: Negative for cold intolerance and polyuria.   Hematologic/Lymphatic: Negative for bleeding problem.   Skin:  Negative for itching.   Musculoskeletal:  Negative for falls.   Gastrointestinal:  Negative for abdominal pain and hematochezia.   Genitourinary:  Negative for hematuria.   Neurological:  Negative for dizziness and loss of balance.   Psychiatric/Behavioral:  Negative for  altered mental status and depression.      Objective:     Vital Signs (Most Recent):  Temp: 98.8 °F (37.1 °C) (05/18/25 0750)  Pulse: (!) 50 (05/18/25 0750)  Resp: 16 (05/18/25 0750)  BP: 118/66 (05/18/25 0750)  SpO2: 95 % (05/18/25 0750) Vital Signs (24h Range):  Temp:  [97.9 °F (36.6 °C)-98.8 °F (37.1 °C)] 98.8 °F (37.1 °C)  Pulse:  [44-55] 50  Resp:  [16-21] 16  SpO2:  [92 %-99 %] 95 %  BP: ()/(55-80) 118/66     Weight: 117 kg (257 lb 15 oz)  Body mass index is 34.03 kg/m².    Intake/Output Summary (Last 24 hours) at 5/18/2025 0854  Last data filed at 5/18/2025 0440  Gross per 24 hour   Intake --   Output 975 ml   Net -975 ml      Physical Exam      Constitutional:       Appearance: He is well-developed.   HENT:      Head: Normocephalic and atraumatic.   Eyes:      Conjunctiva/sclera: Conjunctivae normal.   Neck:      Vascular: No carotid bruit or JVD.   Cardiovascular:      Rate and Rhythm: Normal rate and regular rhythm.      Pulses:           Carotid pulses are 2+ on the right side and 2+ on the left side.       Radial pulses are 2+ on the right side and 2+ on the left side.      Heart sounds: Murmur heard.      No friction rub. No gallop.   Pulmonary:      Effort: Pulmonary effort is normal. No respiratory distress.      Breath sounds: Normal breath sounds. No stridor. No wheezing.   Abdominal:      General: Abdomen is flat.      Palpations: Abdomen is soft.   Musculoskeletal:      Cervical back: Neck supple.      Right lower leg: No edema.      Left lower leg: No edema.   Skin:     General: Skin is warm and dry.   Neurological:      Mental Status: He is alert and oriented to person, place, and time.   Psychiatric:         Behavior: Behavior normal.     Significant Labs: All pertinent labs within the past 24 hours have been reviewed.  CBC:   Recent Labs   Lab 05/17/25  0014   WBC 7.73   HGB 16.4   HCT 47.2   PLT 98*     CMP:   Recent Labs   Lab 05/17/25  0131 05/17/25  0454   * 137   K 4.9 5.1     105   CO2 17* 19*   GLU 89 93   BUN 24* 24*   CREATININE 1.4 1.2   CALCIUM 8.5* 8.7   PROT 7.1  --    ALBUMIN 3.6  --    BILITOT 0.8  --    ALKPHOS 58  --    AST 67*  --    ALT 40  --    ANIONGAP 11 13       Significant Imaging: I have reviewed all pertinent imaging results/findings within the past 24 hours.  I have reviewed and interpreted all pertinent imaging results/findings within the past 24 hours.    Assessment/Plan:      Active Diagnoses:    Diagnosis Date Noted POA    PRINCIPAL PROBLEM:  Altered mental state [R41.82] 05/17/2025 Yes    Hyponatremia [E87.1] 05/17/2025 Yes    Acidosis, metabolic [E87.20] 05/17/2025 Yes    Stroke [I63.9] 12/25/2018 Yes    S/P right coronary artery (RCA) stent placement [Z95.5] 03/08/2018 Not Applicable    AICD (automatic cardioverter/defibrillator) present [Z95.810] 02/09/2018 Yes    Combined systolic and diastolic congestive heart failure, NYHA class 3 [I50.40] 05/02/2017 Yes     Chronic      Problems Resolved During this Admission:     VTE Risk Mitigation (From admission, onward)           Ordered     enoxaparin injection 40 mg  Daily         05/17/25 0428     IP VTE HIGH RISK PATIENT  Once         05/17/25 0428     Place sequential compression device  Until discontinued         05/17/25 0428                  * Altered mental state  Seems back to baseline currently. He is AO, conversant.   Seems he was lost or down too long to meet the classic definition of syncope. No known ingestions, tox negative. No apparent seizure activity.  -Cardiac monitoring  -EEG - showed no epilepsy  -Echocardiogram  -repeat troponin  -CT head unremarkable  -Neuro checks q4hrs  -interrogate pacemaker     Combined systolic and diastolic congestive heart failure, NYHA class 3  Patient has Combined Systolic and Diastolic heart failure that is Chronic. On presentation their CHF was well compensated. Most recent BNP and echo results are listed below.      Recent Labs     05/17/25  0014   BNP  130*      Latest ECHO  Results for orders placed during the hospital encounter of 01/05/25     Echo     Interpretation Summary    Left Ventricle: The left ventricle is normal in size. There is concentric hypertrophy. Regional wall motion abnormalities present. See diagram for wall motion findings. There is reduced systolic function. Quantitated ejection fraction is 36%. Unable to assess diastolic function due to poor image quality.    Right Ventricle: Normal right ventricular cavity size. Systolic function is normal. Pacemaker lead present in the ventricle.    Aortic Valve: There is aortic valve sclerosis. Mildly restricted motion. There is mild aortic regurgitation.    Mitral Valve: There is mild regurgitation.    Pulmonary Artery: The estimated pulmonary artery systolic pressure is 19 mmHg.    IVC/SVC: Normal venous pressure at 3 mmHg.     Current Heart Failure Medications  metoprolol succinate (TOPROL-XL) 24 hr tablet 100 mg, Daily, Oral  sacubitriL-valsartan  mg per tablet 1 tablet, 2 times daily, Oral  spironolactone tablet 25 mg, Daily, Oral     Plan  - Monitor strict I&Os and daily weights.    - Place on telemetry  - Low sodium diet  - Place on fluid restriction of 2 L.   - Cardiology has not been consulted  - The patient's volume status is at their baseline  - slight LE edema that is new with elevated BNP. In the setting of AMS and found down, will hold on diuresis pending echo.   -cont entresto, metoprolol, spironolactone        Acidosis, metabolic  Unclear etiology other than found down  -renal function at baseline  -Non anion gap  -repeat lactate and bmp  -not a known diabetic        AICD (automatic cardioverter/defibrillator) present  Chronic  -interrogate in the setting of found down to rule out cardiac event.      S/P right coronary artery (RCA) stent placement  Chonic, stable  -cardiac monitoring  -slight elevation of troponin  -repeat troponin     Stroke  chronic  Antithrombotics for secondary  stroke prevention: Antiplatelets: Aspirin: 81 mg daily  Clopidogrel: 75 mg daily     Statins for secondary stroke prevention and hyperlipidemia, if present:   Statins: Statins contraindicated due to allergy     Aggressive risk factor modification: HTN, HLD, Diet, Obesity, CAD     Rehab efforts: The patient has been evaluated by a stroke team provider and the therapy needs have been fully considered based off the presenting complaints and exam findings. The following therapy evaluations are needed: None     Diagnostics ordered/pending: CT scan of head without contrast to asses brain parenchyma     VTE prophylaxis: Heparin 5000 units SQ every 8 hours     BP parameters: none        Hyponatremia  Hyponatremia is likely due to Dehydration/hypovolemia. The patient's most recent sodium results are listed below.      Recent Labs     05/17/25  0131   *      Plan  - Correct the sodium by 4-6mEq in 24 hours.   - Obtain the following studies: none.  - Will treat the hyponatremia with IV fluids as follows: 1L in ed  - Monitor sodium Daily.   - Patient hyponatremia is stable  - mild     Code - DNR       Terrence Ornelas MD  Department of Hospital Medicine   [facility]

## 2025-05-18 NOTE — NURSING
VIRTUAL NURSE: Pt arrived to unit. Permission received to turn camera to view patient. VIP model explained  Admission questions completed.  Plan of care reviewed with patient. Educated patient on fall risk/POC. Call light within reach, side rails up x2. Patient instucted to call staff for assistance.     05/17/25 9842   Admission Complete   Admission Complete by VN Complete

## 2025-05-19 PROBLEM — G93.40 ACUTE ENCEPHALOPATHY: Status: ACTIVE | Noted: 2025-05-19

## 2025-05-19 PROBLEM — E87.1 HYPONATREMIA: Status: RESOLVED | Noted: 2025-05-17 | Resolved: 2025-05-19

## 2025-05-19 LAB
ALLENS TEST: YES
FIO2: 28 %
LPM: 2
PCO2 BLDA: 38.6 MMHG (ref 35–45)
PH SMN: 7.42 [PH] (ref 7.35–7.45)
PO2 BLDA: 76.4 MMHG (ref 80–100)
POC BASE DEFICIT: 0.7 MMOL/L (ref -2–2)
POC HCO3: 25.1 MMOL/L (ref 24–28)
POC PERFORMED BY: ABNORMAL
POC SATURATED O2: 95.8 % (ref 95–100)
SPECIMEN SOURCE: ABNORMAL

## 2025-05-19 PROCEDURE — 36600 WITHDRAWAL OF ARTERIAL BLOOD: CPT

## 2025-05-19 PROCEDURE — 27000221 HC OXYGEN, UP TO 24 HOURS

## 2025-05-19 PROCEDURE — 97116 GAIT TRAINING THERAPY: CPT

## 2025-05-19 PROCEDURE — 97530 THERAPEUTIC ACTIVITIES: CPT

## 2025-05-19 PROCEDURE — 0241U SARS-COV2 (COVID) WITH FLU/RSV BY PCR: CPT | Performed by: FAMILY MEDICINE

## 2025-05-19 PROCEDURE — 36415 COLL VENOUS BLD VENIPUNCTURE: CPT | Performed by: FAMILY MEDICINE

## 2025-05-19 PROCEDURE — 82803 BLOOD GASES ANY COMBINATION: CPT

## 2025-05-19 PROCEDURE — 97161 PT EVAL LOW COMPLEX 20 MIN: CPT

## 2025-05-19 PROCEDURE — 87040 BLOOD CULTURE FOR BACTERIA: CPT | Performed by: FAMILY MEDICINE

## 2025-05-19 PROCEDURE — 25000003 PHARM REV CODE 250: Performed by: STUDENT IN AN ORGANIZED HEALTH CARE EDUCATION/TRAINING PROGRAM

## 2025-05-19 PROCEDURE — 97165 OT EVAL LOW COMPLEX 30 MIN: CPT

## 2025-05-19 PROCEDURE — 21400001 HC TELEMETRY ROOM

## 2025-05-19 PROCEDURE — 99900035 HC TECH TIME PER 15 MIN (STAT)

## 2025-05-19 PROCEDURE — 63600175 PHARM REV CODE 636 W HCPCS: Performed by: INTERNAL MEDICINE

## 2025-05-19 PROCEDURE — 99233 SBSQ HOSP IP/OBS HIGH 50: CPT | Mod: ,,, | Performed by: NURSE PRACTITIONER

## 2025-05-19 PROCEDURE — 94761 N-INVAS EAR/PLS OXIMETRY MLT: CPT

## 2025-05-19 PROCEDURE — 25000003 PHARM REV CODE 250: Performed by: INTERNAL MEDICINE

## 2025-05-19 RX ORDER — METOPROLOL SUCCINATE 50 MG/1
50 TABLET, EXTENDED RELEASE ORAL DAILY
Status: DISCONTINUED | OUTPATIENT
Start: 2025-05-20 | End: 2025-05-24

## 2025-05-19 RX ADMIN — GUAIFENESIN 200 MG: 100 SOLUTION ORAL at 10:05

## 2025-05-19 RX ADMIN — CYANOCOBALAMIN TAB 1000 MCG 1000 MCG: 1000 TAB at 08:05

## 2025-05-19 RX ADMIN — GUAIFENESIN 200 MG: 100 SOLUTION ORAL at 08:05

## 2025-05-19 RX ADMIN — MELATONIN TAB 3 MG 6 MG: 3 TAB at 08:05

## 2025-05-19 RX ADMIN — PANTOPRAZOLE SODIUM 40 MG: 40 TABLET, DELAYED RELEASE ORAL at 08:05

## 2025-05-19 RX ADMIN — ENOXAPARIN SODIUM 40 MG: 40 INJECTION SUBCUTANEOUS at 05:05

## 2025-05-19 RX ADMIN — SPIRONOLACTONE 25 MG: 25 TABLET, FILM COATED ORAL at 08:05

## 2025-05-19 RX ADMIN — SACUBITRIL AND VALSARTAN 1 TABLET: 97; 103 TABLET, FILM COATED ORAL at 08:05

## 2025-05-19 RX ADMIN — CLOPIDOGREL BISULFATE 75 MG: 75 TABLET, FILM COATED ORAL at 08:05

## 2025-05-19 RX ADMIN — ASPIRIN 81 MG: 81 TABLET, COATED ORAL at 08:05

## 2025-05-19 NOTE — ASSESSMENT & PLAN NOTE
Hyponatremia is likely due to Dehydration/hypovolemia. The patient's most recent sodium results are listed below.  Recent Labs     05/17/25  0131 05/17/25  0454   * 137     Plan  - Correct the sodium by 4-6mEq in 24 hours.   - Obtain the following studies: none.  - Will treat the hyponatremia with IV fluids as follows: 1L in ed  - Monitor sodium Daily.   - Patient hyponatremia is stable  - mild

## 2025-05-19 NOTE — PT/OT/SLP EVAL
Occupational Therapy   Evaluation    Name: Nimesh Saini  MRN: [id number]  Admitting Diagnosis: Altered mental state  Recent Surgery: * No surgery found *      Recommendations:     Discharge Recommendations: High Intensity Therapy  Discharge Equipment Recommendations:  to be determined by next level of care  Barriers to discharge:  Other (Comment), Decreased caregiver support    Assessment:     Nimesh Saini is a 79 y.o. male with a medical diagnosis of Altered mental state.  He presents with The primary encounter diagnosis was Acute encephalopathy. Diagnoses of Syncope and collapse, History of cardiomyopathy, Chronic combined systolic and diastolic congestive heart failure, NYHA class 3, AICD (automatic cardioverter/defibrillator) present, CAD, multiple vessel, Troponin level elevated, Disorientation, and Primary hypertension were also pertinent to this visit. Performance deficits affecting function: weakness, gait instability, impaired endurance, impaired functional mobility, decreased upper extremity function, decreased lower extremity function, impaired self care skills, impaired balance, decreased ROM, decreased coordination, decreased safety awareness.      Rehab Prognosis: Good; patient would benefit from acute skilled OT services to address these deficits and reach maximum level of function.       Plan:     Patient to be seen 5 x/week to address the above listed problems via self-care/home management, therapeutic activities, therapeutic exercises  Plan of Care Expires: 06/19/25  Plan of Care Reviewed with: patient    Subjective     Chief Complaint: pt reports not feeling at baseline  Patient/Family Comments/goals: return to PLOF    Occupational Profile:  Living Environment: Patient lives alone in a 2 story home with number of outside stair(s): 0, number of inside stair(s): flight of stairs to bedroom, bed/bath on 2nd floor, and can live on 1st floor  Prior Level of Function: Prior to admission, patient was  independent and driving and retired, active in garden, endorses a fall every four weeks lately due to syncopal episodes  Equipment Used at Home: none (pt reports none)  DME owned (not currently used): none  Assistance Upon Discharge: unknown    Pain/Comfort:  Pain Rating 1: 0/10    Patients cultural, spiritual, Mandaen conflicts given the current situation: no    Objective:     Communicated with: ra prior to session.  Patient found HOB elevated with oxygen, telemetry upon OT entry to room.    General Precautions: Standard, fall  Orthopedic Precautions: N/A  Braces: N/A  Respiratory Status: Nasal cannula, flow 2 L/min    Occupational Performance:    Bed Mobility:    Patient completed Scooting/Bridging with minimum assistance  Patient completed Supine to Sit with maximal assistance    Functional Mobility/Transfers:  Patient completed Sit <> Stand Transfer with minimum assistance  with  rolling walker   Patient completed Bed <> Chair Transfer using Step Transfer technique with minimum assistance with rolling walker and increased v/cs for proximity to RW and to not lift off ground    Cognitive/Visual Perceptual:  Cognitive/Psychosocial Skills:     -       Oriented to: Person, Place, Time, and Situation   -       Mood/Affect/Coping skills/emotional control: Flat affect    Physical Exam:  Upper Extremity Range of Motion:     -       Right Upper Extremity: WFL  -       Left Upper Extremity: WFL  Upper Extremity Strength:    -       Right Upper Extremity: WFL  -       Left Upper Extremity: WFL   Strength:    -       Right Upper Extremity: WFL  -       Left Upper Extremity: WFL    AMPAC 6 Click ADL:  AMPAC Total Score: 16    Treatment & Education:  Pt would benefit from cont OT services in order to maximize functional independence.   At baseline pt is independent and very active, works in his garden at home, drives.   Pt reports not feeling back to his baseline at this time.   Pt requires Ya with increased time &  use of RW for functional mobility in room.   Will progress as able.    Patient left up in chair with all lines intact, call button in reach, chair alarm on, and nsg notified    GOALS:   Multidisciplinary Problems       Occupational Therapy Goals          Problem: Occupational Therapy    Goal Priority Disciplines Outcome Interventions   Occupational Therapy Goal     OT, PT/OT Progressing    Description: Goals to be met by: 06/19/2025     Patient will increase functional independence with ADLs by performing:    UE Dressing with Modified [place].  LE Dressing with Modified [place].  Grooming while standing with Modified [place].  Toileting from toilet with Modified [place] for hygiene and clothing management.   Step transfer with Modified [place]  Toilet transfer to toilet with Modified [place].                         History:     Past Medical History:   Diagnosis Date    Cardiomyopathy     ICM EF 15%    CHF (congestive heart failure)     chronic combined     Coronary artery disease     3 vessel    Diverticulitis large intestine 08/2014    GERD (gastroesophageal reflux disease)     Hypertension     Obesity          Past Surgical History:   Procedure Laterality Date    CARDIAC DEFIBRILLATOR PLACEMENT Left 09/2017    CORONARY ANGIOPLASTY WITH STENT PLACEMENT  06/2017    coronary angiogram; stents x 5    UMBILICAL HERNIA REPAIR      1990's       Time Tracking:     OT Date of Treatment: 05/19/25  OT Start Time: 1556  OT Stop Time: 1613  OT Total Time (min): 17 min    Billable Minutes:Evaluation 8  Therapeutic Activity 9    5/19/2025

## 2025-05-19 NOTE — PLAN OF CARE
Problem: Physical Therapy  Goal: Physical Therapy Goal  Description: Goals to be met by: 25     Patient will increase functional independence with mobility by performin. Supine to sit with Stand-by Assistance  2. Sit to supine with Stand-by Assistance  3. Sit to stand transfer with Stand-by Assistance  4. Bed to chair transfer with Stand-by Assistance using Rolling Walker  5. Gait  x 50 feet with Stand-by Assistance using Rolling Walker.   6. Ascend/descend 1 flight of stairs with 1-2 Handrails Contact Guard Assistance using No Assistive Device.     Outcome: Progressing     PT Eval completed, note to follow.     Pt AAO x 4, however, lethargic initially which improved during session. Pt with flat affect and slow initiation/movement but follows all motor commands. Pt reports he was independent, active, and driving prior to this event. Pt is now requiring significant assist for functional mobility. Despite patient's co-morbidities,  patient was living in community setting functioning at Independent level for ADLs, and mobility prior to this event.  There is an expectation of returning to prior level of function to maintain independence thus avoiding readmission.  Patient's clinical condition meets full high intensity therapy criteria; including the ability to actively participate in 3 hours of therapy.  A lower level of care cannot provide the interdisciplinary treatment approach needed.

## 2025-05-19 NOTE — ASSESSMENT & PLAN NOTE
- reports of being found altered on the ground for several hours  - interrogation with no events; Neurology consulted   - CT head with no acute findings; MRI and MRA recommended per Neurology- previous angiogram of carotids with disease medical management in process

## 2025-05-19 NOTE — ASSESSMENT & PLAN NOTE
-CT head unremarkable  -interrogate pacemaker  EEG was performed to assess for subclinical seizures   IMPRESSION:  This is an abnormal EEG during wakefulness, drowsiness and sleep. Rare left temporal focal mixed delta range slowing was noted    Consult neurology  Check ABG for CO2 level  Infectious w/u in progress

## 2025-05-19 NOTE — CARE UPDATE
05/18/25 1922   Patient Assessment/Suction   Level of Consciousness (AVPU) alert   Respiratory Effort Normal;Unlabored   Expansion/Accessory Muscles/Retractions no retractions;no use of accessory muscles   Skin Integrity   $ Wound Care Tech Time 15 min   Area Observed Behind ear   Skin Appearance without discoloration   PRE-TX-O2   Device (Oxygen Therapy) nasal cannula   $ Is the patient on Low Flow Oxygen? Yes   Flow (L/min) (Oxygen Therapy) 2   SpO2 (!) 94 %   Pulse Oximetry Type Intermittent   $ Pulse Oximetry - Multiple Charge Pulse Oximetry - Multiple   Pulse (!) 59   Resp 20   Respiratory Evaluation   $ Care Plan Tech Time 15 min

## 2025-05-19 NOTE — PROGRESS NOTES
[facility]  Cardiology  Progress Note    Patient Name: Nimesh Saini  MRN: [id number]  Admission Date: 5/16/2025  Hospital Length of Stay: 1 days  Code Status: DNR   Attending Physician: Staci Millard MD   Primary Care Physician: No primary care provider on file.  Expected Discharge Date: 5/20/2025  Principal Problem:Altered mental state    Subjective:     Hospital Course:   Per consult note by Dr Nelson 5/18/2025 79-year-old male, DNR code status, history of ischemic cardiomyopathy with EF of around 15-20% in the past, CAD with  of the LAD, distal left circumflex and RCA disease in 2018, history of PCI of the RCA, AICD in place, admitted to the hospital with altered mental status/possible syncope.  No AICD shocks.  No significant ST segment changes on EKG.  Denies chest pain.  Cardiology consulted for further recommendations.    -diuresed well with IV Lasix.  No significant residual swelling of the feet.  Recommend transitioning to oral Lasix.  -patient has a Medtronic single-chamber AICD.  Recommend device interrogation for completeness.  Denies any AICD shocks in the recent past.  -minimally elevated troponin at 0.06.   Continue dual antiplatelet therapy.  -agree with medical management of ischemic cardiomyopathy with Entresto, Aldactone, Toprol-XL.  - no further recommendations from Cardiology standpoint.  Recommend device interrogation prior to discharge..  Follow up in Cardiology Clinic for management of moderate aortic stenosis.  Likely we will need dobutamine stress echocardiogram on outpatient basis.     5/19/2025 last CBC and BMP reviewed from 5/17. HR and BP stable. No arrhythmias noted on telemetry Tmax 100.1 overnight.            Review of Systems   Unable to perform ROS: Other     Objective:     Vital Signs (Most Recent):  Temp: 98.8 °F (37.1 °C) (05/19/25 1218)  Pulse: (!) 56 (05/19/25 1305)  Resp: 18 (05/19/25 1218)  BP: 122/79 (05/19/25 1218)  SpO2: 95 % (05/19/25 1218) Vital Signs  (24h Range):  Temp:  [98 °F (36.7 °C)-101 °F (38.3 °C)] 98.8 °F (37.1 °C)  Pulse:  [55-63] 56  Resp:  [17-22] 18  SpO2:  [91 %-96 %] 95 %  BP: (104-142)/(65-79) 122/79     Weight: 117 kg (257 lb 15 oz)  Body mass index is 34.03 kg/m².     SpO2: 95 %         Intake/Output Summary (Last 24 hours) at 5/19/2025 1403  Last data filed at 5/19/2025 0401  Gross per 24 hour   Intake --   Output 375 ml   Net -375 ml       Lines/Drains/Airways       Peripheral Intravenous Line  Duration                  Peripheral IV - Single Lumen 05/16/25 20 G Yes Anterior;Proximal;Right Forearm 3 days         Peripheral IV - Single Lumen 05/17/25 0510 20 G No Left Wrist 2 days                       Physical Exam  Constitutional:       General: He is not in acute distress.     Appearance: He is well-developed.   Cardiovascular:      Rate and Rhythm: Normal rate and regular rhythm.      Heart sounds: No murmur heard.     No gallop.   Pulmonary:      Effort: Pulmonary effort is normal. No respiratory distress.      Breath sounds: Normal breath sounds. No wheezing.   Abdominal:      General: Bowel sounds are normal. There is no distension.      Palpations: Abdomen is soft.      Tenderness: There is no abdominal tenderness.   Skin:     General: Skin is warm and dry.                Significant Imaging: Echocardiogram: Transthoracic echo (TTE) complete (Cupid Only):   Results for orders placed or performed during the hospital encounter of 05/16/25   Echo Saline Bubble? No; Ultrasound enhancing contrast? Yes   Result Value Ref Range    BSA 2.46 m2    Jose's Biplane MOD Ejection Fraction 42 %    A2C EF 43 %    A4C EF 42 %    LVOT stroke volume 69.2 cm3    LVIDd 6.7 (A) 3.5 - 6.0 cm    LV Systolic Volume 146 mL    LV Systolic Volume Index 60.6 mL/m2    LVIDs 5.5 (A) 2.1 - 4.0 cm    LV ESV A2C 64.37 mL    LV Diastolic Volume 230 mL    LV ESV A4C 42.55 mL    LV Diastolic Volume Index 95.44 mL/m2    LV EDV A2C 268.753674173841062 mL    LV EDV A4C  "242.14 mL    Left Ventricular End Systolic Volume by Teichholz Method 145.87 mL    Left Ventricular End Diastolic Volume by Teichholz Method 229.81 mL    IVS 1.1 0.6 - 1.1 cm    LVOT diameter 2.4 cm    LVOT area 4.5 cm2    FS 17.9 (A) 28 - 44 %    Left Ventricle Relative Wall Thickness 0.21 cm    PW 0.7 0.6 - 1.1 cm    LV mass 261.3 g    LV Mass Index 108.4 g/m2    MV Peak E Jonel 0.58 m/s    TDI LATERAL 0.04 m/s    TDI SEPTAL 0.04 m/s    E/E' ratio 15 m/s    MV Peak A Jonel 0.83 m/s    TR Max Jonel 3.1 m/s    E/A ratio 0.70     IVRT 114 msec    E wave deceleration time 225 msec    MV "A" wave duration 117.244544508110992 msec    LV SEPTAL E/E' RATIO 14.5 m/s    LV LATERAL E/E' RATIO 14.5 m/s    LVOT peak jonel 0.8 m/s    Left Ventricular Outflow Tract Mean Velocity 0.60 cm/s    Left Ventricular Outflow Tract Mean Gradient 1.56 mmHg    RV- ramon basal diam 4.8 cm    RV S' 15.93 cm/s    TAPSE 2.9 cm    RV/LV Ratio 0.72 cm    LA Vol (MOD) 54 mL    MARGO (MOD) 22 mL/m2    RA area length vol 56.91 mL    RA Area 21.2 cm2    RA Vol 62.12 mL    AV regurgitation pressure 1/2 time 744 ms    AR Max Jonel 3.79 m/s    AV mean gradient 14 mmHg    AV peak gradient 23 mmHg    Ao peak jonel 2.4 m/s    Ao VTI 49.3 cm    LVOT peak VTI 15.3 cm    AV valve area 1.4 cm²    AV Velocity Ratio 0.33     AV index (prosthetic) 0.31     NILESH by Velocity Ratio 1.5 cm²    Mr max jonel 3.35 m/s    MV mean gradient 1 mmHg    MV peak gradient 4 mmHg    MV stenosis pressure 1/2 time 65.22 ms    MV valve area p 1/2 method 3.37 cm2    MV valve area by continuity eq 1.86 cm2    MV VTI 37.1 cm    Triscuspid Valve Regurgitation Peak Gradient 38 mmHg    PV PEAK VELOCITY 1.11 m/s    PV peak gradient 5 mmHg    Sinus 3.35 cm    ASI 1.4 cm/m2    STJ 3.1 cm    Ascending aorta 2.8 cm    ASI 1.2 cm/m2    IVC diameter 1.64 cm    Mean e' 0.04 m/s    ZLVIDS -1.55     ZLVIDD -5.12     LA area A4C 16.53 cm2    LA area A2C 20.73 cm2    TV resting pulmonary artery pressure 41 mmHg    " RV TB RVSP 6 mmHg    Est. RA pres 3 mmHg    Narrative      Left Ventricle: The left ventricle is moderately dilated. Mildly   increased wall thickness. Moderate global hypokinesis and regional wall   motion abnormalities present. See diagram for wall motion findings. There   is moderately reduced systolic function with a visually estimated ejection   fraction of 35 - 40%. Quantitated ejection fraction is 42%. Grade II   diastolic dysfunction.    Right Ventricle: The right ventricle is moderately dilated Systolic   function is normal. Pacemaker lead present in the ventricle.    Right Atrium: The right atrium is mildly dilated .    Aortic Valve: The aortic valve is a trileaflet valve. Severely   calcified cusps. There is moderate stenosis. Aortic valve area by VTI is   1.4 cm². Aortic valve peak velocity is 2.4 m/s. Mean gradient is 14 mmHg.   The dimensionless index is 0.31. There is mild aortic regurgitation.    Tricuspid Valve: There is mild regurgitation.    Pulmonary Artery: The estimated pulmonary artery systolic pressure is   41 mmHg.    IVC/SVC: Normal venous pressure at 3 mmHg.       Assessment and Plan:     Brief HPI: Seen this morning on AM NP rounds while resting in bed with son and grandson at the bedside. Sleepy but awakens to verbal command but unstimulated falls asleep     * Altered mental state  - reports of being found altered on the ground for several hours  - interrogation with no events; Neurology consulted   - CT head with no acute findings; MRI and MRA recommended per Neurology- previous angiogram of carotids with disease medical management in process     Combined systolic and diastolic congestive heart failure, NYHA class 3  - echo with EF 35-40% and grade II diastolic dysfunction  - admitted with syncope   - ; AICD interrogation with no events noted   - on Aldactone, Entresto 97/103 and Toprol 50 and continued while admitted; SBP 100s-130s; son reports transition of oral lasix to prn  use as an outpatient   - will continue current regimen     CAD, multiple vessel  - followed by Dr. Hawk Avendano  - history of CAD with  of the LAD, distal left circumflex and RCA disease in 2018, history of PCI of the RCA  - continue ASA,Plavix and BB- CAD medically managed and appears stable at this time        VTE Risk Mitigation (From admission, onward)           Ordered     enoxaparin injection 40 mg  Daily         05/17/25 0428     IP VTE HIGH RISK PATIENT  Once         05/17/25 0428     Place sequential compression device  Until discontinued         05/17/25 0428                    JONAH Johns, ANP  Cardiology  [facility]

## 2025-05-19 NOTE — PT/OT/SLP EVAL
Physical Therapy Evaluation and Treatment    Patient Name: Nimesh Saini   MRN: [id number]  Recent Surgery: * No surgery found *      Recommendations:     Discharge Recommendations: High Intensity Therapy   Discharge Equipment Recommendations: to be determined by next level of care   Barriers to discharge: Increased level of assist, Decreased caregiver support, Ongoing medical treatment, and fall risk    Assessment:     Nimesh Saini is a 79 y.o. male admitted with a medical diagnosis of Altered mental state. He presents with the following impairments/functional limitations: weakness, gait instability, impaired balance, impaired endurance, impaired self care skills, impaired functional mobility, decreased coordination, impaired coordination, decreased safety awareness.   Pt AAO x 4, however, lethargic initially which improved during session. Pt with flat affect and slow initiation/movement but follows all motor commands. Pt reports he was independent, active, and driving prior to this event. Pt is now requiring significant assist for functional mobility. Despite patient's co-morbidities,  patient was living in community setting functioning at Independent level for ADLs, and mobility prior to this event.  There is an expectation of returning to prior level of function to maintain independence thus avoiding readmission.  Patient's clinical condition meets full high intensity therapy criteria; including the ability to actively participate in 3 hours of therapy.  A lower level of care cannot provide the interdisciplinary treatment approach needed.    Rehab Prognosis: Good; patient would benefit from acute PT services to address these deficits and reach maximum level of function.    Plan:     During this hospitalization, patient to be seen 5 x/week to address the above listed problems via gait training, therapeutic activities, therapeutic exercises, neuromuscular re-education    Plan of Care Expires: 06/19/25    Subjective      Chief Complaint: lethargic  Patient Comments/Goals: pt endorses not feeling at his baseline  Pain/Comfort:  Pain Rating 1: 0/10  Pain Rating Post-Intervention 1: 0/10    Social History:  Living Environment: Patient lives alone in a 2 story home with number of outside stair(s): 0, number of inside stair(s): flight of stairs to bedroom, bed/bath on 2nd floor, and can live on 1st floor  Prior Level of Function: Prior to admission, patient was independent and driving and retired, active in garden, endorses a fall every four weeks lately due to syncopal episodes  Equipment Used at Home: none (pt reports none)  DME owned (not currently used): none  Assistance Upon Discharge: unknown    Objective:     Communicated with nsg prior to session. Patient found HOB elevated with oxygen, telemetry upon PT entry to room.    General Precautions: Standard, fall   Orthopedic Precautions: N/A   Braces: N/A    Respiratory Status: Nasal cannula, flow 2.5 L/min    Exams:  Cognition: Patient is oriented to Person, Place, Time, Situation and flat, slow processing speed, slow motor sequencing and initiation of task  RLE ROM: WFL  RLE Strength: WFL  LLE ROM: WFL  LLE Strength: WFL  Sensation:    -       Intact  Skin Integrity/Edema:     -       Skin integrity: Visible skin intact  -       Edema: None noted BLE    Functional Mobility:  Gait belt applied - Yes  Bed Mobility  Scooting: minimum assistance  Supine to Sit: maximal assistance for LE management and trunk management  Transfers  Sit to Stand: minimum assistance with rolling walker and with cues for hand placement  Bed to Chair: minimum assistance with rolling walker and with cues for hand placement using Step Transfer  Gait  Patient ambulated 10 ft with rolling walker and minimum assistance. Patient demonstrates decreased step length, wide base of support, decreased foot clearance, ambulates outside ABHI of RW, flexed posture, and decreased yordan. Assist required for RW management  and safe sequencing, mod cues to remain within ABHI of RW. All lines remained intact throughout ambulation trail and portable Supplemental O2 2.5L utilized.  Balance  Sitting: stand by assistance  Standing: minimum assistance due to mild posterior lean      Therapeutic Activities and Exercises:   Patient educated on role of acute care PT and PT POC, safety while in hospital including calling nurse for mobility, and call light usage  Patient educated about importance of OOB mobility and remaining up in chair most of the day.  Encouraged OOB in chair 1-2 hours and to call for assistance for back to bed and/or toileting needs. Pt verbalizes understanding.        AM-PAC 6 CLICK MOBILITY  Total Score:15    Patient left up in chair with all lines intact, call button in reach, RN notified, and chair alarm on.    GOALS:   Multidisciplinary Problems       Physical Therapy Goals          Problem: Physical Therapy    Goal Priority Disciplines Outcome Interventions   Physical Therapy Goal     PT, PT/OT Progressing    Description: Goals to be met by: 25     Patient will increase functional independence with mobility by performin. Supine to sit with Stand-by Assistance  2. Sit to supine with Stand-by Assistance  3. Sit to stand transfer with Stand-by Assistance  4. Bed to chair transfer with Stand-by Assistance using Rolling Walker  5. Gait  x 50 feet with Stand-by Assistance using Rolling Walker.   6. Ascend/descend 1 flight of stairs with 1-2 Handrails Contact Guard Assistance using No Assistive Device.                            History:     Past Medical History:   Diagnosis Date    Cardiomyopathy     ICM EF 15%    CHF (congestive heart failure)     chronic combined     Coronary artery disease     3 vessel    Diverticulitis large intestine 2014    GERD (gastroesophageal reflux disease)     Hypertension     Obesity        Past Surgical History:   Procedure Laterality Date    CARDIAC DEFIBRILLATOR PLACEMENT Left  09/2017    CORONARY ANGIOPLASTY WITH STENT PLACEMENT  06/2017    coronary angiogram; stents x 5    UMBILICAL HERNIA REPAIR      1990's       Time Tracking:     PT Received On: 05/19/25  PT Start Time: 1550  PT Stop Time: 1615  PT Total Time (min): 25 min with OT    Billable Minutes: Evaluation 9, Gait Training 8, and Therapeutic Activity 8    5/19/2025

## 2025-05-19 NOTE — PROGRESS NOTES
[facility] Medicine  Progress Note    Patient Name: Nimesh Saini  MRN: [id number]  Patient Class: IP- Inpatient   Admission Date: 2025  Length of Stay: 1 days  Attending Physician: Staci Millard MD  Primary Care Provider: No primary care provider on file.        Subjective     Principal Problem:Altered mental state        HPI:  79-year-old male with extensive medical history including ischemic cardiomyopathy with an EF of less than 15% and multivessel CAD, presents brought in by EMS for altered mental status.  Patient drove to a gas station this evening, and after several hours of not leaving was noted to be lying on the ground next their car by gas station employees.  He had been on the way to a .     He states he was driving and trying to find a [employer] and it was like he lost control of everything. He  last remembers looking for the [employer].  No bladder or bowel incontinence. No tongue biting.     Mild leg swelling that is new. He has been eating a lot of crawfish and crabs.       Overview/Hospital Course:  No notes on file    Interval History: NAEON. Patient says he has been fine. No acute concerns. Says he is in the hospital because he passed out, but does not recall what happened.     Review of Systems   Constitutional:  Negative for fever (but has been intermittently febrile since yesterday).   Respiratory:  Negative for shortness of breath.    Cardiovascular:  Negative for chest pain.   Gastrointestinal:  Negative for abdominal pain.   Genitourinary:  Negative for dysuria.     Objective:     Vital Signs (Most Recent):  Temp: 98.8 °F (37.1 °C) (25 1218)  Pulse: (!) 55 (25 1218)  Resp: 18 (25 1218)  BP: 122/79 (25 1218)  SpO2: 95 % (25 1218) Vital Signs (24h Range):  Temp:  [98 °F (36.7 °C)-101 °F (38.3 °C)] 98.8 °F (37.1 °C)  Pulse:  [55-63] 55  Resp:  [17-22] 18  SpO2:  [91 %-96 %] 95 %  BP: (104-142)/(65-79) 122/79     Weight: 117 kg (257 lb 15  "oz)  Body mass index is 34.03 kg/m².    Intake/Output Summary (Last 24 hours) at 5/19/2025 1222  Last data filed at 5/19/2025 0401  Gross per 24 hour   Intake --   Output 575 ml   Net -575 ml         Physical Exam  Vitals and nursing note reviewed.   Constitutional:       General: He is not in acute distress.     Appearance: He is well-developed. He is obese.   HENT:      Head: Normocephalic and atraumatic.   Eyes:      Conjunctiva/sclera: Conjunctivae normal.   Neck:      Vascular: No JVD.   Cardiovascular:      Rate and Rhythm: Normal rate and regular rhythm.      Heart sounds: Normal heart sounds.   Pulmonary:      Effort: Pulmonary effort is normal.      Breath sounds: Normal breath sounds.      Comments: Coarse R>L  Abdominal:      General: Bowel sounds are normal. There is no distension.      Palpations: Abdomen is soft.      Tenderness: There is no abdominal tenderness.   Musculoskeletal:      Cervical back: Neck supple.      Right lower leg: No edema.      Left lower leg: No edema.   Neurological:      Mental Status: He is lethargic.   Psychiatric:         Behavior: Behavior normal.               Significant Labs: All pertinent labs within the past 24 hours have been reviewed.  CBC: No results for input(s): "WBC", "HGB", "HCT", "PLT" in the last 48 hours.  CMP: No results for input(s): "NA", "K", "CL", "CO2", "GLU", "BUN", "CREATININE", "CALCIUM", "PROT", "ALBUMIN", "BILITOT", "ALKPHOS", "AST", "ALT", "ANIONGAP", "EGFRNONAA" in the last 48 hours.    Invalid input(s): "ESTGFAFRICA"    Significant Imaging: I have reviewed all pertinent imaging results/findings within the past 24 hours.        Assessment & Plan  Altered mental state  -CT head unremarkable  -interrogate pacemaker  EEG was performed to assess for subclinical seizures   IMPRESSION:  This is an abnormal EEG during wakefulness, drowsiness and sleep. Rare left temporal focal mixed delta range slowing was noted    Consult neurology  Check ABG for CO2 " level  Infectious w/u in progress    Combined systolic and diastolic congestive heart failure, NYHA class 3  Patient has Combined Systolic and Diastolic heart failure that is Chronic. On presentation their CHF was well compensated. Most recent BNP and echo results are listed below.  Recent Labs     05/17/25  0014   *     Latest ECHO  Results for orders placed during the hospital encounter of 01/05/25    Echo    Interpretation Summary    Left Ventricle: The left ventricle is normal in size. There is concentric hypertrophy. Regional wall motion abnormalities present. See diagram for wall motion findings. There is reduced systolic function. Quantitated ejection fraction is 36%. Unable to assess diastolic function due to poor image quality.    Right Ventricle: Normal right ventricular cavity size. Systolic function is normal. Pacemaker lead present in the ventricle.    Aortic Valve: There is aortic valve sclerosis. Mildly restricted motion. There is mild aortic regurgitation.    Mitral Valve: There is mild regurgitation.    Pulmonary Artery: The estimated pulmonary artery systolic pressure is 19 mmHg.    IVC/SVC: Normal venous pressure at 3 mmHg.    Current Heart Failure Medications  metoprolol succinate (TOPROL-XL) 24 hr tablet 100 mg, Daily, Oral  sacubitriL-valsartan  mg per tablet 1 tablet, 2 times daily, Oral  spironolactone tablet 25 mg, Daily, Oral  , Daily, Oral  , Daily, Oral    Plan  - Monitor strict I&Os and daily weights.    - Place on telemetry  - Low sodium diet  - Place on fluid restriction of 2 L.   - Cardiology has been consulted  - The patient's volume status is at their baseline    Cards recs:  diuresed well with IV Lasix. No significant residual swelling of the feet. Recommend transitioning to oral Lasix  -minimally elevated troponin at 0.06.   Continue dual antiplatelet therapy.  -agree with medical management of ischemic cardiomyopathy with Entresto, Aldactone, Toprol-XL   Follow up in  Cardiology Clinic for management of moderate aortic stenosis.  Likely we will need dobutamine stress echocardiogram on outpatient basis.    BB decreased due to bradycardia in 40s/ 50s    Acidosis, metabolic  Mild       AICD (automatic cardioverter/defibrillator) present  Cards recs:  patient has a Medtronic single-chamber AICD.  Recommend device interrogation for completeness.    S/P right coronary artery (RCA) stent placement  Chonic, stable    Stroke  Hx of  Continue chronic home meds  Hyponatremia (Resolved: 5/19/2025)  Hyponatremia is likely due to Dehydration/hypovolemia. The patient's most recent sodium results are listed below.  Recent Labs     05/17/25  0131 05/17/25  0454   * 137     Plan  - Correct the sodium by 4-6mEq in 24 hours.   - Obtain the following studies: none.  - Will treat the hyponatremia with IV fluids as follows: 1L in ed  - Monitor sodium Daily.   - Patient hyponatremia is stable  - mild  Fever  Negative UA/ CXR upon admission  Check viral panel/ blood cx's    Acute encephalopathy  Likely metabolic      VTE Risk Mitigation (From admission, onward)           Ordered     enoxaparin injection 40 mg  Daily         05/17/25 0428     IP VTE HIGH RISK PATIENT  Once         05/17/25 0428     Place sequential compression device  Until discontinued         05/17/25 0428                    Discharge Planning   OBDULIA: 5/20/2025     Code Status: DNR   Medical Readiness for Discharge Date:   Discharge Plan A: Home          Staci Millard MD  Department of Hospital Medicine   [facility]

## 2025-05-19 NOTE — NURSING
"RAPID RESPONSE NURSE PROACTIVE ROUNDING NOTE     Time of Visit: 1115    Admit Date: 2025  LOS: 1  Code Status: DNR   Date of Visit: 2025  : 1945  Age: 79 y.o.  Sex: male  Race: White  Bed: K526/K526 A:   MRN: [id number]  Was the patient discharged from an ICU this admission? No   Was the patient discharged from a PACU within last 24 hours?  No  Did the patient receive conscious sedation/general anesthesia in last 24 hours?  No  Was the patient in the ED within the past 24 hours?  No  Was the patient started on NIPPV within the past 24 hours?  No  Attending Physician: Staci Millard MD  Primary Service: Networked reference to record PCT     ASSESSMENT     Notified by Charge RN during rounding.  Reason for alert: Alerted mental status     Diagnosis: Altered mental state    Abnormal Vital Signs: /79   Pulse (!) 56   Temp 98.8 °F (37.1 °C) (Oral)   Resp 18   Ht 6' 1" (1.854 m)   Wt 117 kg (257 lb 15 oz)   SpO2 95%   BMI 34.03 kg/m²      Clinical Issues: Neuro & Respiratory  Patient  has a past medical history of Cardiomyopathy, CHF (congestive heart failure), Coronary artery disease, Diverticulitis large intestine, GERD (gastroesophageal reflux disease), Hypertension, and Obesity.      Proactive round requested by charge ROSY Sheth and bedside RN Fawn. Patient admitted with altered mental status, noted to be lethargic. Arousable to voice and answering questions appropriately. Noted to have diffusely coarse breath sounds, lower > upper fields. Bedside RN reporting productive cough. Intermittently febrile overnight. SpO2 stable on 2L NC.        INTERVENTIONS/ RECOMMENDATIONS     - Neurology consult  - Continue to monitor respiratory status  - Follow up COVID/Flu/Blood culture results   - ABG  - Please notify primary team and rapid response RN with any acute changes in patient's condition.     Discussed plan of care with Fawn GALLEGOS.    PHYSICIAN ESCALATION     Yes/No  Yes    Orders " received and case discussed with Dr. Millard.    Disposition: Remain in room 526.    FOLLOW-UP     Call back the Rapid Response Nurse, Myrna RN at [phone] for additional questions or concerns.

## 2025-05-19 NOTE — PLAN OF CARE
Pt would benefit from cont OT services in order to maximize functional independence. Recommending high intensity therapy upon d/c. At baseline pt is independent and very active, works in his garden at home, drives. Pt reports not feeling back to his baseline at this time. Pt requires Ya with increased time & use of RW for functional mobility in room. There is an expectation of returning to prior level of function to maintain independence thus avoiding readmission.  Patient's clinical condition meets full Inpatient Rehab (IPR) criteria; including the ability to actively participate in 3 hours of therapy.  A lower level of care(SNF) cannot provide the interdisciplinary treatment approach needed.     Problem: Occupational Therapy  Goal: Occupational Therapy Goal  Description: Goals to be met by: 06/19/2025     Patient will increase functional independence with ADLs by performing:    UE Dressing with Modified [place].  LE Dressing with Modified [place].  Grooming while standing with Modified [place].  Toileting from toilet with Modified [place] for hygiene and clothing management.   Step transfer with Modified [place]  Toilet transfer to toilet with Modified [place].    Outcome: Progressing

## 2025-05-19 NOTE — ASSESSMENT & PLAN NOTE
Patient has Combined Systolic and Diastolic heart failure that is Chronic. On presentation their CHF was well compensated. Most recent BNP and echo results are listed below.  Recent Labs     05/17/25  0014   *     Latest ECHO  Results for orders placed during the hospital encounter of 01/05/25    Echo    Interpretation Summary    Left Ventricle: The left ventricle is normal in size. There is concentric hypertrophy. Regional wall motion abnormalities present. See diagram for wall motion findings. There is reduced systolic function. Quantitated ejection fraction is 36%. Unable to assess diastolic function due to poor image quality.    Right Ventricle: Normal right ventricular cavity size. Systolic function is normal. Pacemaker lead present in the ventricle.    Aortic Valve: There is aortic valve sclerosis. Mildly restricted motion. There is mild aortic regurgitation.    Mitral Valve: There is mild regurgitation.    Pulmonary Artery: The estimated pulmonary artery systolic pressure is 19 mmHg.    IVC/SVC: Normal venous pressure at 3 mmHg.    Current Heart Failure Medications  metoprolol succinate (TOPROL-XL) 24 hr tablet 100 mg, Daily, Oral  sacubitriL-valsartan  mg per tablet 1 tablet, 2 times daily, Oral  spironolactone tablet 25 mg, Daily, Oral  , Daily, Oral  , Daily, Oral    Plan  - Monitor strict I&Os and daily weights.    - Place on telemetry  - Low sodium diet  - Place on fluid restriction of 2 L.   - Cardiology has been consulted  - The patient's volume status is at their baseline    Cards recs:  diuresed well with IV Lasix. No significant residual swelling of the feet. Recommend transitioning to oral Lasix  -minimally elevated troponin at 0.06.   Continue dual antiplatelet therapy.  -agree with medical management of ischemic cardiomyopathy with Entresto, Aldactone, Toprol-XL   Follow up in Cardiology Clinic for management of moderate aortic stenosis.  Likely we will need dobutamine stress  echocardiogram on outpatient basis.    BB decreased due to bradycardia in 40s/ 50s

## 2025-05-19 NOTE — ASSESSMENT & PLAN NOTE
- echo with EF 35-40% and grade II diastolic dysfunction  - admitted with syncope   - ; AICD interrogation with no events noted   - on Aldactone, Entresto 97/103 and Toprol 50 and continued while admitted; SBP 100s-130s; son reports transition of oral lasix to prn use as an outpatient   - will continue current regimen

## 2025-05-19 NOTE — CONSULTS
"NEUROLOGY CONSULT EVALUATION    Reason for consult:  Abnormal EEG    CC:  "I don't know"    HPI:   Nimesh Saini is a 79 y.o. 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     Pt reports that he was driving to a  and stopped at a gas station to rest. He did not drink much water that day. He remembers getting out of his car and walking around a little bit. The last thing he remembers was that he felt hot. Next, he was on the ground unconscious for ~3 hrs at the gas station. When he woke up he was confused and felt like he had lost time. Reports that this felt different from sleeping. He could not see or hear anything happening around him during this event. Did not bite his tongue and did not have bladder/bowel incontinence.    Pt denies ever having a seizure. Has never had shaking movements of the extremities. He had a similar prolonged loss of consciousness 1-2 months ago when he was working outside on a hot day and became dehydrated. He was given fluids at an OSH and improved.    Pt has history of pituitary macroadenoma. He denies any headaches. Reports that he had discussed watch and wait approach with prior physician, no operative plans.    During this hospitalization, EEG was obtained which showed "rare left temporal focal mixed delta range slowing"    ROS: Pertinent items are noted in HPI.    Histories:     Allergies:  Atorvastatin    Current Medications:  Current Medications[1]    Past Medical/Surgical/Family History:  PMHx:   Past Medical History:   Diagnosis Date    Cardiomyopathy     ICM EF 15%    CHF (congestive heart failure)     chronic " combined     Coronary artery disease     3 vessel    Diverticulitis large intestine 08/2014    GERD (gastroesophageal reflux disease)     Hypertension     Obesity       Surgeries:   Past Surgical History:   Procedure Laterality Date    CARDIAC DEFIBRILLATOR PLACEMENT Left 09/2017    CORONARY ANGIOPLASTY WITH STENT PLACEMENT  06/2017    coronary angiogram; stents x 5    UMBILICAL HERNIA REPAIR      1990's      Family  Hx:   Family History   Problem Relation Name Age of Onset    Diabetes Mother      Hypertension Father      Stroke Father      Asthma Neg Hx      Emphysema Neg Hx       Social History:  Tobacco: former minor smoker (2.5 pyh)  EtOH: none  Illicit drugs: denies      Current Evaluation:     Vital Signs:   Vitals:    05/19/25 0746   BP:    Pulse: (!) 56   Resp:    Temp:         Neurological Examination   Gen: Alert and oriented to name, city, year, president  Language: No dysarthria or aphasia    CN  CN II - Visual fields intact, Pupils slightly asymmetric (R 3 mm, L 4 mm), react equally to light  CN III, IV, VI - EOM intact without nystagmus  CN V1, V2, V3 - Facial sensation preserved bilaterally  CN VII - Patient is able to smile symmetrically. R eyelid ptosis, mild  CN VIII - No hearing deficit  CN IX and X - Palate rises symmetrically, uvula is midline  CN XI - Motor strength of shoulder shrug is 5/5 bilaterally  CN XII - Tongue protrudes midline without fasciculation    Motor     Left Right   Shoulder abduction 5/5 5/5   Shoulder adduction 5/5 5/5   Elbow flexion 5/5 5/5   Elbow extension 5/5 5/5   Wrist flexion 5/5 5/5   Wrist extension 5/5 5/5    strength 5/5 5/5   Hip flexion 4/5 4/5   Knee flexion 5/5 5/5   Knee extension 5/5 5/5   Ankle plantarflexion 5/5 5/5   Ankle dorsiflexion 5/5 5/5     Normal muscle tone, no significant limitations in range of motion    Sensory: Intact to light touch in BUE and BLE, no neglect    Reflexes           Left       Right   Brachioradialis        2+         2+  "  Bicep        2+         2+   Tricep        2+         2+   Patellar        2+         2+   Achilles        0+         0+   Babinski   Negative    Negative     Cerebellar: No dysmetria on finger to nose bilaterally    Gait: Deferred    LABORATORY STUDIES:  CBC:   Recent Labs   Lab 05/17/25  0014   WBC 7.73   HGB 16.4   HCT 47.2   PLT 98*   MCV 90   RDW 13.0     BMP:   Recent Labs   Lab 05/17/25  0131 05/17/25  0454   * 137   K 4.9 5.1    105   CO2 17* 19*   BUN 24* 24*   CREATININE 1.4 1.2   GLU 89 93   CALCIUM 8.5* 8.7     LFTs:   Recent Labs   Lab 05/17/25 0131   PROT 7.1   ALBUMIN 3.6   BILITOT 0.8   AST 67*   ALKPHOS 58   ALT 40     Coags:   Recent Labs   Lab 05/17/25  0014   INR 1.0   PROTIME 11.9     FLP: No results for input(s): "CHOL", "LDLCALC", "TRIG", "CHOLHDL" in the last 168 hours.  DM:   Recent Labs   Lab 05/17/25  0131 05/17/25 0454   CREATININE 1.4 1.2     Thyroid:   Recent Labs   Lab 05/17/25  0014   TSH 1.539     Anemia: No results for input(s): "IRON", "TIBC", "FERRITIN", "[id number]", "FOLATE" in the last 168 hours.  Cardiac:   Recent Labs   Lab 05/17/25  0014 05/17/25  0454   TROPONINI 0.043* 0.066*   *  --      Urinalysis:   Recent Labs   Lab 05/17/25  0326   COLORU Yellow   APPEARANCEUA Clear   PHUR 5.0   SPECGRAV 1.025   PROTEINUA Trace*   GLUCUA 4+*   BILIRUBINUA Negative   OCCULTUA Negative   NITRITE Negative   UROBILINOGEN Negative   LEUKOCYTESUR Negative     Urine Micro:  Recent Labs   Lab 05/17/25  0326   RBCUA 1   WBCUA 2   BACTERIA None         RADIOLOGY STUDIES:  I have personally reviewed the images performed.     MRI brain w and wo 3/22/23  Findings:   There is fullness of the sella secondary to a hypoechoic enhancing pituitary macroadenoma. The macroadenoma measures 1.2 x 1.7 x 1.3 cm. There is less than 180 degree contact of the right cavernous internal carotid artery. Minimal contact of the left   cavernous internal carotid artery. There are normal vascular " flow voids present. The infundibulum and pituitary are displaced leftward. There is resulting displacement of the abdomen demonstrate no cranially which results in mild mass effect upon the   left aspect of the chiasm and proximal most left optic nerve.     Repeat of the diffusion-weighted images of the whole brain demonstrate a questionable area of diffusion restriction the right lateral temporal lobe vs. artifact. There is no ADC hypointensity.     IMPRESSION:   1. Pituitary macroadenoma with right cavernous sinus invasion and slightly less than 180 degrees contact of the right cavernous ICA.   2. Contact of the left aspect of the chiasm and proximal left optic nerve with slight effacement. Recommend correlation with visual field testing.   3. Tiny focus of mild diffusion restriction in the far lateral right temporal lobe may be artifactual without ADC correlate. Tiny evolving infarct is difficult to exclude.     CTH non con 5/16/25  FINDINGS:  Ventricles and sulci are normal in size for age without evidence of hydrocephalus. No extra-axial blood or fluid collections.  There are remote lacunar infarcts in the bilateral cerebellar hemispheres and the right basal ganglia and corona radiata.  There is a remote lacunar infarct in the left delgado.  There are chronic microvascular ischemic changes, unchanged prior.  There are several punctate calcifications, unchanged, likely related to remote trauma or infection.  No parenchymal mass, hemorrhage, edema or major vascular distribution infarct.     No calvarial fracture.  There is a density in the left frontal scalp soft tissues.  Bilateral paranasal sinuses and mastoid air cells are clear.     Impression:     No acute intracranial abnormality.     Chronic microvascular ischemic changes and remote infarcts as above.    EEG 5/17/25  EEG FINDINGS:  The recording was obtained with a number of standard bipolar and referential montages during wakefulness, drowsiness and sleep.   In the alert state, the posterior background rhythm was a symmetric, well-modulated 9 Hz activity, which reacted symmetrically to eye opening.  Rare left temporal focal mixed delta range slowing was noted During drowsiness, the background rhythm waxed and waned and there were periods of slowing.  During stage II sleep, symmetric V waves and sleep spindles were noted. There were no interictal epileptiform abnormalities and no clinical or electrographic seizures were recorded.     The EKG channel revealed a sinus rhythm.     IMPRESSION:  This is an abnormal EEG during wakefulness, drowsiness and sleep. Rare left temporal focal mixed delta range slowing was noted    Assessment:  TYRONE Saini is a 79 y.o. 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    Impression:  3 hr LOC event unlikely to be seizure or syncope based on history  Period of obtundation more likely due to prolonged vertebrobasilar insufficiency. Pt with known critical flow-limiting mid basilar artery stenosis of 85%    Recommendations  - Would not start anti-seizure medication at this time as he has had no definite seizure events  - PT/OT  - Continue DAPT (ASA 81 mg and Plavix 75 mg daily) indefinitely for critical flow limiting stenosis of the basilar artery  - Atorvastatin 40 mg nightly, goal LDL < 70  - Imaging: Can be done inpatient if he is expected to stay longer for other medical issues, otherwise can be done outpatient if these studies will delay discharge   - MRA head and neck wo   - MRI brain w and wo   - MRI pituitary/sella w and wo  - Tilt table EEG outpatient  - FU with [facility] outpatient      Case seen and discussed with Dr. Stuart Minaya MD  [facility]         [1]   Current Facility-Administered Medications   Medication Dose Route Frequency Provider Last Rate Last Admin    acetaminophen tablet 650 mg  650 mg Oral Q4H PRN Lazaro Price MD   650 mg at 05/18/25 1553    aluminum-magnesium hydroxide-simethicone 200-200-20 mg/5 mL suspension 30 mL  30 mL Oral QID PRN Lazaro Price MD        aspirin EC tablet 81 mg  81 mg Oral Every other day Lazaro Price MD   81 mg at 05/19/25 0842    clopidogreL tablet 75 mg  75 mg Oral Daily Lazaro Price MD   75 mg at 05/19/25 0842    cyanocobalamin tablet 1,000 mcg  1,000 mcg Oral Daily Lazaro Price MD   1,000 mcg at 05/19/25 0842    dextrose 50% injection 12.5 g  12.5 g Intravenous PRN Lazaro Price MD        dextrose 50% injection 25 g  25 g Intravenous PRN Lazaro Price MD        enoxaparin injection 40 mg  40 mg Subcutaneous Daily Lazaro Price MD   40 mg at 05/18/25 1601    glucagon (human recombinant) injection 1 mg  1 mg Intramuscular PRN Lazaro Price MD        glucose chewable tablet 16 g  16 g Oral PRN Lazaro Price MD        glucose chewable tablet 24 g  24 g Oral PRN Lazaro Price MD        guaiFENesin 100 mg/5 ml syrup 200 mg  200 mg Oral Q4H PRN Lorna Ortega MD   200 mg at 05/19/25 1044    magnesium oxide tablet 800 mg  800 mg Oral PRN Lazaro Price MD        magnesium oxide tablet 800 mg  800 mg Oral PRN Lazaro Price MD        melatonin tablet 6 mg  6 mg Oral Nightly PRN Lazaro Price MD   6 mg at 05/18/25 2042    metoprolol succinate (TOPROL-XL) 24 hr tablet 100 mg  100 mg Oral Daily Lazaro Price MD        naloxone 0.4 mg/mL injection 0.02 mg  0.02 mg Intravenous PRN Lazaro Price MD        ondansetron injection 4 mg  4 mg Intravenous Q8H PRN Lazaro Price MD        pantoprazole EC tablet 40 mg  40 mg Oral Daily Lazaro Price MD   40 mg at 05/19/25 0842    potassium bicarbonate disintegrating tablet 35 mEq  35 mEq Oral PRN  Lazaro Price MD        potassium bicarbonate disintegrating tablet 50 mEq  50 mEq Oral PRN Lazaro Price MD        potassium bicarbonate disintegrating tablet 60 mEq  60 mEq Oral PRN Lazaro Price MD        potassium, sodium phosphates 280-160-250 mg packet 2 packet  2 packet Oral PRN Lazaro Price MD        potassium, sodium phosphates 280-160-250 mg packet 2 packet  2 packet Oral PRN Lazaro Price MD        potassium, sodium phosphates 280-160-250 mg packet 2 packet  2 packet Oral PRN Lazaro Price MD        sacubitriL-valsartan  mg per tablet 1 tablet  1 tablet Oral BID Lazaro Price MD   1 tablet at 05/19/25 0842    simethicone chewable tablet 80 mg  1 tablet Oral QID PRN Lazaro Price MD        sodium chloride 0.9% flush 10 mL  10 mL Intravenous Q12H PRN Lazaro Price MD        spironolactone tablet 25 mg  25 mg Oral Daily Lazaro Price MD   25 mg at 05/19/25 0842

## 2025-05-19 NOTE — PLAN OF CARE
Son and daughter at bedside at beginning of shift. Plan of care reviewed with pt and family. Fall risk monitor in place. Pt's cough remains congested but is able to expectorate milky phlegm. Slight elevation in temp ,not high enough to meet order parameters for tylenol administration. No pain reported by pt throughout shift. Medications administered per MAR. Safety maintained.     Problem: Adult Inpatient Plan of Care  Goal: Optimal Comfort and Wellbeing  Outcome: Progressing     Problem: Fall Injury Risk  Goal: Absence of Fall and Fall-Related Injury  Outcome: Progressing     Problem: Comorbidity Management  Goal: Maintenance of Heart Failure Symptom Control  Outcome: Progressing     Problem: Skin Injury Risk Increased  Goal: Skin Health and Integrity  Outcome: Progressing

## 2025-05-19 NOTE — HOSPITAL COURSE
Per consult note by Dr Nelson 5/18/2025 79-year-old male, DNR code status, history of ischemic cardiomyopathy with EF of around 15-20% in the past, CAD with  of the LAD, distal left circumflex and RCA disease in 2018, history of PCI of the RCA, AICD in place, admitted to the hospital with altered mental status/possible syncope.  No AICD shocks.  No significant ST segment changes on EKG.  Denies chest pain.  Cardiology consulted for further recommendations.    -diuresed well with IV Lasix.  No significant residual swelling of the feet.  Recommend transitioning to oral Lasix.  -patient has a [employer] single-chamber AICD.  Recommend device interrogation for completeness.  Denies any AICD shocks in the recent past.  -minimally elevated troponin at 0.06.   Continue dual antiplatelet therapy.  -agree with medical management of ischemic cardiomyopathy with Entresto, Aldactone, Toprol-XL.  - no further recommendations from Cardiology standpoint.  Recommend device interrogation prior to discharge..  Follow up in Cardiology Clinic for management of moderate aortic stenosis.  Likely we will need dobutamine stress echocardiogram on outpatient basis.     5/19/2025 last CBC and BMP reviewed from 5/17. HR and BP stable. No arrhythmias noted on telemetry Tmax 100.1 overnight.

## 2025-05-19 NOTE — SUBJECTIVE & OBJECTIVE
Review of Systems   Unable to perform ROS: Other     Objective:     Vital Signs (Most Recent):  Temp: 98.8 °F (37.1 °C) (05/19/25 1218)  Pulse: (!) 56 (05/19/25 1305)  Resp: 18 (05/19/25 1218)  BP: 122/79 (05/19/25 1218)  SpO2: 95 % (05/19/25 1218) Vital Signs (24h Range):  Temp:  [98 °F (36.7 °C)-101 °F (38.3 °C)] 98.8 °F (37.1 °C)  Pulse:  [55-63] 56  Resp:  [17-22] 18  SpO2:  [91 %-96 %] 95 %  BP: (104-142)/(65-79) 122/79     Weight: 117 kg (257 lb 15 oz)  Body mass index is 34.03 kg/m².     SpO2: 95 %         Intake/Output Summary (Last 24 hours) at 5/19/2025 1403  Last data filed at 5/19/2025 0401  Gross per 24 hour   Intake --   Output 375 ml   Net -375 ml       Lines/Drains/Airways       Peripheral Intravenous Line  Duration                  Peripheral IV - Single Lumen 05/16/25 20 G Yes Anterior;Proximal;Right Forearm 3 days         Peripheral IV - Single Lumen 05/17/25 0510 20 G No Left Wrist 2 days                       Physical Exam  Constitutional:       General: He is not in acute distress.     Appearance: He is well-developed.   Cardiovascular:      Rate and Rhythm: Normal rate and regular rhythm.      Heart sounds: No murmur heard.     No gallop.   Pulmonary:      Effort: Pulmonary effort is normal. No respiratory distress.      Breath sounds: Normal breath sounds. No wheezing.   Abdominal:      General: Bowel sounds are normal. There is no distension.      Palpations: Abdomen is soft.      Tenderness: There is no abdominal tenderness.   Skin:     General: Skin is warm and dry.                Significant Imaging: Echocardiogram: Transthoracic echo (TTE) complete (Cupid Only):   Results for orders placed or performed during the hospital encounter of 05/16/25   Echo Saline Bubble? No; Ultrasound enhancing contrast? Yes   Result Value Ref Range    BSA 2.46 m2    Jose's Biplane MOD Ejection Fraction 42 %    A2C EF 43 %    A4C EF 42 %    LVOT stroke volume 69.2 cm3    LVIDd 6.7 (A) 3.5 - 6.0 cm     "LV Systolic Volume 146 mL    LV Systolic Volume Index 60.6 mL/m2    LVIDs 5.5 (A) 2.1 - 4.0 cm    LV ESV A2C 64.37 mL    LV Diastolic Volume 230 mL    LV ESV A4C 42.55 mL    LV Diastolic Volume Index 95.44 mL/m2    LV EDV A2C 268.583911227307981 mL    LV EDV A4C 242.14 mL    Left Ventricular End Systolic Volume by Teichholz Method 145.87 mL    Left Ventricular End Diastolic Volume by Teichholz Method 229.81 mL    IVS 1.1 0.6 - 1.1 cm    LVOT diameter 2.4 cm    LVOT area 4.5 cm2    FS 17.9 (A) 28 - 44 %    Left Ventricle Relative Wall Thickness 0.21 cm    PW 0.7 0.6 - 1.1 cm    LV mass 261.3 g    LV Mass Index 108.4 g/m2    MV Peak E Jonel 0.58 m/s    TDI LATERAL 0.04 m/s    TDI SEPTAL 0.04 m/s    E/E' ratio 15 m/s    MV Peak A Jonel 0.83 m/s    TR Max Jonel 3.1 m/s    E/A ratio 0.70     IVRT 114 msec    E wave deceleration time 225 msec    MV "A" wave duration 117.483866997683707 msec    LV SEPTAL E/E' RATIO 14.5 m/s    LV LATERAL E/E' RATIO 14.5 m/s    LVOT peak jonel 0.8 m/s    Left Ventricular Outflow Tract Mean Velocity 0.60 cm/s    Left Ventricular Outflow Tract Mean Gradient 1.56 mmHg    RV- ramon basal diam 4.8 cm    RV S' 15.93 cm/s    TAPSE 2.9 cm    RV/LV Ratio 0.72 cm    LA Vol (MOD) 54 mL    MARGO (MOD) 22 mL/m2    RA area length vol 56.91 mL    RA Area 21.2 cm2    RA Vol 62.12 mL    AV regurgitation pressure 1/2 time 744 ms    AR Max Jonel 3.79 m/s    AV mean gradient 14 mmHg    AV peak gradient 23 mmHg    Ao peak jonel 2.4 m/s    Ao VTI 49.3 cm    LVOT peak VTI 15.3 cm    AV valve area 1.4 cm²    AV Velocity Ratio 0.33     AV index (prosthetic) 0.31     NILESH by Velocity Ratio 1.5 cm²    Mr max jonel 3.35 m/s    MV mean gradient 1 mmHg    MV peak gradient 4 mmHg    MV stenosis pressure 1/2 time 65.22 ms    MV valve area p 1/2 method 3.37 cm2    MV valve area by continuity eq 1.86 cm2    MV VTI 37.1 cm    Triscuspid Valve Regurgitation Peak Gradient 38 mmHg    PV PEAK VELOCITY 1.11 m/s    PV peak gradient 5 mmHg    Sinus " 3.35 cm    ASI 1.4 cm/m2    STJ 3.1 cm    Ascending aorta 2.8 cm    ASI 1.2 cm/m2    IVC diameter 1.64 cm    Mean e' 0.04 m/s    ZLVIDS -1.55     ZLVIDD -5.12     LA area A4C 16.53 cm2    LA area A2C 20.73 cm2    TV resting pulmonary artery pressure 41 mmHg    RV TB RVSP 6 mmHg    Est. RA pres 3 mmHg    Narrative      Left Ventricle: The left ventricle is moderately dilated. Mildly   increased wall thickness. Moderate global hypokinesis and regional wall   motion abnormalities present. See diagram for wall motion findings. There   is moderately reduced systolic function with a visually estimated ejection   fraction of 35 - 40%. Quantitated ejection fraction is 42%. Grade II   diastolic dysfunction.    Right Ventricle: The right ventricle is moderately dilated Systolic   function is normal. Pacemaker lead present in the ventricle.    Right Atrium: The right atrium is mildly dilated .    Aortic Valve: The aortic valve is a trileaflet valve. Severely   calcified cusps. There is moderate stenosis. Aortic valve area by VTI is   1.4 cm². Aortic valve peak velocity is 2.4 m/s. Mean gradient is 14 mmHg.   The dimensionless index is 0.31. There is mild aortic regurgitation.    Tricuspid Valve: There is mild regurgitation.    Pulmonary Artery: The estimated pulmonary artery systolic pressure is   41 mmHg.    IVC/SVC: Normal venous pressure at 3 mmHg.

## 2025-05-19 NOTE — SUBJECTIVE & OBJECTIVE
Interval History: NAEON. Patient says he has been fine. No acute concerns. Says he is in the hospital because he passed out, but does not recall what happened.     Review of Systems   Constitutional:  Negative for fever (but has been intermittently febrile since yesterday).   Respiratory:  Negative for shortness of breath.    Cardiovascular:  Negative for chest pain.   Gastrointestinal:  Negative for abdominal pain.   Genitourinary:  Negative for dysuria.     Objective:     Vital Signs (Most Recent):  Temp: 98.8 °F (37.1 °C) (05/19/25 1218)  Pulse: (!) 55 (05/19/25 1218)  Resp: 18 (05/19/25 1218)  BP: 122/79 (05/19/25 1218)  SpO2: 95 % (05/19/25 1218) Vital Signs (24h Range):  Temp:  [98 °F (36.7 °C)-101 °F (38.3 °C)] 98.8 °F (37.1 °C)  Pulse:  [55-63] 55  Resp:  [17-22] 18  SpO2:  [91 %-96 %] 95 %  BP: (104-142)/(65-79) 122/79     Weight: 117 kg (257 lb 15 oz)  Body mass index is 34.03 kg/m².    Intake/Output Summary (Last 24 hours) at 5/19/2025 1222  Last data filed at 5/19/2025 0401  Gross per 24 hour   Intake --   Output 575 ml   Net -575 ml         Physical Exam  Vitals and nursing note reviewed.   Constitutional:       General: He is not in acute distress.     Appearance: He is well-developed. He is obese.   HENT:      Head: Normocephalic and atraumatic.   Eyes:      Conjunctiva/sclera: Conjunctivae normal.   Neck:      Vascular: No JVD.   Cardiovascular:      Rate and Rhythm: Normal rate and regular rhythm.      Heart sounds: Normal heart sounds.   Pulmonary:      Effort: Pulmonary effort is normal.      Breath sounds: Normal breath sounds.      Comments: Coarse R>L  Abdominal:      General: Bowel sounds are normal. There is no distension.      Palpations: Abdomen is soft.      Tenderness: There is no abdominal tenderness.   Musculoskeletal:      Cervical back: Neck supple.      Right lower leg: No edema.      Left lower leg: No edema.   Neurological:      Mental Status: He is lethargic.   Psychiatric:         " Behavior: Behavior normal.               Significant Labs: All pertinent labs within the past 24 hours have been reviewed.  CBC: No results for input(s): "WBC", "HGB", "HCT", "PLT" in the last 48 hours.  CMP: No results for input(s): "NA", "K", "CL", "CO2", "GLU", "BUN", "CREATININE", "CALCIUM", "PROT", "ALBUMIN", "BILITOT", "ALKPHOS", "AST", "ALT", "ANIONGAP", "EGFRNONAA" in the last 48 hours.    Invalid input(s): "ESTGFA[place]"    Significant Imaging: I have reviewed all pertinent imaging results/findings within the past 24 hours.  "

## 2025-05-19 NOTE — ASSESSMENT & PLAN NOTE
- followed by Dr. Hawk Avendano  - history of CAD with  of the LAD, distal left circumflex and RCA disease in 2018, history of PCI of the RCA  - continue ASA,Plavix and BB- CAD medically managed and appears stable at this time

## 2025-05-19 NOTE — ASSESSMENT & PLAN NOTE
Cards recs:  patient has a [employer] single-chamber AICD.  Recommend device interrogation for completeness.

## 2025-05-20 PROBLEM — I25.10 CAD, MULTIPLE VESSEL: Status: RESOLVED | Noted: 2017-05-02 | Resolved: 2025-05-20

## 2025-05-20 LAB
ABSOLUTE EOSINOPHIL (OHS): 0.02 K/UL
ABSOLUTE MONOCYTE (OHS): 0.35 K/UL (ref 0.3–1)
ABSOLUTE NEUTROPHIL COUNT (OHS): 1.97 K/UL (ref 1.8–7.7)
ALBUMIN SERPL BCP-MCNC: 3.3 G/DL (ref 3.5–5.2)
ALP SERPL-CCNC: 52 UNIT/L (ref 40–150)
ALT SERPL W/O P-5'-P-CCNC: 37 UNIT/L (ref 10–44)
ANION GAP (OHS): 9 MMOL/L (ref 8–16)
AST SERPL-CCNC: 42 UNIT/L (ref 11–45)
BASOPHILS # BLD AUTO: 0.01 K/UL
BASOPHILS NFR BLD AUTO: 0.3 %
BILIRUB SERPL-MCNC: 0.8 MG/DL (ref 0.1–1)
BUN SERPL-MCNC: 19 MG/DL (ref 8–23)
CALCIUM SERPL-MCNC: 8.8 MG/DL (ref 8.7–10.5)
CHLORIDE SERPL-SCNC: 106 MMOL/L (ref 95–110)
CO2 SERPL-SCNC: 20 MMOL/L (ref 23–29)
CREAT SERPL-MCNC: 1.2 MG/DL (ref 0.5–1.4)
ERYTHROCYTE [DISTWIDTH] IN BLOOD BY AUTOMATED COUNT: 12.9 % (ref 11.5–14.5)
FLUAV AG UPPER RESP QL IA.RAPID: NEGATIVE
FLUBV AG UPPER RESP QL IA.RAPID: NEGATIVE
GFR SERPLBLD CREATININE-BSD FMLA CKD-EPI: >60 ML/MIN/1.73/M2
GLUCOSE SERPL-MCNC: 87 MG/DL (ref 70–110)
HCT VFR BLD AUTO: 40.7 % (ref 40–54)
HGB BLD-MCNC: 14.2 GM/DL (ref 14–18)
IMM GRANULOCYTES # BLD AUTO: 0.01 K/UL (ref 0–0.04)
IMM GRANULOCYTES NFR BLD AUTO: 0.3 % (ref 0–0.5)
LYMPHOCYTES # BLD AUTO: 0.7 K/UL (ref 1–4.8)
MCH RBC QN AUTO: 31.1 PG (ref 27–31)
MCHC RBC AUTO-ENTMCNC: 34.9 G/DL (ref 32–36)
MCV RBC AUTO: 89 FL (ref 82–98)
NUCLEATED RBC (/100WBC) (OHS): 0 /100 WBC
PLATELET # BLD AUTO: 85 K/UL (ref 150–450)
PLATELET BLD QL SMEAR: ABNORMAL
PMV BLD AUTO: 12.1 FL (ref 9.2–12.9)
POTASSIUM SERPL-SCNC: 3.6 MMOL/L (ref 3.5–5.1)
PROT SERPL-MCNC: 6.3 GM/DL (ref 6–8.4)
RBC # BLD AUTO: 4.56 M/UL (ref 4.6–6.2)
RELATIVE EOSINOPHIL (OHS): 0.7 %
RELATIVE LYMPHOCYTE (OHS): 22.9 % (ref 18–48)
RELATIVE MONOCYTE (OHS): 11.4 % (ref 4–15)
RELATIVE NEUTROPHIL (OHS): 64.4 % (ref 38–73)
RSV A 5' UTR RNA NPH QL NAA+PROBE: NEGATIVE
SARS-COV-2 RNA RESP QL NAA+PROBE: POSITIVE
SODIUM SERPL-SCNC: 135 MMOL/L (ref 136–145)
WBC # BLD AUTO: 3.06 K/UL (ref 3.9–12.7)

## 2025-05-20 PROCEDURE — 97110 THERAPEUTIC EXERCISES: CPT | Mod: CQ

## 2025-05-20 PROCEDURE — 21400001 HC TELEMETRY ROOM

## 2025-05-20 PROCEDURE — 97530 THERAPEUTIC ACTIVITIES: CPT | Mod: CO

## 2025-05-20 PROCEDURE — 80053 COMPREHEN METABOLIC PANEL: CPT | Performed by: NURSE PRACTITIONER

## 2025-05-20 PROCEDURE — 36415 COLL VENOUS BLD VENIPUNCTURE: CPT | Performed by: NURSE PRACTITIONER

## 2025-05-20 PROCEDURE — 25000003 PHARM REV CODE 250: Performed by: STUDENT IN AN ORGANIZED HEALTH CARE EDUCATION/TRAINING PROGRAM

## 2025-05-20 PROCEDURE — 63600175 PHARM REV CODE 636 W HCPCS: Performed by: INTERNAL MEDICINE

## 2025-05-20 PROCEDURE — 94761 N-INVAS EAR/PLS OXIMETRY MLT: CPT

## 2025-05-20 PROCEDURE — 25000003 PHARM REV CODE 250: Performed by: INTERNAL MEDICINE

## 2025-05-20 PROCEDURE — 85025 COMPLETE CBC W/AUTO DIFF WBC: CPT | Performed by: NURSE PRACTITIONER

## 2025-05-20 PROCEDURE — 99900035 HC TECH TIME PER 15 MIN (STAT)

## 2025-05-20 PROCEDURE — XW033E5 INTRODUCTION OF REMDESIVIR ANTI-INFECTIVE INTO PERIPHERAL VEIN, PERCUTANEOUS APPROACH, NEW TECHNOLOGY GROUP 5: ICD-10-PCS | Performed by: STUDENT IN AN ORGANIZED HEALTH CARE EDUCATION/TRAINING PROGRAM

## 2025-05-20 PROCEDURE — 27000207 HC ISOLATION

## 2025-05-20 PROCEDURE — 63600175 PHARM REV CODE 636 W HCPCS: Mod: JZ,TB | Performed by: STUDENT IN AN ORGANIZED HEALTH CARE EDUCATION/TRAINING PROGRAM

## 2025-05-20 PROCEDURE — 27000221 HC OXYGEN, UP TO 24 HOURS

## 2025-05-20 RX ORDER — FUROSEMIDE 40 MG/1
40 TABLET ORAL DAILY
Status: DISCONTINUED | OUTPATIENT
Start: 2025-05-21 | End: 2025-05-25 | Stop reason: HOSPADM

## 2025-05-20 RX ADMIN — CYANOCOBALAMIN TAB 1000 MCG 1000 MCG: 1000 TAB at 10:05

## 2025-05-20 RX ADMIN — SACUBITRIL AND VALSARTAN 1 TABLET: 97; 103 TABLET, FILM COATED ORAL at 09:05

## 2025-05-20 RX ADMIN — REMDESIVIR 200 MG: 100 INJECTION, POWDER, LYOPHILIZED, FOR SOLUTION INTRAVENOUS at 10:05

## 2025-05-20 RX ADMIN — SACUBITRIL AND VALSARTAN 1 TABLET: 97; 103 TABLET, FILM COATED ORAL at 10:05

## 2025-05-20 RX ADMIN — MELATONIN TAB 3 MG 6 MG: 3 TAB at 09:05

## 2025-05-20 RX ADMIN — CLOPIDOGREL BISULFATE 75 MG: 75 TABLET, FILM COATED ORAL at 10:05

## 2025-05-20 RX ADMIN — GUAIFENESIN 200 MG: 100 SOLUTION ORAL at 03:05

## 2025-05-20 RX ADMIN — GUAIFENESIN 200 MG: 100 SOLUTION ORAL at 09:05

## 2025-05-20 RX ADMIN — ENOXAPARIN SODIUM 40 MG: 40 INJECTION SUBCUTANEOUS at 05:05

## 2025-05-20 NOTE — PLAN OF CARE
05/20/25 1040   Rounds   Attendance Provider;Nurse    Discharge Plan A Home   Why the patient remains in the hospital Requires continued medical care       1040  Patient resting quietly in bed when ROQUE participated in SIBR with Dr Youssef & nurse Nolvia. No family present. Pt was admitted with AMS and (+) covid 5/19/2025 & is being followed by PT/OT. Pox 95% on 2L O2 via NC this AM. Pt does not use home O2. Remdesivir order noted.     MD informed the pt that he is not medically stable to discharge home today.     1515  PT/OT recs IRF following discharge.    CM to discuss discharge plan with the pt.      Will continue to follow.

## 2025-05-20 NOTE — NURSING
"Rapid Response Nurse Note     Chart review completed including VS, telemetry, notes, orders, and labs.  VS stable. No acute issues at this time.   /74   Pulse (!) 57   Temp 98.1 °F (36.7 °C)   Resp 18   Ht 6' 1" (1.854 m)   Wt 117 kg (257 lb 15 oz)   SpO2 95%   BMI 34.03 kg/m²      Please call Rapid Response RNMyrna RN with any questions or concerns at  [phone]     "

## 2025-05-20 NOTE — PLAN OF CARE
Pt visiting with balaji when care was assumed from ROSY Augustine. Pt very somnolent, but able to answer orientation questions appropriately. Breath sounds coarse with expiratory grunting. Continues to have congested, non-productive cough. Medications administered per MAR.Safety maintained.      Problem: Adult Inpatient Plan of Care  Goal: Optimal Comfort and Wellbeing  Outcome: Progressing  Goal: Readiness for Transition of Care  Outcome: Progressing     Problem: Fall Injury Risk  Goal: Absence of Fall and Fall-Related Injury  Outcome: Progressing     Problem: Comorbidity Management  Goal: Maintenance of Heart Failure Symptom Control  Outcome: Progressing  Goal: Blood Pressure in Desired Range  Outcome: Progressing     Problem: Fluid Volume Excess  Goal: Fluid Balance  Outcome: Progressing

## 2025-05-20 NOTE — PT/OT/SLP PROGRESS
Occupational Therapy   Treatment    Name: Nimesh Saini  MRN: [id number]  Admitting Diagnosis:  Altered mental state       Recommendations:     Discharge Recommendations: High Intensity Therapy  Discharge Equipment Recommendations:  to be determined by next level of care  Barriers to discharge:  Decreased caregiver support, Other (Comment) (Increased level of assistance)    Assessment:     Nimesh Saini is a 79 y.o. male with a medical diagnosis of Altered mental state.   Performance deficits affecting function are weakness, impaired endurance, impaired self care skills, impaired functional mobility, gait instability, impaired balance, decreased upper extremity function, decreased lower extremity function, impaired cardiopulmonary response to activity.    Pt found in chair, agreeable to therapy.  Pt on room air O2 sats 87-88%. Placed pt back 2L O2 increasing O2 sats to 92%. Nurse notified.  Pt progressing with therapy. Continue OT services to address functional goals, progressing as able.      Rehab Prognosis:  Good; patient would benefit from acute skilled OT services to address these deficits and reach maximum level of function.       Plan:     Patient to be seen 5 x/week to address the above listed problems via self-care/home management, therapeutic activities, therapeutic exercises  Plan of Care Expires: 06/19/25  Plan of Care Reviewed with: patient    Subjective     Chief Complaint: cough   Patient/Family Comments/goals: to return to Prior level of functioning   Pain/Comfort:  Pain Rating 1: 0/10  Pain Rating Post-Intervention 1: 0/10    Objective:     Communicated with: nurse prior to session.  Patient found up in chair with bed alarm, peripheral IV, telemetry upon OT entry to room.    General Precautions: Standard, fall    Orthopedic Precautions:N/A  Braces: N/A  Respiratory Status: Room air  Pt on room air O2 sats 87-88%. Placed pt back 2L O2 increasing O2 sats to 92%.     Occupational Performance:     Bed  "Mobility:    Patient completed Sit to Supine with moderate assistance for BLE assist     Functional Mobility/Transfers:  Patient completed Sit <> Stand Transfer with minimum assistance with rolling walker and vcs to scoot to edge of chair, BLE position and hand placement when sitting<>standing x 3 trials.   Patient completed Bed <> Chair Transfer using Stand Pivot technique with contact guard assistance and minimum assistance with rolling walker  Functional Mobility: Pt ambulated room distances including around bed to sink with CGA/Min A using RW. Pt requires assist for RW safety/mgmt/proximity.       [facility] 6 Click ADL: 17    Treatment & Education:  Assessing O2 sats on RA and with 2L O2. On room air, O2 sats 87-88%. Placed pt back 2L O2 increasing O2 sats to 92%. Pt 88-92 % during mobility.  He reports no SOB.   Pt declined G/H tasks at sink or sitting EOB stating "I'm good."    Patient left HOB elevated with all lines intact, call button in reach, bed alarm on, and nurse notified    GOALS:   Multidisciplinary Problems       Occupational Therapy Goals          Problem: Occupational Therapy    Goal Priority Disciplines Outcome Interventions   Occupational Therapy Goal     OT, PT/OT Progressing    Description: Goals to be met by: 06/19/2025     Patient will increase functional independence with ADLs by performing:    UE Dressing with Modified [place].  LE Dressing with Modified [place].  Grooming while standing with Modified [place].  Toileting from toilet with Modified [place] for hygiene and clothing management.   Step transfer with Modified [place]  Toilet transfer to toilet with Modified [place].                         Time Tracking:     OT Date of Treatment: 05/20/25  OT Start Time: 1410  OT Stop Time: 1440  OT Total Time (min): 30 min    Billable Minutes:Therapeutic Activity 30            5/20/2025  "

## 2025-05-20 NOTE — PLAN OF CARE
Problem: Adult Inpatient Plan of Care  Goal: Plan of Care Review  Outcome: Progressing  Goal: Patient-Specific Goal (Individualized)  Outcome: Progressing  Goal: Absence of Hospital-Acquired Illness or Injury  Outcome: Progressing  Goal: Optimal Comfort and Wellbeing  Outcome: Progressing  Goal: Readiness for Transition of Care  Outcome: Progressing     Problem: Fall Injury Risk  Goal: Absence of Fall and Fall-Related Injury  Outcome: Progressing     Problem: Comorbidity Management  Goal: Maintenance of Heart Failure Symptom Control  Outcome: Progressing  Goal: Blood Pressure in Desired Range  Outcome: Progressing     Problem: Electrolyte Imbalance  Goal: Electrolyte Balance  Outcome: Progressing     Problem: Fluid Volume Excess  Goal: Fluid Balance  Outcome: Progressing     Problem: Skin Injury Risk Increased  Goal: Skin Health and Integrity  Outcome: Progressing

## 2025-05-20 NOTE — PT/OT/SLP PROGRESS
"Physical Therapy Treatment    Patient Name:  Nimesh Saini   MRN:  [id number]    Recommendations:     Discharge Recommendations: High Intensity Therapy  Discharge Equipment Recommendations: to be determined by next level of care  Barriers to discharge: Decreased caregiver support and increased level of assistance at this time    Assessment:     Nimesh Saini is a 79 y.o. male admitted with a medical diagnosis of Altered mental state.  He presents with the following impairments/functional limitations: weakness, impaired endurance, impaired self care skills, impaired functional mobility, gait instability, impaired balance, impaired cardiopulmonary response to activity, decreased lower extremity function, decreased upper extremity function. Pt would continue to benefit from P.T. To address impairments listed above.        Rehab Prognosis: Fair; patient would benefit from acute skilled PT services to address these deficits and reach maximum level of function.    Recent Surgery: * No surgery found *      Plan:     During this hospitalization, patient to be seen 5 x/week to address the identified rehab impairments via gait training, therapeutic activities, therapeutic exercises, neuromuscular re-education and progress toward the following goals:    Plan of Care Expires:  06/19/25    Subjective       Patient/Family Comments/goals: Pt agreed to tx  Pain/Comfort:  Pain Rating 1: 0/10 ("coughing a lot")  Pain Rating Post-Intervention 1:  ("coughing a lot"  RN gave cough medicine)      Objective:     Communicated with RN prior to session.  Patient found HOB elevated with bed alarm, peripheral IV, telemetry upon PT entry to room.     General Precautions: Standard, fall  Orthopedic Precautions: N/A  Braces: N/A  Respiratory Status: Nasal cannula, flow 2 L/min           AM-PAC 6 CLICK MOBILITY  Turning over in bed (including adjusting bedclothes, sheets and blankets)?: 2  Sitting down on and standing up from a chair with arms " "(e.g., wheelchair, bedside commode, etc.): 3  Moving from lying on back to sitting on the side of the bed?: 2  Moving to and from a bed to a chair (including a wheelchair)?: 3  Need to walk in hospital room?: 3  Climbing 3-5 steps with a railing?: 2  Basic Mobility Total Score: 15       Treatment & Education:  Pt found supine with HOB elevated on wall unit O2 @ 2lpm.  Pt declined sitting EOB or any OOB activities at this time secondary to "coughing a lot."  RN entered room to give pt cough medicine.  Pt stated he just wanted to lie still and let medication work.  PTA provided education on the benefits of participating in tx with pt agreeing to BLE therex in bed for today. Pt reports having sat up in b/s chair earlier today.  Supine BLE therex: APs x 20 reps, heelslides, hip ABD/ADD, and SAQs 10 x 2 reps with Ya and vc's for proper technique.  Mild coughing during therex with brief rest between bouts as needed.    Patient left HOB elevated with all lines intact, call button in reach, bed alarm on, and RN notified..    GOALS:   Multidisciplinary Problems       Physical Therapy Goals          Problem: Physical Therapy    Goal Priority Disciplines Outcome Interventions   Physical Therapy Goal     PT, PT/OT Progressing    Description: Goals to be met by: 25     Patient will increase functional independence with mobility by performin. Supine to sit with Stand-by Assistance  2. Sit to supine with Stand-by Assistance  3. Sit to stand transfer with Stand-by Assistance  4. Bed to chair transfer with Stand-by Assistance using Rolling Walker  5. Gait  x 50 feet with Stand-by Assistance using Rolling Walker.   6. Ascend/descend 1 flight of stairs with 1-2 Handrails Contact Guard Assistance using No Assistive Device.                              Time Tracking:     PT Received On: 25  PT Start Time: 152     PT Stop Time: 1532  PT Total Time (min): 11 min     Billable Minutes: Therapeutic Exercise " 11    Treatment Type: Treatment  PT/PTA: PTA     Number of PTA visits since last PT visit: 1 05/20/2025

## 2025-05-20 NOTE — ASSESSMENT & PLAN NOTE
-CT head unremarkable  -interrogate pacemaker  EEG was performed to assess for subclinical seizures   IMPRESSION:  This is an abnormal EEG during wakefulness, drowsiness and sleep. Rare left temporal focal mixed delta range slowing was noted    Consult neurology > recommended outpatient MRI work up and EEG  No hypercapnia on ABG  Infectious w/u in progress > COVID positive

## 2025-05-20 NOTE — PROGRESS NOTES
[facility] Medicine  Progress Note    Patient Name: Nimesh Saini  MRN: [id number]  Patient Class: IP- Inpatient   Admission Date: 2025  Length of Stay: 2 days  Attending Physician: Goyo Youssef MD  Primary Care Provider: No primary care provider on file.        Subjective     Principal Problem:Altered mental state        HPI:  79-year-old male with extensive medical history including ischemic cardiomyopathy with an EF of less than 15% and multivessel CAD, presents brought in by EMS for altered mental status.  Patient drove to a gas station this evening, and after several hours of not leaving was noted to be lying on the ground next their car by gas station employees.  He had been on the way to a .     He states he was driving and trying to find a Religion and it was like he lost control of everything. He  last remembers looking for the Religion.  No bladder or bowel incontinence. No tongue biting.     Mild leg swelling that is new. He has been eating a lot of crawfish and crabs.       Overview/Hospital Course:  No notes on file    Interval History:     I have seen and examined the patient today  Patient is still coughing and sounds congested, requiring 2 L of nasal cannula.    He tested positive for COVID, we will start on remdesivir today    Review of Systems   Constitutional: Negative.    HENT: Negative.     Respiratory:  Positive for cough and shortness of breath.    Cardiovascular: Negative.    Gastrointestinal: Negative.    Genitourinary: Negative.    Musculoskeletal: Negative.    Skin: Negative.    Neurological: Negative.    Psychiatric/Behavioral: Negative.       Objective:     Vital Signs (Most Recent):  Temp: 97.6 °F (36.4 °C) (25 1130)  Pulse: (!) 56 (25 1130)  Resp: 20 (25 1130)  BP: 132/67 (25 1130)  SpO2: (!) 93 % (25 1517) Vital Signs (24h Range):  Temp:  [97.5 °F (36.4 °C)-98.8 °F (37.1 °C)] 97.6 °F (36.4 °C)  Pulse:  [50-64] 56  Resp:   "[17-20] 20  SpO2:  [92 %-95 %] 93 %  BP: (106-132)/(66-75) 132/67     Weight: 117 kg (257 lb 15 oz)  Body mass index is 34.03 kg/m².    Intake/Output Summary (Last 24 hours) at 5/20/2025 1548  Last data filed at 5/20/2025 1030  Gross per 24 hour   Intake --   Output 750 ml   Net -750 ml         Physical Exam  Constitutional:       Appearance: Normal appearance. He is obese.   HENT:      Head: Normocephalic and atraumatic.   Cardiovascular:      Rate and Rhythm: Normal rate.   Pulmonary:      Effort: Pulmonary effort is normal.      Breath sounds: Rhonchi present. No wheezing.   Abdominal:      Palpations: Abdomen is soft.   Skin:     General: Skin is warm.   Neurological:      General: No focal deficit present.      Mental Status: He is alert. Mental status is at baseline.   Psychiatric:         Mood and Affect: Mood normal.         Behavior: Behavior normal.               Significant Labs: All pertinent labs within the past 24 hours have been reviewed.    Significant Imaging: I have reviewed all pertinent imaging results/findings within the past 24 hours.      Assessment & Plan  Altered mental state  -CT head unremarkable  -interrogate pacemaker  EEG was performed to assess for subclinical seizures   IMPRESSION:  This is an abnormal EEG during wakefulness, drowsiness and sleep. Rare left temporal focal mixed delta range slowing was noted    Consult neurology > recommended outpatient MRI work up and EEG  No hypercapnia on ABG  Infectious w/u in progress > COVID positive    Combined systolic and diastolic congestive heart failure, NYHA class 3  Patient has Combined Systolic and Diastolic heart failure that is Chronic. On presentation their CHF was well compensated. Most recent BNP and echo results are listed below.  No results for input(s): "BNP" in the last 72 hours.    Latest ECHO  Results for orders placed during the hospital encounter of 01/05/25    Echo    Interpretation Summary    Left Ventricle: The left " ventricle is normal in size. There is concentric hypertrophy. Regional wall motion abnormalities present. See diagram for wall motion findings. There is reduced systolic function. Quantitated ejection fraction is 36%. Unable to assess diastolic function due to poor image quality.    Right Ventricle: Normal right ventricular cavity size. Systolic function is normal. Pacemaker lead present in the ventricle.    Aortic Valve: There is aortic valve sclerosis. Mildly restricted motion. There is mild aortic regurgitation.    Mitral Valve: There is mild regurgitation.    Pulmonary Artery: The estimated pulmonary artery systolic pressure is 19 mmHg.    IVC/SVC: Normal venous pressure at 3 mmHg.    Current Heart Failure Medications  sacubitriL-valsartan  mg per tablet 1 tablet, 2 times daily, Oral  spironolactone tablet 25 mg, Daily, Oral  , Daily, Oral  , Daily, Oral  metoprolol succinate (TOPROL-XL) 24 hr tablet 50 mg, Daily, Oral  furosemide tablet 40 mg, Daily, Oral    Plan  - Monitor strict I&Os and daily weights.    - Place on telemetry  - Low sodium diet  - Place on fluid restriction of 2 L.   - Cardiology has been consulted  - The patient's volume status is at their baseline  -Cardio evaluated the patient , recommended device interrogation  -continue Lasix 40 mg p.o.  -decreasing metoprolol to 50 mg due to bradycardia      Acidosis, metabolic  Mild       AICD (automatic cardioverter/defibrillator) present  Cards recs:  patient has a [employer] single-chamber AICD.    Device interrogation is requested    S/P right coronary artery (RCA) stent placement  Chonic, stable    Stroke  Hx of  Continue chronic home meds  Fever  Negative UA/ CXR upon admission    Mostly related to COVID infection    Acute encephalopathy  Likely metabolic > resolved    Obesity (BMI 30.0-34.9)      VTE Risk Mitigation (From admission, onward)           Ordered     enoxaparin injection 40 mg  Daily         05/17/25 0428     IP VTE HIGH RISK  PATIENT  Once         05/17/25 0428     Place sequential compression device  Until discontinued         05/17/25 0428                    Discharge Planning   OBDULIA: 5/20/2025     Code Status: DNR   Medical Readiness for Discharge Date:   Discharge Plan A: Home                Please place Justification for DME        Goyo Youssef MD  Department of Hospital Medicine   [facility]

## 2025-05-20 NOTE — SUBJECTIVE & OBJECTIVE
Interval History:     I have seen and examined the patient today  Patient is still coughing and sounds congested, requiring 2 L of nasal cannula.    He tested positive for COVID, we will start on remdesivir today    Review of Systems   Constitutional: Negative.    HENT: Negative.     Respiratory:  Positive for cough and shortness of breath.    Cardiovascular: Negative.    Gastrointestinal: Negative.    Genitourinary: Negative.    Musculoskeletal: Negative.    Skin: Negative.    Neurological: Negative.    Psychiatric/Behavioral: Negative.       Objective:     Vital Signs (Most Recent):  Temp: 97.6 °F (36.4 °C) (05/20/25 1130)  Pulse: (!) 56 (05/20/25 1130)  Resp: 20 (05/20/25 1130)  BP: 132/67 (05/20/25 1130)  SpO2: (!) 93 % (05/20/25 1517) Vital Signs (24h Range):  Temp:  [97.5 °F (36.4 °C)-98.8 °F (37.1 °C)] 97.6 °F (36.4 °C)  Pulse:  [50-64] 56  Resp:  [17-20] 20  SpO2:  [92 %-95 %] 93 %  BP: (106-132)/(66-75) 132/67     Weight: 117 kg (257 lb 15 oz)  Body mass index is 34.03 kg/m².    Intake/Output Summary (Last 24 hours) at 5/20/2025 1548  Last data filed at 5/20/2025 1030  Gross per 24 hour   Intake --   Output 750 ml   Net -750 ml         Physical Exam  Constitutional:       Appearance: Normal appearance. He is obese.   HENT:      Head: Normocephalic and atraumatic.   Cardiovascular:      Rate and Rhythm: Normal rate.   Pulmonary:      Effort: Pulmonary effort is normal.      Breath sounds: Rhonchi present. No wheezing.   Abdominal:      Palpations: Abdomen is soft.   Skin:     General: Skin is warm.   Neurological:      General: No focal deficit present.      Mental Status: He is alert. Mental status is at baseline.   Psychiatric:         Mood and Affect: Mood normal.         Behavior: Behavior normal.               Significant Labs: All pertinent labs within the past 24 hours have been reviewed.    Significant Imaging: I have reviewed all pertinent imaging results/findings within the past 24 hours.

## 2025-05-20 NOTE — ASSESSMENT & PLAN NOTE
"Patient has Combined Systolic and Diastolic heart failure that is Chronic. On presentation their CHF was well compensated. Most recent BNP and echo results are listed below.  No results for input(s): "BNP" in the last 72 hours.    Latest ECHO  Results for orders placed during the hospital encounter of 01/05/25    Echo    Interpretation Summary    Left Ventricle: The left ventricle is normal in size. There is concentric hypertrophy. Regional wall motion abnormalities present. See diagram for wall motion findings. There is reduced systolic function. Quantitated ejection fraction is 36%. Unable to assess diastolic function due to poor image quality.    Right Ventricle: Normal right ventricular cavity size. Systolic function is normal. Pacemaker lead present in the ventricle.    Aortic Valve: There is aortic valve sclerosis. Mildly restricted motion. There is mild aortic regurgitation.    Mitral Valve: There is mild regurgitation.    Pulmonary Artery: The estimated pulmonary artery systolic pressure is 19 mmHg.    IVC/SVC: Normal venous pressure at 3 mmHg.    Current Heart Failure Medications  sacubitriL-valsartan  mg per tablet 1 tablet, 2 times daily, Oral  spironolactone tablet 25 mg, Daily, Oral  , Daily, Oral  , Daily, Oral  metoprolol succinate (TOPROL-XL) 24 hr tablet 50 mg, Daily, Oral  furosemide tablet 40 mg, Daily, Oral    Plan  - Monitor strict I&Os and daily weights.    - Place on telemetry  - Low sodium diet  - Place on fluid restriction of 2 L.   - Cardiology has been consulted  - The patient's volume status is at their baseline  -Cardio evaluated the patient , recommended device interrogation  -continue Lasix 40 mg p.o.  -decreasing metoprolol to 50 mg due to bradycardia      "

## 2025-05-20 NOTE — NURSING
RAPID RESPONSE NURSE PROACTIVE ROUNDING NOTE       Time of Visit:     Admit Date: 2025  LOS: 1  Code Status: DNR   Date of Visit: 2025  : 1945  Age: 79 y.o.  Sex: male  Race: White  Bed: K526/K526 A:   MRN: [id number]  Was the patient discharged from an ICU this admission? No   Was the patient discharged from a PACU within last 24 hours? No   Did the patient receive conscious sedation/general anesthesia in last 24 hours? No   Was the patient in the ED within the past 24 hours? No   Was the patient on NIPPV within the past 24 hours? No   Attending Physician: Staci Millard MD  Primary Service: Family Medicine,Hospitalist   Time spent at the bedside: < 15 min    SITUATION    Notified by previous RRN during handoff  Reason for alert: Altered Mental Status    Diagnosis: Altered mental state   has a past medical history of Cardiomyopathy, CHF (congestive heart failure), Coronary artery disease, Diverticulitis large intestine, GERD (gastroesophageal reflux disease), Hypertension, and Obesity.    Last Vitals:  Temp: 98.3 °F (36.8 °C) ( 1555)  Pulse: 64 ( 1555)  Resp: 17 ( 1555)  BP: 118/75 ( 1555)  SpO2: 94 % ( 1555)    24 Hour Vitals Range:  Temp:  [98 °F (36.7 °C)-101 °F (38.3 °C)]   Pulse:  [55-64]   Resp:  [17-22]   BP: (104-130)/(65-79)   SpO2:  [91 %-96 %]     Clinical Issues: Neuro    ASSESSMENT/INTERVENTIONS    Followed up on patient for proactive rounding. Chart and labs reviewed. Patient Covid positive. Cardiology and Neuro following. PRN Guaifenesin in place for congestion and cough.    Temp 98.8 (37.1)  /71 MAP 87  HR 57  SpO2 92% on 2L NC      Discussed plan of care with Charge RNSabina    PROVIDER ESCALATION    Physician escalation: No    Orders received and case discussed with NA.    Disposition:Remain in room 526    FOLLOW UP    Call back the Rapid Response NurseGuillermina at [phone] for additional questions or concerns.

## 2025-05-21 PROBLEM — G93.40 ACUTE ENCEPHALOPATHY: Status: RESOLVED | Noted: 2025-05-19 | Resolved: 2025-05-21

## 2025-05-21 PROBLEM — U07.1 COVID-19: Status: ACTIVE | Noted: 2025-05-21

## 2025-05-21 PROCEDURE — 97116 GAIT TRAINING THERAPY: CPT | Mod: CQ

## 2025-05-21 PROCEDURE — 21400001 HC TELEMETRY ROOM

## 2025-05-21 PROCEDURE — 27000207 HC ISOLATION

## 2025-05-21 PROCEDURE — 25000003 PHARM REV CODE 250: Performed by: INTERNAL MEDICINE

## 2025-05-21 PROCEDURE — 25000003 PHARM REV CODE 250: Performed by: STUDENT IN AN ORGANIZED HEALTH CARE EDUCATION/TRAINING PROGRAM

## 2025-05-21 PROCEDURE — 63600175 PHARM REV CODE 636 W HCPCS: Mod: JZ,TB | Performed by: STUDENT IN AN ORGANIZED HEALTH CARE EDUCATION/TRAINING PROGRAM

## 2025-05-21 PROCEDURE — 27000221 HC OXYGEN, UP TO 24 HOURS

## 2025-05-21 PROCEDURE — 97530 THERAPEUTIC ACTIVITIES: CPT | Mod: CQ

## 2025-05-21 PROCEDURE — 94761 N-INVAS EAR/PLS OXIMETRY MLT: CPT

## 2025-05-21 PROCEDURE — 97535 SELF CARE MNGMENT TRAINING: CPT | Mod: CO

## 2025-05-21 PROCEDURE — 63600175 PHARM REV CODE 636 W HCPCS: Performed by: INTERNAL MEDICINE

## 2025-05-21 PROCEDURE — 97530 THERAPEUTIC ACTIVITIES: CPT | Mod: CO

## 2025-05-21 RX ADMIN — GUAIFENESIN 200 MG: 100 SOLUTION ORAL at 03:05

## 2025-05-21 RX ADMIN — REMDESIVIR 100 MG: 100 INJECTION, POWDER, LYOPHILIZED, FOR SOLUTION INTRAVENOUS at 09:05

## 2025-05-21 RX ADMIN — GUAIFENESIN 200 MG: 100 SOLUTION ORAL at 09:05

## 2025-05-21 RX ADMIN — SACUBITRIL AND VALSARTAN 1 TABLET: 97; 103 TABLET, FILM COATED ORAL at 09:05

## 2025-05-21 RX ADMIN — SPIRONOLACTONE 25 MG: 25 TABLET, FILM COATED ORAL at 09:05

## 2025-05-21 RX ADMIN — PANTOPRAZOLE SODIUM 40 MG: 40 TABLET, DELAYED RELEASE ORAL at 09:05

## 2025-05-21 RX ADMIN — GUAIFENESIN 200 MG: 100 SOLUTION ORAL at 10:05

## 2025-05-21 RX ADMIN — CYANOCOBALAMIN TAB 1000 MCG 1000 MCG: 1000 TAB at 09:05

## 2025-05-21 RX ADMIN — ENOXAPARIN SODIUM 40 MG: 40 INJECTION SUBCUTANEOUS at 05:05

## 2025-05-21 RX ADMIN — FUROSEMIDE 40 MG: 40 TABLET ORAL at 09:05

## 2025-05-21 RX ADMIN — ASPIRIN 81 MG: 81 TABLET, COATED ORAL at 09:05

## 2025-05-21 RX ADMIN — MELATONIN TAB 3 MG 6 MG: 3 TAB at 09:05

## 2025-05-21 RX ADMIN — CLOPIDOGREL BISULFATE 75 MG: 75 TABLET, FILM COATED ORAL at 09:05

## 2025-05-21 NOTE — ASSESSMENT & PLAN NOTE
Resolved   -CT head unremarkable  -interrogate pacemaker  EEG was performed to assess for subclinical seizures   IMPRESSION:  This is an abnormal EEG during wakefulness, drowsiness and sleep. Rare left temporal focal mixed delta range slowing was noted    Consult neurology > recommended outpatient MRI work up and EEG  No hypercapnia on ABG  Infectious w/u in progress > COVID positive

## 2025-05-21 NOTE — SUBJECTIVE & OBJECTIVE
Interval History:     I have seen and examined the patient today  Patient is sitting at bedside, feeling better  When discussed PT recs for rehab with him he initially refused but then agreed after better understanding.    He is less congested than yesterday and his oxygen requirement is less at rest.    Review of Systems   Constitutional: Negative.    HENT: Negative.     Respiratory:  Positive for cough.    Cardiovascular: Negative.    Gastrointestinal: Negative.    Genitourinary: Negative.    Musculoskeletal: Negative.    Skin: Negative.    Neurological: Negative.    Psychiatric/Behavioral: Negative.       Objective:     Vital Signs (Most Recent):  Temp: 97.4 °F (36.3 °C) (05/21/25 1129)  Pulse: 60 (05/21/25 1129)  Resp: 20 (05/21/25 1129)  BP: (!) 144/77 (05/21/25 1129)  SpO2: (!) 92 % (05/21/25 0741) Vital Signs (24h Range):  Temp:  [97.4 °F (36.3 °C)-99.4 °F (37.4 °C)] 97.4 °F (36.3 °C)  Pulse:  [51-70] 60  Resp:  [20-22] 20  SpO2:  [92 %-96 %] 92 %  BP: (107-144)/(63-77) 144/77     Weight: 117 kg (257 lb 15 oz)  Body mass index is 34.03 kg/m².    Intake/Output Summary (Last 24 hours) at 5/21/2025 1309  Last data filed at 5/21/2025 0823  Gross per 24 hour   Intake 240 ml   Output 800 ml   Net -560 ml         Physical Exam  Constitutional:       Appearance: Normal appearance. He is obese.   HENT:      Head: Normocephalic and atraumatic.   Cardiovascular:      Rate and Rhythm: Normal rate.   Pulmonary:      Effort: Pulmonary effort is normal.      Breath sounds: Rhonchi present. No wheezing.   Abdominal:      Palpations: Abdomen is soft.   Skin:     General: Skin is warm.   Neurological:      General: No focal deficit present.      Mental Status: He is alert. Mental status is at baseline.   Psychiatric:         Mood and Affect: Mood normal.         Behavior: Behavior normal.               Significant Labs: All pertinent labs within the past 24 hours have been reviewed.    Significant Imaging: I have reviewed all  pertinent imaging results/findings within the past 24 hours.

## 2025-05-21 NOTE — ASSESSMENT & PLAN NOTE
Patient is identified as Severe COVID-19 based on hypoxemia with O2 saturations <94% on room air or on ambulation   Initiate standard COVID protocols; COVID-19 testing ,Infection Control notification  and isolation- respiratory, contact and droplet per protocol    Diagnostics: Portable CXR    Management: Initiate targeted therapy with Remdesivir, 200mg IV x1, followed by 100mg IV daily x5 days total, Maintain oxygen saturations 92-96% via Nasal Cannula 3 LPM and monitor with continuous/intermittent pulse oximetry. , Inhaled bronchodilators as needed for shortness of breath., and Continuous cardiac monitoring.    Advance Care Planning  Current advance care plan has not been discussed with patient/family/POA and patient currently wishes DNR (Do Not Resuscitate).

## 2025-05-21 NOTE — PT/OT/SLP PROGRESS
Occupational Therapy   Treatment    Name: Nimesh Saini  MRN: [id number]  Admitting Diagnosis:  Altered mental state       Recommendations:     Discharge Recommendations: High Intensity Therapy  Discharge Equipment Recommendations:  to be determined by next level of care  Barriers to discharge:  Decreased caregiver support, Other (Comment) (Increased level of assistance)    Assessment:     Nimesh Saini is a 79 y.o. male with a medical diagnosis of Altered mental state.  Performance deficits affecting function are weakness, impaired endurance, impaired self care skills, impaired functional mobility, gait instability, impaired balance, decreased lower extremity function, decreased upper extremity function, decreased safety awareness, impaired cardiopulmonary response to activity.    Pt found in bed, agreeable to therapy. Pt progressing towards goals. Continue OT services to address functional goals, progressing as able.      Rehab Prognosis:  Good; patient would benefit from acute skilled OT services to address these deficits and reach maximum level of function.       Plan:     Patient to be seen 5 x/week to address the above listed problems via self-care/home management, therapeutic activities, therapeutic exercises  Plan of Care Expires: 06/19/25  Plan of Care Reviewed with: patient    Subjective     Chief Complaint: coughing   Patient/Family Comments/goals: to return to prior level of functioning   Pain/Comfort:  Pain Rating 1: 0/10  Pain Rating Post-Intervention 1: 0/10    Objective:     Communicated with: nurse prior to session.  Patient found HOB elevated with bed alarm, peripheral IV, telemetry upon OT entry to room.  General Precautions: Standard, fall    Orthopedic Precautions:N/A  Braces: N/A  Respiratory Status: Nasal cannula, flow 2 L/min     Occupational Performance:     Bed Mobility:    Patient completed Rolling/Turning to Right with stand by assistance  Patient completed Scooting/Bridging with stand by  assistance  Patient completed Supine to Sit with contact guard assistance, HOB elevated, use of bed rail, increased time and effort, vc's for effective technique    Functional Mobility/Transfers:  Patient completed Sit <> Stand Transfer with contact guard assistance and minimum assistance with  rolling walker and vcs for hand placement x 3 trials.   Patient completed Bed <> Chair Transfer using Stand Pivot technique with contact guard assistance and minimum assistance with rolling walker.  Functional Mobility: Pt ambulated room distances with CGA/Min A using RW. Pt requires vcs and assist for RW safety/mgmt/proximity.     Activities of Daily Living:  Grooming: stand by assistance with CGA for standing balance at [facility] 6 Click ADL: 17    Treatment & Education:  Encouraged OOB in chair 1-2 hours and to call for assistance for back to bed and/or toileting needs. Pt verbalizes understanding.      Patient left up in chair with all lines intact, call button in reach, chair alarm on, and nurse notified    GOALS:   Multidisciplinary Problems       Occupational Therapy Goals          Problem: Occupational Therapy    Goal Priority Disciplines Outcome Interventions   Occupational Therapy Goal     OT, PT/OT Progressing    Description: Goals to be met by: 06/19/2025     Patient will increase functional independence with ADLs by performing:    UE Dressing with [place].  LE Dressing with [place].  Grooming while standing with [place].  Toileting from toilet with [place] for hygiene and clothing management.   Step transfer with [place]  Toilet transfer to toilet with [place].                         Time Tracking:     OT Date of Treatment: 05/21/25  OT Start Time: 1003  OT Stop Time: 1026  OT Total Time (min): 23 min    Billable Minutes:Self Care/Home Management 13  Therapeutic Activity 10            5/21/2025

## 2025-05-21 NOTE — NURSING
[employer] rep here to interrogate patient device, reports device is working and without difficulties. Printed report in the chart.

## 2025-05-21 NOTE — PT/OT/SLP PROGRESS
Physical Therapy Treatment    Patient Name:  Nimesh Saini   MRN:  [id number]    Recommendations:     Discharge Recommendations: High Intensity Therapy  Discharge Equipment Recommendations: to be determined by next level of care  Barriers to discharge: decreased caregiver support/decreased functional mobility/strength/endurance.    Assessment:     Nimesh Saini is a 79 y.o. male admitted with a medical diagnosis of Altered mental state.  He presents with the following impairments/functional limitations: weakness, impaired endurance, impaired functional mobility, gait instability, impaired balance, impaired self care skills, decreased lower extremity function, decreased upper extremity function, decreased safety awareness, impaired cardiopulmonary response to activity, decreased coordination Pt would continue to benefit from P.T. To address impairments listed above.  .    Rehab Prognosis: Fair; patient would benefit from acute skilled PT services to address these deficits and reach maximum level of function.    Recent Surgery: * No surgery found *      Plan:     During this hospitalization, patient to be seen 5 x/week to address the identified rehab impairments via gait training, therapeutic activities, therapeutic exercises, neuromuscular re-education and progress toward the following goals:    Plan of Care Expires:  06/19/25    Subjective     Patient/Family Comments/goals: Pt agreed to tx  Pain/Comfort:  Pain Rating 1: 0/10  Pain Rating Post-Intervention 1: 0/10      Objective:     Communicated with RN prior to session.  Patient found HOB elevated with bed alarm, telemetry, peripheral IV upon PT entry to room.     General Precautions: Standard, fall  Orthopedic Precautions: N/A  Braces: N/A  Respiratory Status: Room air     Functional Mobility:  Bed Mobility:     Rolling Left:  contact guard assistance, and vc's for hand placement with b/r for assist  Scooting: stand by assistance and to EOB  Supine to Sit: contact  "guard assistance  Sit to Supine: minimum assistance and LE onto EOB  Transfers:     Sit to Stand:  minimum assistance with rolling walker and vc's for hand placement with initial mild posterior lean.  2 reps  Gait: 20ft x 2 with RW, CGA/Ya, and a seated rest between bouts secondary to decreased strength/endurance, and mild SOB.  Pt ambulates with decreased yordan, decreased step length, trunk flexion and vc's for head up posture and closer proximity to Rw.  Pt required occasional Ya for Rw management/stability when turning and ambulates alternating between 3pt gait and reciprocal gait.  Balance: sitting good-, standing fair Rw, gait fair/fair- Rw      AM-PAC 6 CLICK MOBILITY  Turning over in bed (including adjusting bedclothes, sheets and blankets)?: 3  Sitting down on and standing up from a chair with arms (e.g., wheelchair, bedside commode, etc.): 3  Moving from lying on back to sitting on the side of the bed?: 3  Moving to and from a bed to a chair (including a wheelchair)?: 3  Need to walk in hospital room?: 3  Climbing 3-5 steps with a railing?: 2  Basic Mobility Total Score: 17       Treatment & Education:  Pt education on the goals for PT tx today.  Pt found supine with HOB elevated and O2 off lying in bed next to pt.  Pt reports, "they are seeing how I am doing without it."  O2 sats at rest 93% and with gait 90-92%.  RN (Kaitlyn) notified.  Seated BLE therex: APs, LAQs, hip flexion 10 x 2 reps with vc's/tc's for proper technique.  O2 sats 91-92% off O2.  Pt declined sitting up on b/s chair at end of tx stating he sat up earlier and wanted to lie down.        Patient left HOB elevated with all lines intact, call button in reach, bed alarm on, and Rn notified..    GOALS:   Multidisciplinary Problems       Physical Therapy Goals          Problem: Physical Therapy    Goal Priority Disciplines Outcome Interventions   Physical Therapy Goal     PT, PT/OT Progressing    Description: Goals to be met by: 6/19/25 "     Patient will increase functional independence with mobility by performin. Supine to sit with Stand-by Assistance  2. Sit to supine with Stand-by Assistance  3. Sit to stand transfer with Stand-by Assistance  4. Bed to chair transfer with Stand-by Assistance using Rolling Walker  5. Gait  x 50 feet with Stand-by Assistance using Rolling Walker.   6. Ascend/descend 1 flight of stairs with 1-2 Handrails Contact Guard Assistance using No Assistive Device.                            Time Tracking:     PT Received On: 25  PT Start Time:      PT Stop Time: 1547  PT Total Time (min): 23 min     Billable Minutes: Gait Training 11 and Therapeutic Activity 12    Treatment Type: Treatment  PT/PTA: PTA     Number of PTA visits since last PT visit: 2     2025

## 2025-05-21 NOTE — NURSING
Received report from Cody, received the patient awake and alert, bed locked in low with call light in reach. Patient ambulated to the bathroom,

## 2025-05-21 NOTE — PLAN OF CARE
Problem: Adult Inpatient Plan of Care  Goal: Plan of Care Review  Outcome: Progressing  Goal: Optimal Comfort and Wellbeing  Outcome: Progressing     Problem: Fall Injury Risk  Goal: Absence of Fall and Fall-Related Injury  Outcome: Progressing     Problem: Comorbidity Management  Goal: Maintenance of Heart Failure Symptom Control  Outcome: Progressing     Problem: Skin Injury Risk Increased  Goal: Skin Health and Integrity  Outcome: Progressing     Pt safety maintained. Pt on 3L O2 via NC and continuous cardiac monitoring. Medication administered per MAR. Pt instructed to call w/any needs, verbalized understanding. Bed in lowest position, locked and bed alarms on. Call light w/in pt's reach.

## 2025-05-21 NOTE — PROGRESS NOTES
[facility] Medicine  Progress Note    Patient Name: Nimesh Saini  MRN: [id number]  Patient Class: IP- Inpatient   Admission Date: 2025  Length of Stay: 3 days  Attending Physician: Goyo Youssef MD  Primary Care Provider: No primary care provider on file.        Subjective     Principal Problem:Altered mental state        HPI:  79-year-old male with extensive medical history including ischemic cardiomyopathy with an EF of less than 15% and multivessel CAD, presents brought in by EMS for altered mental status.  Patient drove to a gas station this evening, and after several hours of not leaving was noted to be lying on the ground next their car by gas station employees.  He had been on the way to a .     He states he was driving and trying to find a [employer]ucian[employer] and it was like he lost control of everything. He  last remembers looking for the Confucianism.  No bladder or bowel incontinence. No tongue biting.     Mild leg swelling that is new. He has been eating a lot of crawfish and crabs.       Overview/Hospital Course:  No notes on file    Interval History:     I have seen and examined the patient today  Patient is sitting at bedside, feeling better  When discussed PT recs for rehab with him he initially refused but then agreed after better understanding.    He is less congested than yesterday and his oxygen requirement is less at rest.    Review of Systems   Constitutional: Negative.    HENT: Negative.     Respiratory:  Positive for cough.    Cardiovascular: Negative.    Gastrointestinal: Negative.    Genitourinary: Negative.    Musculoskeletal: Negative.    Skin: Negative.    Neurological: Negative.    Psychiatric/Behavioral: Negative.       Objective:     Vital Signs (Most Recent):  Temp: 97.4 °F (36.3 °C) (25 1129)  Pulse: 60 (25 1129)  Resp: 20 (25 1129)  BP: (!) 144/77 (25 1129)  SpO2: (!) 92 % (25 0741) Vital Signs (24h Range):  Temp:  [97.4 °F (36.3  "°C)-99.4 °F (37.4 °C)] 97.4 °F (36.3 °C)  Pulse:  [51-70] 60  Resp:  [20-22] 20  SpO2:  [92 %-96 %] 92 %  BP: (107-144)/(63-77) 144/77     Weight: 117 kg (257 lb 15 oz)  Body mass index is 34.03 kg/m².    Intake/Output Summary (Last 24 hours) at 5/21/2025 1309  Last data filed at 5/21/2025 0823  Gross per 24 hour   Intake 240 ml   Output 800 ml   Net -560 ml         Physical Exam  Constitutional:       Appearance: Normal appearance. He is obese.   HENT:      Head: Normocephalic and atraumatic.   Cardiovascular:      Rate and Rhythm: Normal rate.   Pulmonary:      Effort: Pulmonary effort is normal.      Breath sounds: Rhonchi present. No wheezing.   Abdominal:      Palpations: Abdomen is soft.   Skin:     General: Skin is warm.   Neurological:      General: No focal deficit present.      Mental Status: He is alert. Mental status is at baseline.   Psychiatric:         Mood and Affect: Mood normal.         Behavior: Behavior normal.               Significant Labs: All pertinent labs within the past 24 hours have been reviewed.    Significant Imaging: I have reviewed all pertinent imaging results/findings within the past 24 hours.      Assessment & Plan  Altered mental state  Resolved   -CT head unremarkable  -interrogate pacemaker  EEG was performed to assess for subclinical seizures   IMPRESSION:  This is an abnormal EEG during wakefulness, drowsiness and sleep. Rare left temporal focal mixed delta range slowing was noted    Consult neurology > recommended outpatient MRI work up and EEG  No hypercapnia on ABG  Infectious w/u in progress > COVID positive    Combined systolic and diastolic congestive heart failure, NYHA class 3  Patient has Combined Systolic and Diastolic heart failure that is Chronic. On presentation their CHF was well compensated. Most recent BNP and echo results are listed below.  No results for input(s): "BNP" in the last 72 hours.    Latest ECHO  Results for orders placed during the hospital " encounter of 01/05/25    Echo    Interpretation Summary    Left Ventricle: The left ventricle is normal in size. There is concentric hypertrophy. Regional wall motion abnormalities present. See diagram for wall motion findings. There is reduced systolic function. Quantitated ejection fraction is 36%. Unable to assess diastolic function due to poor image quality.    Right Ventricle: Normal right ventricular cavity size. Systolic function is normal. Pacemaker lead present in the ventricle.    Aortic Valve: There is aortic valve sclerosis. Mildly restricted motion. There is mild aortic regurgitation.    Mitral Valve: There is mild regurgitation.    Pulmonary Artery: The estimated pulmonary artery systolic pressure is 19 mmHg.    IVC/SVC: Normal venous pressure at 3 mmHg.    Current Heart Failure Medications  sacubitriL-valsartan  mg per tablet 1 tablet, 2 times daily, Oral  spironolactone tablet 25 mg, Daily, Oral  , Daily, Oral  , Daily, Oral  metoprolol succinate (TOPROL-XL) 24 hr tablet 50 mg, Daily, Oral  furosemide tablet 40 mg, Daily, Oral    Plan  - Monitor strict I&Os and daily weights.    - Place on telemetry  - Low sodium diet  - Place on fluid restriction of 2 L.   - Cardiology has been consulted  - The patient's volume status is at their baseline  -Cardio evaluated the patient , recommended device interrogation  -continue Lasix 40 mg p.o.  -decreasing metoprolol to 50 mg due to bradycardia      Acidosis, metabolic  Mild       AICD (automatic cardioverter/defibrillator) present  Cards recs:  patient has a [employer]onic single-chamber AICD.    Device interrogation is requested    S/P right coronary artery (RCA) stent placement  Chonic, stable    Stroke  Hx of  Continue chronic home meds  Fever  Negative UA/ CXR upon admission    Mostly related to COVID infection    Acute encephalopathy (Resolved: 5/21/2025)  Likely metabolic > resolved    Obesity (BMI 30.0-34.9)      COVID-19  Patient is identified as  Severe COVID-19 based on hypoxemia with O2 saturations <94% on room air or on ambulation   Initiate standard COVID protocols; COVID-19 testing ,Infection Control notification  and isolation- respiratory, contact and droplet per protocol    Diagnostics: Portable CXR    Management: Initiate targeted therapy with Remdesivir, 200mg IV x1, followed by 100mg IV daily x5 days total, Maintain oxygen saturations 92-96% via Nasal Cannula 3 LPM and monitor with continuous/intermittent pulse oximetry. , Inhaled bronchodilators as needed for shortness of breath., and Continuous cardiac monitoring.    Advance Care Planning  Current advance care plan has not been discussed with patient/family/POA and patient currently wishes DNR (Do Not Resuscitate).   VTE Risk Mitigation (From admission, onward)           Ordered     enoxaparin injection 40 mg  Daily         05/17/25 0428     IP VTE HIGH RISK PATIENT  Once         05/17/25 0428     Place sequential compression device  Until discontinued         05/17/25 0428                    Discharge Planning   OBDULIA: 5/22/2025     Code Status: DNR   Medical Readiness for Discharge Date:   Discharge Plan A: Home                Please place Justification for DME        Goyo Youssef MD  Department of Hospital Medicine   [facility]

## 2025-05-21 NOTE — PLAN OF CARE
0905  ROQUE questioned Laura w/[facility] regarding pt's referral status. Laura stated that the pt will need to have completed his remdesivir doses prior to admission & taht she will meet with the pt today.        05/21/25 1105   Rounds   Attendance Provider;Nurse    Discharge Plan A Rehab   Why the patient remains in the hospital Requires continued medical care   Transition of Care Barriers Transportation     1105  Patient awake & alert sitting in the recliner when ROQUE participated in SIBR with Dr Youssef & pharmacist Brian.     Pt was admitted with AMS and (+) covid 5/19/2025 & continues to be followed by PT/OT. Pox 92% on 3L O2 via NC this AM. MD stated that the pt will complete his remdesivir doses tomorrow.     ROQUE informed the pt of PT/OT recs for IRF placement following discharge & of referral sent to [facility]. Pt verbalized understanding & agreement.       Will continue to follow.

## 2025-05-21 NOTE — PLAN OF CARE
Problem: Occupational Therapy  Goal: Occupational Therapy Goal  Description: Goals to be met by: 06/19/2025     Patient will increase functional independence with ADLs by performing:    UE Dressing with Modified [place].  LE Dressing with Modified [place].  Grooming while standing with Modified [place].  Toileting from toilet with Modified [place] for hygiene and clothing management.   Step transfer with Modified [place]  Toilet transfer to toilet with Modified [place].    Outcome: Progressing   Nimesh Saini is a 79 y.o. male with a medical diagnosis of Altered mental state.  Performance deficits affecting function are weakness, impaired endurance, impaired self care skills, impaired functional mobility, gait instability, impaired balance, decreased lower extremity function, decreased upper extremity function, decreased safety awareness, impaired cardiopulmonary response to activity.    Pt found in bed, agreeable to therapy. Pt progressing towards goals. Continue OT services to address functional goals, progressing as able.

## 2025-05-22 PROBLEM — R41.82 ALTERED MENTAL STATE: Status: RESOLVED | Noted: 2025-05-17 | Resolved: 2025-05-22

## 2025-05-22 LAB — COHGB MFR BLD: NORMAL %SATURATION (ref 1.5–5)

## 2025-05-22 PROCEDURE — 94761 N-INVAS EAR/PLS OXIMETRY MLT: CPT

## 2025-05-22 PROCEDURE — 25000003 PHARM REV CODE 250: Performed by: INTERNAL MEDICINE

## 2025-05-22 PROCEDURE — 27000207 HC ISOLATION

## 2025-05-22 PROCEDURE — 25000003 PHARM REV CODE 250: Performed by: STUDENT IN AN ORGANIZED HEALTH CARE EDUCATION/TRAINING PROGRAM

## 2025-05-22 PROCEDURE — 99900035 HC TECH TIME PER 15 MIN (STAT)

## 2025-05-22 PROCEDURE — 21400001 HC TELEMETRY ROOM

## 2025-05-22 PROCEDURE — 97530 THERAPEUTIC ACTIVITIES: CPT | Mod: CO

## 2025-05-22 PROCEDURE — 97110 THERAPEUTIC EXERCISES: CPT | Mod: CO

## 2025-05-22 PROCEDURE — 97116 GAIT TRAINING THERAPY: CPT | Mod: CQ

## 2025-05-22 PROCEDURE — 97530 THERAPEUTIC ACTIVITIES: CPT | Mod: CQ

## 2025-05-22 PROCEDURE — 63600175 PHARM REV CODE 636 W HCPCS: Mod: JZ,TB | Performed by: STUDENT IN AN ORGANIZED HEALTH CARE EDUCATION/TRAINING PROGRAM

## 2025-05-22 PROCEDURE — 63600175 PHARM REV CODE 636 W HCPCS: Performed by: INTERNAL MEDICINE

## 2025-05-22 RX ORDER — BENZONATATE 100 MG/1
100 CAPSULE ORAL 3 TIMES DAILY PRN
Status: DISCONTINUED | OUTPATIENT
Start: 2025-05-22 | End: 2025-05-25 | Stop reason: HOSPADM

## 2025-05-22 RX ADMIN — GUAIFENESIN 200 MG: 100 SOLUTION ORAL at 02:05

## 2025-05-22 RX ADMIN — SACUBITRIL AND VALSARTAN 1 TABLET: 97; 103 TABLET, FILM COATED ORAL at 08:05

## 2025-05-22 RX ADMIN — GUAIFENESIN 200 MG: 100 SOLUTION ORAL at 05:05

## 2025-05-22 RX ADMIN — MELATONIN TAB 3 MG 6 MG: 3 TAB at 08:05

## 2025-05-22 RX ADMIN — FUROSEMIDE 40 MG: 40 TABLET ORAL at 08:05

## 2025-05-22 RX ADMIN — CYANOCOBALAMIN TAB 1000 MCG 1000 MCG: 1000 TAB at 08:05

## 2025-05-22 RX ADMIN — GUAIFENESIN 200 MG: 100 SOLUTION ORAL at 09:05

## 2025-05-22 RX ADMIN — REMDESIVIR 100 MG: 100 INJECTION, POWDER, LYOPHILIZED, FOR SOLUTION INTRAVENOUS at 08:05

## 2025-05-22 RX ADMIN — SPIRONOLACTONE 25 MG: 25 TABLET, FILM COATED ORAL at 08:05

## 2025-05-22 RX ADMIN — BENZONATATE 100 MG: 100 CAPSULE ORAL at 05:05

## 2025-05-22 RX ADMIN — CLOPIDOGREL BISULFATE 75 MG: 75 TABLET, FILM COATED ORAL at 08:05

## 2025-05-22 RX ADMIN — PANTOPRAZOLE SODIUM 40 MG: 40 TABLET, DELAYED RELEASE ORAL at 08:05

## 2025-05-22 RX ADMIN — GUAIFENESIN 200 MG: 100 SOLUTION ORAL at 08:05

## 2025-05-22 RX ADMIN — ACETAMINOPHEN 650 MG: 325 TABLET ORAL at 05:05

## 2025-05-22 RX ADMIN — ENOXAPARIN SODIUM 40 MG: 40 INJECTION SUBCUTANEOUS at 05:05

## 2025-05-22 NOTE — PT/OT/SLP PROGRESS
Physical Therapy Treatment    Patient Name:  Nimesh Saini   MRN:  [id number]    Recommendations:     Discharge Recommendations: High Intensity Therapy  Discharge Equipment Recommendations: to be determined by next level of care  Barriers to discharge: Decreased caregiver support and increased level of assistance from PLOF    Assessment:     Nimesh Saini is a 79 y.o. male admitted with a medical diagnosis of Altered mental state.  He presents with the following impairments/functional limitations: weakness, impaired endurance, impaired self care skills, impaired functional mobility, gait instability, impaired balance, decreased safety awareness, impaired cardiopulmonary response to activity, decreased lower extremity function, decreased coordination Pt would continue to benefit from P.T. To address impairments listed above.  .    Rehab Prognosis: Fair+; patient would benefit from acute skilled PT services to address these deficits and reach maximum level of function.    Recent Surgery: * No surgery found *      Plan:     During this hospitalization, patient to be seen 5 x/week to address the identified rehab impairments via gait training, therapeutic activities, therapeutic exercises, neuromuscular re-education and progress toward the following goals:    Plan of Care Expires:  06/19/25    Subjective       Patient/Family Comments/goals: Pt agreed to tx  Pain/Comfort:  Pain Rating 1: 0/10  Pain Rating Post-Intervention 1: 0/10      Objective:     Communicated with RN prior to session.  Patient found sitting edge of bed with peripheral IV, telemetry upon PT entry to room.     General Precautions: Standard, fall  Orthopedic Precautions: N/A  Braces: N/A  Respiratory Status: Room air     Functional Mobility:  Bed Mobility:     Scooting: stand by assistance and to EOB  Sit to Supine: moderate assistance and BLEs onto EOB   Transfers:     Sit to Stand:  contact guard assistance and minimum assistance with rolling walker and  vc's for hand placement x 3 reps.  Bed <> Chair: contact guard assistance and minimum assistance with  rolling walker  using  Step Transfer  Gait: 2-3 small turning steps x 2 from b/schair to EOB with CGA/Ya.  Pt then ambulated 15ft with Rw and Ya/CGA with vc's for upright posture and foot inside RW boundaries when turning.   VC's and occasional Ya for Rw management when turning RW and for increased stability. Pt ambulates with decreased yordan, decreased step length, trunk flexion, increased proximity to Rw.  Balance: sitting good-, standing fair Rw, gait fair/fair- Rw      AM-PAC 6 CLICK MOBILITY  Turning over in bed (including adjusting bedclothes, sheets and blankets)?: 3  Moving from lying on back to sitting on the side of the bed?: 3  Moving to and from a bed to a chair (including a wheelchair)?: 3  Need to walk in hospital room?: 3  Climbing 3-5 steps with a railing?: 2       Treatment & Education:  Pt found sitting EOB just finished transferring from b/s chair and chair alarm going off. Pt was instructed to call for assistance when he was ready to returned to bed for increased safety and stability.  Pt urinated in bed and was then assisted with transfer EOB to b/s chair with RW and CGA/Ya for RW management while turning and increased stability.  PTA assisted with bed linen change, then assisted pt with gown change with Mod A.  Static standing with RW and CGA while assisting pt with hygiene needs. Seated BLE therex AP, LAQs, hip flexion x 10 reps.   Pt ambulated 15 ft as above and returned to bed supine with Mod A for BLEs onto EOB with increased time and effort for effective technique.  Pt tolerated sitting up in b/s chair ~ 4 hours today.    Patient left HOB elevated with all lines intact, call button in reach, bed alarm on, and Rn notified..    GOALS:   Multidisciplinary Problems       Physical Therapy Goals          Problem: Physical Therapy    Goal Priority Disciplines Outcome Interventions    Physical Therapy Goal     PT, PT/OT Progressing    Description: Goals to be met by: 25     Patient will increase functional independence with mobility by performin. Supine to sit with Stand-by Assistance  2. Sit to supine with Stand-by Assistance  3. Sit to stand transfer with Stand-by Assistance  4. Bed to chair transfer with Stand-by Assistance using Rolling Walker  5. Gait  x 50 feet with Stand-by Assistance using Rolling Walker.   6. Ascend/descend 1 flight of stairs with 1-2 Handrails Contact Guard Assistance using No Assistive Device.                          Time Tracking:     PT Received On: 25  PT Start Time: 1601     PT Stop Time: 1633  PT Total Time (min): 32 min     Billable Minutes: Gait Training 10 and Therapeutic Activity 22    Treatment Type: Treatment  PT/PTA: PTA     Number of PTA visits since last PT visit: 3     2025

## 2025-05-22 NOTE — PLAN OF CARE
"0815  Patient resting quietly in bed when CM rounded. Pt in agreement to discharge to [facility]. Signed "Pt Choice" form placed in the pt's chart & transportation packet left at the nurse's station.        05/22/25 1045   Rounds   Attendance Provider;Nurse    Discharge Plan A Rehab   Why the patient remains in the hospital Requires continued medical care   Transition of Care Barriers Transportation     1045  Patient awake & alert sitting in the recliner when CM participated in SIBR with Dr Davenport, pharmacist Brian, & nurse Kaitlyn.      Pt was admitted with AMS and (+) covid 5/19/2025 & continues to be followed by PT/OT. Pox 93% this AM.     1140  CM was informed by Laura redman/[facility] that the pt will need to complete 5 days of remdesivir prior to be accepted to [facility]. Message sent to Dr Youssef informing of above. MD verbalized understanding.       Will continue to follow.    "

## 2025-05-22 NOTE — NURSING
Received report from Cody, received the patient sitting up in the chair, awake and alert with call light in reach at present, instructed to call for assistance to get up, patient voiced understanding.

## 2025-05-22 NOTE — PT/OT/SLP PROGRESS
Occupational Therapy   Treatment    Name: Nimesh Saini  MRN: [id number]  Admitting Diagnosis:  Altered mental state       Recommendations:     Discharge Recommendations: High Intensity Therapy  Discharge Equipment Recommendations:  to be determined by next level of care  Barriers to discharge:  Decreased caregiver support, Other (Comment) (Increased level of assistance)    Assessment:     Nimesh Saini is a 79 y.o. male with a medical diagnosis of Altered mental state.  Performance deficits affecting function are weakness, impaired endurance, impaired self care skills, impaired functional mobility, gait instability, impaired balance, decreased lower extremity function, decreased safety awareness, impaired coordination, impaired cardiopulmonary response to activity.    Pt found in bed, agreeable to therapy.  Pt is progressing towards goals. Rec High Intensity Therapy Post Acute Care  at time of discharge to maximize [place] and safety with ADL's and functional mobility. Continue OT services to address functional goals, progressing as able.      Rehab Prognosis:  Good; patient would benefit from acute skilled OT services to address these deficits and reach maximum level of function.       Plan:     Patient to be seen 5 x/week to address the above listed problems via therapeutic activities, therapeutic exercises  Plan of Care Expires: 06/19/25  Plan of Care Reviewed with: patient    Subjective     Chief Complaint: not getting enough activity in room   Patient/Family Comments/goals: to go to rehab   Pain/Comfort:  Pain Rating 1: 0/10  Pain Rating Post-Intervention 1: 0/10    Objective:     Communicated with: nurse prior to session.  Patient found right sidelying with bed alarm, peripheral IV, telemetry upon OT entry to room.    General Precautions: Standard, fall    Orthopedic Precautions:N/A  Braces: N/A  Respiratory Status: Room air     Occupational Performance:     Bed Mobility:    Patient completed Supine to Sit  with stand by assistance HOB elevated, use of bed rail, increased time and effort, vc's for effective technique    Functional Mobility/Transfers:  Patient completed Sit <> Stand Transfer with contact guard assistance  with  rolling walker   Patient completed Bed <> Chair Transfer using Stand Pivot technique with contact guard assistance with rolling walker  Functional Mobility: Pt ambulated room distances with CGA/Min A using RW. Pt requires vcs and assist for RW safety/mgmt/proximity.     [facility] 6 Click ADL: 17    Treatment & Education:  Pt's mood somber. Pt enjoyed conversing about gardening as this is a hobby he enjoys. He was thankful for the conversation.   Pt performed BUE AROM ex 2 x 10 reps all jts/planes.  Pt tolerated well with rest breaks 2/2 fatigue and increased coughing.   Encouraged OOB in chair 1-2 hours and to call for assistance for back to bed and/or toileting needs. Pt verbalizes understanding.         Patient left up in chair with all lines intact, call button in reach, and chair alarm on, nurse notified.     GOALS:   Multidisciplinary Problems       Occupational Therapy Goals          Problem: Occupational Therapy    Goal Priority Disciplines Outcome Interventions   Occupational Therapy Goal     OT, PT/OT Progressing    Description: Goals to be met by: 06/19/2025     Patient will increase functional independence with ADLs by performing:    UE Dressing with Modified [place].  LE Dressing with Modified [place].  Grooming while standing with Modified [place].  Toileting from toilet with Modified [place] for hygiene and clothing management.   Step transfer with Modified [place]  Toilet transfer to toilet with Modified [place].                         Time Tracking:     OT Date of Treatment: 05/22/25  OT Start Time: 1133  OT Stop Time: 1203  OT Total Time (min): 30 min    Billable Minutes:Therapeutic Activity 20  Therapeutic Exercise 10            5/22/2025

## 2025-05-22 NOTE — PHYSICIAN QUERY
Please specify acuity of - combined systolic & diastolic CHF -     Chronic combined systolic and diastolic heart failure

## 2025-05-22 NOTE — PHYSICIAN QUERY
Please specify the etiology of - metabolic encephalopathy and AMS.      - Select all that apply -     related to positive Covid

## 2025-05-22 NOTE — PLAN OF CARE
Problem: Adult Inpatient Plan of Care  Goal: Plan of Care Review  Outcome: Progressing  Goal: Optimal Comfort and Wellbeing  Outcome: Progressing  Goal: Readiness for Transition of Care  Outcome: Progressing     Problem: Fall Injury Risk  Goal: Absence of Fall and Fall-Related Injury  Outcome: Progressing     Problem: Thought Process Alteration  Goal: Optimal Thought Clarity  Outcome: Progressing     Pt safety maintained. Pt on RA and continuous cardiac monitoring. Medication administered per MAR. Pt instructed to call w/any needs, verbalized understanding. Bed in lowest position and locked. Call light w/in pt's reach.

## 2025-05-22 NOTE — SUBJECTIVE & OBJECTIVE
Interval History:     I have seen and examined the patient today  Patient is sitting at bedside, he is off oxygen since yesterday and saturating well on RA.      Review of Systems   Constitutional: Negative.    HENT: Negative.     Respiratory:  Positive for cough.    Cardiovascular: Negative.    Gastrointestinal: Negative.    Genitourinary: Negative.    Musculoskeletal: Negative.    Skin: Negative.    Neurological: Negative.    Psychiatric/Behavioral: Negative.       Objective:     Vital Signs (Most Recent):  Temp: 97.9 °F (36.6 °C) (05/22/25 1204)  Pulse: 70 (05/22/25 1204)  Resp: 18 (05/22/25 1204)  BP: 128/76 (05/22/25 1204)  SpO2: (!) 94 % (05/22/25 1204) Vital Signs (24h Range):  Temp:  [97.3 °F (36.3 °C)-97.9 °F (36.6 °C)] 97.9 °F (36.6 °C)  Pulse:  [52-70] 70  Resp:  [18-22] 18  SpO2:  [90 %-95 %] 94 %  BP: (101-147)/(63-77) 128/76     Weight: 117 kg (257 lb 15 oz)  Body mass index is 34.03 kg/m².    Intake/Output Summary (Last 24 hours) at 5/22/2025 1341  Last data filed at 5/21/2025 2153  Gross per 24 hour   Intake 223.99 ml   Output 800 ml   Net -576.01 ml         Physical Exam  Constitutional:       Appearance: Normal appearance. He is obese.   HENT:      Head: Normocephalic and atraumatic.   Cardiovascular:      Rate and Rhythm: Normal rate.   Pulmonary:      Effort: Pulmonary effort is normal.      Breath sounds: Normal breath sounds. No wheezing.   Abdominal:      Palpations: Abdomen is soft.   Skin:     General: Skin is warm.   Neurological:      General: No focal deficit present.      Mental Status: He is alert. Mental status is at baseline.   Psychiatric:         Mood and Affect: Mood normal.         Behavior: Behavior normal.               Significant Labs: All pertinent labs within the past 24 hours have been reviewed.    Significant Imaging: I have reviewed all pertinent imaging results/findings within the past 24 hours.

## 2025-05-22 NOTE — PROGRESS NOTES
[facility] Medicine  Progress Note    Patient Name: Nimesh Saini  MRN: [id number]  Patient Class: IP- Inpatient   Admission Date: 2025  Length of Stay: 4 days  Attending Physician: Goyo Youssef MD  Primary Care Provider: No primary care provider on file.        Subjective     Principal Problem:Altered mental state        HPI:  79-year-old male with extensive medical history including ischemic cardiomyopathy with an EF of less than 15% and multivessel CAD, presents brought in by EMS for altered mental status.  Patient drove to a gas station this evening, and after several hours of not leaving was noted to be lying on the ground next their car by gas station employees.  He had been on the way to a .     He states he was driving and trying to find a [employer] and it was like he lost control of everything. He  last remembers looking for the [employer].  No bladder or bowel incontinence. No tongue biting.     Mild leg swelling that is new. He has been eating a lot of crawfish and crabs.       Overview/Hospital Course:  No notes on file    Interval History:     I have seen and examined the patient today  Patient is sitting at bedside, he is off oxygen since yesterday and saturating well on RA.      Review of Systems   Constitutional: Negative.    HENT: Negative.     Respiratory:  Positive for cough.    Cardiovascular: Negative.    Gastrointestinal: Negative.    Genitourinary: Negative.    Musculoskeletal: Negative.    Skin: Negative.    Neurological: Negative.    Psychiatric/Behavioral: Negative.       Objective:     Vital Signs (Most Recent):  Temp: 97.9 °F (36.6 °C) (25 1204)  Pulse: 70 (25 1204)  Resp: 18 (25 1204)  BP: 128/76 (25 1204)  SpO2: (!) 94 % (25 1204) Vital Signs (24h Range):  Temp:  [97.3 °F (36.3 °C)-97.9 °F (36.6 °C)] 97.9 °F (36.6 °C)  Pulse:  [52-70] 70  Resp:  [18-22] 18  SpO2:  [90 %-95 %] 94 %  BP: (101-147)/(63-77) 128/76     Weight: 117 kg  "(257 lb 15 oz)  Body mass index is 34.03 kg/m².    Intake/Output Summary (Last 24 hours) at 5/22/2025 1341  Last data filed at 5/21/2025 2153  Gross per 24 hour   Intake 223.99 ml   Output 800 ml   Net -576.01 ml         Physical Exam  Constitutional:       Appearance: Normal appearance. He is obese.   HENT:      Head: Normocephalic and atraumatic.   Cardiovascular:      Rate and Rhythm: Normal rate.   Pulmonary:      Effort: Pulmonary effort is normal.      Breath sounds: Normal breath sounds. No wheezing.   Abdominal:      Palpations: Abdomen is soft.   Skin:     General: Skin is warm.   Neurological:      General: No focal deficit present.      Mental Status: He is alert. Mental status is at baseline.   Psychiatric:         Mood and Affect: Mood normal.         Behavior: Behavior normal.               Significant Labs: All pertinent labs within the past 24 hours have been reviewed.    Significant Imaging: I have reviewed all pertinent imaging results/findings within the past 24 hours.      Assessment & Plan  Altered mental state (Resolved: 5/22/2025)  Resolved   -CT head unremarkable  -interrogate pacemaker  EEG was performed to assess for subclinical seizures   IMPRESSION:  This is an abnormal EEG during wakefulness, drowsiness and sleep. Rare left temporal focal mixed delta range slowing was noted    Consult neurology > recommended outpatient MRI work up and EEG  No hypercapnia on ABG  Infectious w/u in progress > COVID positive    Combined systolic and diastolic congestive heart failure, NYHA class 3  Patient has Combined Systolic and Diastolic heart failure that is Chronic. On presentation their CHF was well compensated. Most recent BNP and echo results are listed below.  No results for input(s): "BNP" in the last 72 hours.    Latest ECHO  Results for orders placed during the hospital encounter of 01/05/25    Echo    Interpretation Summary    Left Ventricle: The left ventricle is normal in size. There is " concentric hypertrophy. Regional wall motion abnormalities present. See diagram for wall motion findings. There is reduced systolic function. Quantitated ejection fraction is 36%. Unable to assess diastolic function due to poor image quality.    Right Ventricle: Normal right ventricular cavity size. Systolic function is normal. Pacemaker lead present in the ventricle.    Aortic Valve: There is aortic valve sclerosis. Mildly restricted motion. There is mild aortic regurgitation.    Mitral Valve: There is mild regurgitation.    Pulmonary Artery: The estimated pulmonary artery systolic pressure is 19 mmHg.    IVC/SVC: Normal venous pressure at 3 mmHg.    Current Heart Failure Medications  sacubitriL-valsartan  mg per tablet 1 tablet, 2 times daily, Oral  spironolactone tablet 25 mg, Daily, Oral  , Daily, Oral  , Daily, Oral  metoprolol succinate (TOPROL-XL) 24 hr tablet 50 mg, Daily, Oral  furosemide tablet 40 mg, Daily, Oral    Plan  - Monitor strict I&Os and daily weights.    - Place on telemetry  - Low sodium diet  - Place on fluid restriction of 2 L.   - Cardiology has been consulted  - The patient's volume status is at their baseline  -Cardio evaluated the patient , recommended device interrogation  -continue Lasix 40 mg p.o.  -decreasing metoprolol to 50 mg due to bradycardia      Acidosis, metabolic  Mild       AICD (automatic cardioverter/defibrillator) present  Cards recs:  patient has a Medtronic single-chamber AICD.    Device interrogation was done, I spoke with rep Mendes, the device is functioning well, no recorded events and patient is pacing <1%       S/P right coronary artery (RCA) stent placement  Chonic, stable    Stroke  Hx of  Continue chronic home meds  Fever  Negative UA/ CXR upon admission    Mostly related to COVID infection    Obesity (BMI 30.0-34.9)      COVID-19  Patient is identified as Severe COVID-19 based on hypoxemia with O2 saturations <94% on room air or on ambulation   Initiate  standard COVID protocols; COVID-19 testing ,Infection Control notification  and isolation- respiratory, contact and droplet per protocol    Diagnostics: Portable CXR    Management: Initiate targeted therapy with Remdesivir, 200mg IV x1, followed by 100mg IV daily x5 days total, Maintain oxygen saturations 92-96% via Nasal Cannula 3 LPM and monitor with continuous/intermittent pulse oximetry. , Inhaled bronchodilators as needed for shortness of breath., and Continuous cardiac monitoring.    Advance Care Planning  Current advance care plan has not been discussed with patient/family/POA and patient currently wishes DNR (Do Not Resuscitate).   VTE Risk Mitigation (From admission, onward)           Ordered     enoxaparin injection 40 mg  Daily         05/17/25 0428     IP VTE HIGH RISK PATIENT  Once         05/17/25 0428     Place sequential compression device  Until discontinued         05/17/25 0428                    Discharge Planning   OBDULIA: 5/24/2025     Code Status: DNR   Medical Readiness for Discharge Date:   Discharge Plan A: Rehab                Please place Justification for DME        Goyo Youssef MD  Department of Hospital Medicine   [facility]

## 2025-05-22 NOTE — PLAN OF CARE
Problem: Occupational Therapy  Goal: Occupational Therapy Goal  Description: Goals to be met by: 06/19/2025     Patient will increase functional independence with ADLs by performing:    UE Dressing with Modified [place].  LE Dressing with Modified [place].  Grooming while standing with Modified [place].  Toileting from toilet with Modified [place] for hygiene and clothing management.   Step transfer with Modified [place]  Toilet transfer to toilet with Modified [place].    Outcome: Progressing   Nimesh Saini is a 79 y.o. male with a medical diagnosis of Altered mental state.  Performance deficits affecting function are weakness, impaired endurance, impaired self care skills, impaired functional mobility, gait instability, impaired balance, decreased lower extremity function, decreased safety awareness, impaired coordination, impaired cardiopulmonary response to activity.    Pt found in bed, agreeable to therapy.  Pt is progressing towards goals. Rec High Intensity Therapy Post Acute Care  at time of discharge to maximize [place] and safety with ADL's and functional mobility. Continue OT services to address functional goals, progressing as able.

## 2025-05-22 NOTE — ASSESSMENT & PLAN NOTE
Cards recs:  patient has a Medtronic single-chamber AICD.    Device interrogation was done, I spoke with rep Mendes, the device is functioning well, no recorded events and patient is pacing <1%

## 2025-05-23 PROCEDURE — 97110 THERAPEUTIC EXERCISES: CPT

## 2025-05-23 PROCEDURE — 97110 THERAPEUTIC EXERCISES: CPT | Mod: CO

## 2025-05-23 PROCEDURE — 97116 GAIT TRAINING THERAPY: CPT

## 2025-05-23 PROCEDURE — 63600175 PHARM REV CODE 636 W HCPCS: Mod: JZ,TB | Performed by: STUDENT IN AN ORGANIZED HEALTH CARE EDUCATION/TRAINING PROGRAM

## 2025-05-23 PROCEDURE — 94761 N-INVAS EAR/PLS OXIMETRY MLT: CPT

## 2025-05-23 PROCEDURE — 25000003 PHARM REV CODE 250: Performed by: INTERNAL MEDICINE

## 2025-05-23 PROCEDURE — 63600175 PHARM REV CODE 636 W HCPCS: Performed by: INTERNAL MEDICINE

## 2025-05-23 PROCEDURE — 27000207 HC ISOLATION

## 2025-05-23 PROCEDURE — 97530 THERAPEUTIC ACTIVITIES: CPT

## 2025-05-23 PROCEDURE — 99900035 HC TECH TIME PER 15 MIN (STAT)

## 2025-05-23 PROCEDURE — 97530 THERAPEUTIC ACTIVITIES: CPT | Mod: CO

## 2025-05-23 PROCEDURE — 25000003 PHARM REV CODE 250: Performed by: STUDENT IN AN ORGANIZED HEALTH CARE EDUCATION/TRAINING PROGRAM

## 2025-05-23 PROCEDURE — 27000221 HC OXYGEN, UP TO 24 HOURS

## 2025-05-23 PROCEDURE — 21400001 HC TELEMETRY ROOM

## 2025-05-23 PROCEDURE — 25000003 PHARM REV CODE 250: Performed by: FAMILY MEDICINE

## 2025-05-23 RX ADMIN — PANTOPRAZOLE SODIUM 40 MG: 40 TABLET, DELAYED RELEASE ORAL at 09:05

## 2025-05-23 RX ADMIN — REMDESIVIR 100 MG: 100 INJECTION, POWDER, LYOPHILIZED, FOR SOLUTION INTRAVENOUS at 10:05

## 2025-05-23 RX ADMIN — SACUBITRIL AND VALSARTAN 1 TABLET: 97; 103 TABLET, FILM COATED ORAL at 09:05

## 2025-05-23 RX ADMIN — METOPROLOL SUCCINATE 50 MG: 50 TABLET, EXTENDED RELEASE ORAL at 09:05

## 2025-05-23 RX ADMIN — FUROSEMIDE 40 MG: 40 TABLET ORAL at 09:05

## 2025-05-23 RX ADMIN — GUAIFENESIN 200 MG: 100 SOLUTION ORAL at 08:05

## 2025-05-23 RX ADMIN — ENOXAPARIN SODIUM 40 MG: 40 INJECTION SUBCUTANEOUS at 05:05

## 2025-05-23 RX ADMIN — GUAIFENESIN 200 MG: 100 SOLUTION ORAL at 05:05

## 2025-05-23 RX ADMIN — CLOPIDOGREL BISULFATE 75 MG: 75 TABLET, FILM COATED ORAL at 09:05

## 2025-05-23 RX ADMIN — ASPIRIN 81 MG: 81 TABLET, COATED ORAL at 09:05

## 2025-05-23 RX ADMIN — SACUBITRIL AND VALSARTAN 1 TABLET: 97; 103 TABLET, FILM COATED ORAL at 08:05

## 2025-05-23 RX ADMIN — BENZONATATE 100 MG: 100 CAPSULE ORAL at 06:05

## 2025-05-23 RX ADMIN — SPIRONOLACTONE 25 MG: 25 TABLET, FILM COATED ORAL at 09:05

## 2025-05-23 RX ADMIN — CYANOCOBALAMIN TAB 1000 MCG 1000 MCG: 1000 TAB at 09:05

## 2025-05-23 RX ADMIN — BENZONATATE 100 MG: 100 CAPSULE ORAL at 10:05

## 2025-05-23 RX ADMIN — BENZONATATE 100 MG: 100 CAPSULE ORAL at 05:05

## 2025-05-23 RX ADMIN — MELATONIN TAB 3 MG 6 MG: 3 TAB at 08:05

## 2025-05-23 RX ADMIN — GUAIFENESIN 200 MG: 100 SOLUTION ORAL at 06:05

## 2025-05-23 NOTE — PLAN OF CARE
05/23/25 1105   Rounds   Attendance Provider;Nurse    Discharge Plan A Rehab  ([facility])   Why the patient remains in the hospital Requires continued medical care   Transition of Care Barriers Transportation       1105  Patient awake & alert sitting in the recliner when ROQUE participated in SIBR with Dr Davenport & nurse Milagros.     Pt was admitted with AMS and (+) covid 5/19/2025 & continues to be followed by PT/OT. Pox 94% on RA this AM.     MD informed the pt that he will be medically stable to dc to [facility] tomorrow after his final dose of remdesivir. Pt verbalized understanding.       Will continue to follow.

## 2025-05-23 NOTE — PLAN OF CARE
Problem: Occupational Therapy  Goal: Occupational Therapy Goal  Description: Goals to be met by: 06/19/2025     Patient will increase functional independence with ADLs by performing:    UE Dressing with Modified [place].  LE Dressing with Modified [place].  Grooming while standing with Modified [place].  Toileting from toilet with Modified [place] for hygiene and clothing management.   Step transfer with Modified [place]  Toilet transfer to toilet with Modified [place].    Outcome: Progressing   Nimesh Saini is a 79 y.o. male with a medical diagnosis of Altered mental state.  Performance deficits affecting function are weakness, impaired endurance, impaired self care skills, impaired functional mobility, gait instability, impaired balance, decreased upper extremity function, decreased lower extremity function, decreased safety awareness, pain, impaired coordination.    Pt found in bed, agreeable to therapy. Pt continues with decreased overall strength and endurance. He easily fatigues with minimal exertion. Rec High Intensity Therapy Post Acute Care  at time of discharge to maximize [place] and safety with ADL's and functional mobility. Pt was independent and living alone prior to admit. Continue OT services to address functional goals, progressing as able.

## 2025-05-23 NOTE — PLAN OF CARE
Assumed care o pt from ROSY Sahni. Pt lying on right side with HOB elevated to 15o. No report of pain. Pt somewhat restless. Pt up to chair 2x with 1x assistance. Plan of care reviewed. Medications administered per MAR. Safety maintained.     Problem: Adult Inpatient Plan of Care  Goal: Plan of Care Review  Outcome: Progressing  Goal: Optimal Comfort and Wellbeing  Outcome: Progressing  Goal: Readiness for Transition of Care  Outcome: Progressing     Problem: Fall Injury Risk  Goal: Absence of Fall and Fall-Related Injury  Outcome: Progressing     Problem: Fluid Volume Excess  Goal: Fluid Balance  Outcome: Progressing     Problem: Skin Injury Risk Increased  Goal: Skin Health and Integrity  Outcome: Progressing     Problem: Thought Process Alteration  Goal: Optimal Thought Clarity  Outcome: Progressing

## 2025-05-23 NOTE — PROGRESS NOTES
[facility]  Progress Note    Patient Name: Nimesh Saini  MRN: [id number]  Patient Class: IP- Inpatient   Admission Date: 2025  Length of Stay: 5 days  Attending Physician: Goyo Youssef MD  Primary Care Provider: No primary care provider on file.      Subjective     Principal Problem:Altered mental state        HPI:  79-year-old male with extensive medical history including ischemic cardiomyopathy with an EF of less than 15% and multivessel CAD, presents brought in by EMS for altered mental status.  Patient drove to a gas station this evening, and after several hours of not leaving was noted to be lying on the ground next their car by gas station employees.  He had been on the way to a .     He states he was driving and trying to find a [employer] and it was like he lost control of everything. He  last remembers looking for the [employer].  No bladder or bowel incontinence. No tongue biting.     Mild leg swelling that is new. He has been eating a lot of crawfish and crabs.       Overview/Hospital Course:  No notes on file    Interval History:     I have seen and examined the patient today  Patient is sitting at bedside, he is off oxygen since yesterday and saturating well on RA.      Review of Systems   Constitutional: Negative.    HENT: Negative.     Respiratory:  Positive for cough.    Cardiovascular: Negative.    Gastrointestinal: Negative.    Genitourinary: Negative.    Musculoskeletal: Negative.    Skin: Negative.    Neurological: Negative.    Psychiatric/Behavioral: Negative.       Objective:     Vital Signs (Most Recent):  Temp: 97.9 °F (36.6 °C) (25 1503)  Pulse: 61 (25 1503)  Resp: 20 (25 1503)  BP: (!) 99/58 (25 1503)  SpO2: 95 % (25 1503) Vital Signs (24h Range):  Temp:  [97.3 °F (36.3 °C)-98 °F (36.7 °C)] 97.9 °F (36.6 °C)  Pulse:  [39-69] 61  Resp:  [18-20] 20  SpO2:  [92 %-95 %] 95 %  BP: ()/(53-69) 99/58     Weight: 117 kg (257 lb  "15 oz)  Body mass index is 34.03 kg/m².    Intake/Output Summary (Last 24 hours) at 5/23/2025 1603  Last data filed at 5/23/2025 0442  Gross per 24 hour   Intake 245.06 ml   Output 225 ml   Net 20.06 ml         Physical Exam  Constitutional:       Appearance: Normal appearance. He is obese.   HENT:      Head: Normocephalic and atraumatic.   Cardiovascular:      Rate and Rhythm: Normal rate.   Pulmonary:      Effort: Pulmonary effort is normal.      Breath sounds: Normal breath sounds. No wheezing.   Abdominal:      Palpations: Abdomen is soft.   Skin:     General: Skin is warm.   Neurological:      General: No focal deficit present.      Mental Status: He is alert. Mental status is at baseline.   Psychiatric:         Mood and Affect: Mood normal.         Behavior: Behavior normal.               Significant Labs: All pertinent labs within the past 24 hours have been reviewed.    Significant Imaging: I have reviewed all pertinent imaging results/findings within the past 24 hours.      Assessment & Plan  Combined systolic and diastolic congestive heart failure, NYHA class 3  Patient has Combined Systolic and Diastolic heart failure that is Chronic. On presentation their CHF was well compensated. Most recent BNP and echo results are listed below.  No results for input(s): "BNP" in the last 72 hours.    Latest ECHO  Results for orders placed during the hospital encounter of 01/05/25    Echo    Interpretation Summary    Left Ventricle: The left ventricle is normal in size. There is concentric hypertrophy. Regional wall motion abnormalities present. See diagram for wall motion findings. There is reduced systolic function. Quantitated ejection fraction is 36%. Unable to assess diastolic function due to poor image quality.    Right Ventricle: Normal right ventricular cavity size. Systolic function is normal. Pacemaker lead present in the ventricle.    Aortic Valve: There is aortic valve sclerosis. Mildly restricted motion. " There is mild aortic regurgitation.    Mitral Valve: There is mild regurgitation.    Pulmonary Artery: The estimated pulmonary artery systolic pressure is 19 mmHg.    IVC/SVC: Normal venous pressure at 3 mmHg.    Current Heart Failure Medications  sacubitriL-valsartan  mg per tablet 1 tablet, 2 times daily, Oral  spironolactone tablet 25 mg, Daily, Oral  , Daily, Oral  , Daily, Oral  metoprolol succinate (TOPROL-XL) 24 hr tablet 50 mg, Daily, Oral  furosemide tablet 40 mg, Daily, Oral    Plan  - Monitor strict I&Os and daily weights.    - Place on telemetry  - Low sodium diet  - Place on fluid restriction of 2 L.   - Cardiology has been consulted  - The patient's volume status is at their baseline  -Cardio evaluated the patient , recommended device interrogation  -continue Lasix 40 mg p.o.  -decreasing metoprolol to 50 mg due to bradycardia      Acidosis, metabolic  Mild       AICD (automatic cardioverter/defibrillator) present  Cards recs:  patient has a Medtronic single-chamber AICD.    Device interrogation was done, I spoke with rep Mendes, the device is functioning well, no recorded events and patient is pacing <1%       S/P right coronary artery (RCA) stent placement  Chonic, stable    Stroke  Hx of  Continue chronic home meds  Fever  Negative UA/ CXR upon admission    Mostly related to COVID infection    Obesity (BMI 30.0-34.9)      COVID-19  Patient is identified as Severe COVID-19 based on hypoxemia with O2 saturations <94% on room air or on ambulation   Initiate standard COVID protocols; COVID-19 testing ,Infection Control notification  and isolation- respiratory, contact and droplet per protocol    Diagnostics: Portable CXR    Management: Initiate targeted therapy with Remdesivir, 200mg IV x1, followed by 100mg IV daily x5 days total, Maintain oxygen saturations 92-96% via Nasal Cannula 3 LPM and monitor with continuous/intermittent pulse oximetry. , Inhaled bronchodilators as needed for shortness of  breath., and Continuous cardiac monitoring.    Advance Care Planning  Current advance care plan has not been discussed with patient/family/POA and patient currently wishes DNR (Do Not Resuscitate).   VTE Risk Mitigation (From admission, onward)           Ordered     enoxaparin injection 40 mg  Daily         05/17/25 0428     IP VTE HIGH RISK PATIENT  Once         05/17/25 0428     Place sequential compression device  Until discontinued         05/17/25 0428                    Discharge Planning   OBDULIA: 5/24/2025     Code Status: DNR   Medical Readiness for Discharge Date:   Discharge Plan A: Rehab ([facility])                Please place Justification for DME        Goyo Youssef MD  Department of Hospital Medicine   [facility]

## 2025-05-23 NOTE — PT/OT/SLP PROGRESS
Occupational Therapy   Treatment    Name: Nimesh Saini  MRN: [id number]  Admitting Diagnosis:  Altered mental state       Recommendations:     Discharge Recommendations: High Intensity Therapy  Discharge Equipment Recommendations:  to be determined by next level of care  Barriers to discharge:  Decreased caregiver support, Other (Comment) (Increased level of assistance)    Assessment:     Nimesh Saini is a 79 y.o. male with a medical diagnosis of Altered mental state.  Performance deficits affecting function are weakness, impaired endurance, impaired self care skills, impaired functional mobility, gait instability, impaired balance, decreased upper extremity function, decreased lower extremity function, decreased safety awareness, pain, impaired coordination.    Pt found in bed, agreeable to therapy. Pt continues with decreased overall strength and endurance. He easily fatigues with minimal exertion. Rec High Intensity Therapy Post Acute Care  at time of discharge to maximize [place] and safety with ADL's and functional mobility. Pt was independent and living alone prior to admit. Continue OT services to address functional goals, progressing as able.     Rehab Prognosis:  Good; patient would benefit from acute skilled OT services to address these deficits and reach maximum level of function.       Plan:     Patient to be seen 5 x/week to address the above listed problems via self-care/home management, therapeutic activities, therapeutic exercises  Plan of Care Expires: 06/19/25  Plan of Care Reviewed with: patient    Subjective     Chief Complaint: tired   Patient/Family Comments/goals: to go to rehab   Pain/Comfort:  Pain Rating 1: 0/10  Pain Rating Post-Intervention 1: 0/10    Objective:     Communicated with: nurse prior to session.  Patient found up in chair with chair check, peripheral IV, telemetry upon OT entry to room.    General Precautions: Standard, fall    Orthopedic Precautions:N/A  Braces:  N/A  Respiratory Status: Room air 90% during activity       Occupational Performance:     Bed Mobility:    Patient completed Sit to Supine with minimum assistance BLE assist     Functional Mobility/Transfers:  Patient completed Sit <> Stand Transfer with contact guard assistance progressing to stand by assistance  with  rolling walker x 5 trials with vc's to scoot to edge of chair, BLE position and hand placement when sitting<>standing.  Patient completed Bed <> Chair Transfer using Stand Pivot technique with stand by assistance and contact guard assistance with rolling walker  Functional Mobility: Pt ambulated room distances with CGA-SBA using RW.  Pt with improved safety and technique with RW. Slow pace.  Continues to require cueing for upright posture.     [facility] 6 Click ADL: 17    Treatment & Education:  Pt performed sit<>stand, with 3 marches in place x 3 trials with SBA and RW for balance, strength, endurance training.   Pt performed BUE AROM x 15 reps all shld planes.    O2 sats 90% during activity       Patient left HOB elevated with all lines intact, call button in reach, and bed alarm on    GOALS:   Multidisciplinary Problems       Occupational Therapy Goals          Problem: Occupational Therapy    Goal Priority Disciplines Outcome Interventions   Occupational Therapy Goal     OT, PT/OT Progressing    Description: Goals to be met by: 06/19/2025     Patient will increase functional independence with ADLs by performing:    UE Dressing with Modified [place].  LE Dressing with Modified [place].  Grooming while standing with Modified [place].  Toileting from toilet with Modified [place] for hygiene and clothing management.   Step transfer with Modified [place]  Toilet transfer to toilet with Modified [place].                         Time Tracking:     OT Date of Treatment: 05/23/25  OT Start Time: 1310  OT Stop Time: 1338  OT Total Time (min): 28 min    Billable Minutes:Therapeutic  Activity 13  Therapeutic Exercise 15            5/23/2025

## 2025-05-23 NOTE — NURSING
Metoprolol 50mg given  during med pass. Clarification given by Dr. Nance, that metoprolol has decreased in dosage but we will still continue to give.

## 2025-05-23 NOTE — PT/OT/SLP PROGRESS
Physical Therapy Treatment    Patient Name:  Nimesh Saini   MRN:  [id number]    Recommendations:     Discharge Recommendations: High Intensity Therapy  Discharge Equipment Recommendations: to be determined by next level of care  Barriers to discharge: impaired balance, fall risk, decreased endurance, impaired functional mobility     Assessment:     Nimesh Saini is a 79 y.o. male admitted with a medical diagnosis of Altered mental state.  He presents with the following impairments/functional limitations: weakness, impaired endurance, impaired self care skills, impaired functional mobility, gait instability, decreased safety awareness. Pt required stand by assistance for bed mobility and CGA for sit to stand transfer. Pt required Min A for low surface <> stand transfer. Pt required Min A for verbal cues to increase ABHI, awareness of environmental barriers, and increasing step length during ambulation with RW. Pt required Min A for bed to chair for verbal cues for safety awareness. Pt maintained 92-95% oxygen saturation during the entirety of session.     Rehab Prognosis: Fair based on prior level of function; patient would benefit from acute skilled PT services to address these deficits and reach maximum level of function.    Recent Surgery: * No surgery found *      Plan:     During this hospitalization, patient to be seen 5 x/week to address the identified rehab impairments via gait training, therapeutic activities, therapeutic exercises, neuromuscular re-education and progress toward the following goals:    Plan of Care Expires:  06/19/25    Subjective     Chief Complaint: Fatigue   Patient/Family Comments/goals: Desire to go home   Pain/Comfort:  Pain Rating 1: 0/10  Pain Rating Post-Intervention 1: 0/10  Pain Rating 2: 0/10  Pain Rating Post-Intervention 2: 0/10      Objective:     Communicated with nsg prior to session.  Patient found asleep in right sidelying with peripheral IV, telemetry upon PT entry to room.      General Precautions: Standard, fall  Orthopedic Precautions: N/A  Braces: N/A  Respiratory Status: Room air     Functional Mobility:  Bed Mobility:     Scooting: stand by assistance  Supine to Sit: stand by assistance  Transfers:     Sit to Stand:  contact guard assistance with rolling walker  Bed to Chair: minimum assistance with  rolling walker  using  Step Transfer  Toilet Transfer: minimum assistance with  rolling walker  using  Step Transfer  Gait: Pt ambulate 5 ft x 2 to toilet with Min A for awareness of environment and remaining in rolling walker ABHI. Pt ambulated 20 ft x 2 using RW with Ya for verbal cues for increasing ABHI, surrounding walls/environment, and increasing step length. Pt maintained 92-95% oxygen saturation on room air during ambulation.       AM-PAC 6 CLICK MOBILITY  Turning over in bed (including adjusting bedclothes, sheets and blankets)?: 3  Sitting down on and standing up from a chair with arms (e.g., wheelchair, bedside commode, etc.): 3  Moving from lying on back to sitting on the side of the bed?: 3  Moving to and from a bed to a chair (including a wheelchair)?: 3  Need to walk in hospital room?: 3  Climbing 3-5 steps with a railing?: 2  Basic Mobility Total Score: 17       Treatment & Education:  Pt educated on proper sit to stand transfer technique for hand placement in order to increase safety.   Pt educated on benefits of sitting upright in chair to improve functional mobility.   Pt completed 10 reps of hip flexion and 10 reps of LAQs at EOB.   Pt educated on PLB with fair carryover due to increase work of breathing during activity.     Patient left upright in chair at bedside with chair alarm on with all lines intact..    GOALS:   Multidisciplinary Problems       Physical Therapy Goals          Problem: Physical Therapy    Goal Priority Disciplines Outcome Interventions   Physical Therapy Goal     PT, PT/OT Progressing    Description: Goals to be met by: 6/19/25     Patient  will increase functional independence with mobility by performin. Supine to sit with Stand-by Assistance  2. Sit to supine with Stand-by Assistance  3. Sit to stand transfer with Stand-by Assistance  4. Bed to chair transfer with Stand-by Assistance using Rolling Walker  5. Gait  x 50 feet with Stand-by Assistance using Rolling Walker.   6. Ascend/descend 1 flight of stairs with 1-2 Handrails Contact Guard Assistance using No Assistive Device.                              Time Tracking:     PT Received On: 25  PT Start Time: 1014     PT Stop Time: 1051  PT Total Time (min): 37 min     Billable Minutes: Gait Training 17, Therapeutic Activity 10, and Therapeutic Exercise 10    Treatment Type: Treatment  PT/PTA: PT     Number of PTA visits since last PT visit: 0     2025

## 2025-05-23 NOTE — SUBJECTIVE & OBJECTIVE
Interval History:     I have seen and examined the patient today  Patient is sitting at bedside, he is off oxygen since yesterday and saturating well on RA.      Review of Systems   Constitutional: Negative.    HENT: Negative.     Respiratory:  Positive for cough.    Cardiovascular: Negative.    Gastrointestinal: Negative.    Genitourinary: Negative.    Musculoskeletal: Negative.    Skin: Negative.    Neurological: Negative.    Psychiatric/Behavioral: Negative.       Objective:     Vital Signs (Most Recent):  Temp: 97.9 °F (36.6 °C) (05/23/25 1503)  Pulse: 61 (05/23/25 1503)  Resp: 20 (05/23/25 1503)  BP: (!) 99/58 (05/23/25 1503)  SpO2: 95 % (05/23/25 1503) Vital Signs (24h Range):  Temp:  [97.3 °F (36.3 °C)-98 °F (36.7 °C)] 97.9 °F (36.6 °C)  Pulse:  [39-69] 61  Resp:  [18-20] 20  SpO2:  [92 %-95 %] 95 %  BP: ()/(53-69) 99/58     Weight: 117 kg (257 lb 15 oz)  Body mass index is 34.03 kg/m².    Intake/Output Summary (Last 24 hours) at 5/23/2025 1603  Last data filed at 5/23/2025 0442  Gross per 24 hour   Intake 245.06 ml   Output 225 ml   Net 20.06 ml         Physical Exam  Constitutional:       Appearance: Normal appearance. He is obese.   HENT:      Head: Normocephalic and atraumatic.   Cardiovascular:      Rate and Rhythm: Normal rate.   Pulmonary:      Effort: Pulmonary effort is normal.      Breath sounds: Normal breath sounds. No wheezing.   Abdominal:      Palpations: Abdomen is soft.   Skin:     General: Skin is warm.   Neurological:      General: No focal deficit present.      Mental Status: He is alert. Mental status is at baseline.   Psychiatric:         Mood and Affect: Mood normal.         Behavior: Behavior normal.               Significant Labs: All pertinent labs within the past 24 hours have been reviewed.    Significant Imaging: I have reviewed all pertinent imaging results/findings within the past 24 hours.

## 2025-05-24 LAB
BACTERIA BLD CULT: NORMAL
BACTERIA BLD CULT: NORMAL

## 2025-05-24 PROCEDURE — 25000003 PHARM REV CODE 250: Performed by: STUDENT IN AN ORGANIZED HEALTH CARE EDUCATION/TRAINING PROGRAM

## 2025-05-24 PROCEDURE — 25000003 PHARM REV CODE 250: Performed by: INTERNAL MEDICINE

## 2025-05-24 PROCEDURE — 21400001 HC TELEMETRY ROOM

## 2025-05-24 PROCEDURE — 99900035 HC TECH TIME PER 15 MIN (STAT)

## 2025-05-24 PROCEDURE — 63600175 PHARM REV CODE 636 W HCPCS: Mod: JZ,TB | Performed by: STUDENT IN AN ORGANIZED HEALTH CARE EDUCATION/TRAINING PROGRAM

## 2025-05-24 PROCEDURE — 94761 N-INVAS EAR/PLS OXIMETRY MLT: CPT

## 2025-05-24 PROCEDURE — 63600175 PHARM REV CODE 636 W HCPCS: Performed by: INTERNAL MEDICINE

## 2025-05-24 PROCEDURE — 27000207 HC ISOLATION

## 2025-05-24 RX ORDER — METOPROLOL SUCCINATE 25 MG/1
25 TABLET, EXTENDED RELEASE ORAL DAILY
Status: DISCONTINUED | OUTPATIENT
Start: 2025-05-24 | End: 2025-05-25 | Stop reason: HOSPADM

## 2025-05-24 RX ADMIN — MELATONIN TAB 3 MG 6 MG: 3 TAB at 08:05

## 2025-05-24 RX ADMIN — GUAIFENESIN 200 MG: 100 SOLUTION ORAL at 11:05

## 2025-05-24 RX ADMIN — REMDESIVIR 100 MG: 100 INJECTION, POWDER, LYOPHILIZED, FOR SOLUTION INTRAVENOUS at 09:05

## 2025-05-24 RX ADMIN — PANTOPRAZOLE SODIUM 40 MG: 40 TABLET, DELAYED RELEASE ORAL at 08:05

## 2025-05-24 RX ADMIN — SACUBITRIL AND VALSARTAN 1 TABLET: 97; 103 TABLET, FILM COATED ORAL at 08:05

## 2025-05-24 RX ADMIN — SPIRONOLACTONE 25 MG: 25 TABLET, FILM COATED ORAL at 08:05

## 2025-05-24 RX ADMIN — ENOXAPARIN SODIUM 40 MG: 40 INJECTION SUBCUTANEOUS at 05:05

## 2025-05-24 RX ADMIN — BENZONATATE 100 MG: 100 CAPSULE ORAL at 11:05

## 2025-05-24 RX ADMIN — CLOPIDOGREL BISULFATE 75 MG: 75 TABLET, FILM COATED ORAL at 08:05

## 2025-05-24 RX ADMIN — GUAIFENESIN 200 MG: 100 SOLUTION ORAL at 08:05

## 2025-05-24 RX ADMIN — CYANOCOBALAMIN TAB 1000 MCG 1000 MCG: 1000 TAB at 08:05

## 2025-05-24 RX ADMIN — GUAIFENESIN 200 MG: 100 SOLUTION ORAL at 01:05

## 2025-05-24 RX ADMIN — BENZONATATE 100 MG: 100 CAPSULE ORAL at 08:05

## 2025-05-24 RX ADMIN — FUROSEMIDE 40 MG: 40 TABLET ORAL at 08:05

## 2025-05-24 RX ADMIN — METOPROLOL SUCCINATE 25 MG: 50 TABLET, EXTENDED RELEASE ORAL at 08:05

## 2025-05-24 NOTE — ASSESSMENT & PLAN NOTE
"Patient has Combined Systolic and Diastolic heart failure that is Chronic. On presentation their CHF was well compensated. Most recent BNP and echo results are listed below.  No results for input(s): "BNP" in the last 72 hours.    Latest ECHO  Results for orders placed during the hospital encounter of 01/05/25    Echo    Interpretation Summary    Left Ventricle: The left ventricle is normal in size. There is concentric hypertrophy. Regional wall motion abnormalities present. See diagram for wall motion findings. There is reduced systolic function. Quantitated ejection fraction is 36%. Unable to assess diastolic function due to poor image quality.    Right Ventricle: Normal right ventricular cavity size. Systolic function is normal. Pacemaker lead present in the ventricle.    Aortic Valve: There is aortic valve sclerosis. Mildly restricted motion. There is mild aortic regurgitation.    Mitral Valve: There is mild regurgitation.    Pulmonary Artery: The estimated pulmonary artery systolic pressure is 19 mmHg.    IVC/SVC: Normal venous pressure at 3 mmHg.    Current Heart Failure Medications  sacubitriL-valsartan  mg per tablet 1 tablet, 2 times daily, Oral  spironolactone tablet 25 mg, Daily, Oral  , Daily, Oral  , Daily, Oral  furosemide tablet 40 mg, Daily, Oral  metoprolol succinate (TOPROL-XL) 24 hr tablet 25 mg, Daily, Oral    Plan  - Monitor strict I&Os and daily weights.    - Place on telemetry  - Low sodium diet  - Place on fluid restriction of 2 L.   - Cardiology has been consulted  - The patient's volume status is at their baseline  -Cardio evaluated the patient , recommended device interrogation  -continue Lasix 40 mg p.o.  -decreasing metoprolol to 25 mg due to bradycardia      "

## 2025-05-24 NOTE — PLAN OF CARE
Problem: Adult Inpatient Plan of Care  Goal: Plan of Care Review  Outcome: Not Progressing  Goal: Absence of Hospital-Acquired Illness or Injury  Outcome: Not Progressing  Goal: Optimal Comfort and Wellbeing  Outcome: Not Progressing     Problem: Fall Injury Risk  Goal: Absence of Fall and Fall-Related Injury  Outcome: Not Progressing     Problem: Comorbidity Management  Goal: Maintenance of Heart Failure Symptom Control  Outcome: Not Progressing  Goal: Blood Pressure in Desired Range  Outcome: Not Progressing     Problem: Electrolyte Imbalance  Goal: Electrolyte Balance  Outcome: Not Progressing

## 2025-05-24 NOTE — PROGRESS NOTES
[facility]  Progress Note    Patient Name: Nimesh Saini  MRN: [id number]  Patient Class: IP- Inpatient   Admission Date: 2025  Length of Stay: 6 days  Attending Physician: Goyo Youssef MD  Primary Care Provider: No primary care provider on file.        Subjective     Principal Problem:Altered mental state        HPI:  79-year-old male with extensive medical history including ischemic cardiomyopathy with an EF of less than 15% and multivessel CAD, presents brought in by EMS for altered mental status.  Patient drove to a gas station this evening, and after several hours of not leaving was noted to be lying on the ground next their car by gas station employees.  He had been on the way to a .     He states he was driving and trying to find a [employer] and it was like he lost control of everything. He  last remembers looking for the Hinduism.  No bladder or bowel incontinence. No tongue biting.     Mild leg swelling that is new. He has been eating a lot of crawfish and crabs.       Overview/Hospital Course:  No notes on file    Interval History:     I have seen and examined the patient today  Patient is sitting at bedside,feeling okay  He is waiting for bed availability at [facility]      Review of Systems   Constitutional: Negative.    HENT: Negative.     Cardiovascular: Negative.    Gastrointestinal: Negative.    Genitourinary: Negative.    Musculoskeletal: Negative.    Skin: Negative.    Neurological: Negative.    Psychiatric/Behavioral: Negative.       Objective:     Vital Signs (Most Recent):  Temp: 97.6 °F (36.4 °C) (25 1157)  Pulse: 61 (25 1205)  Resp: 18 (25 1157)  BP: (!) 120/59 (25 1157)  SpO2: (!) 94 % (25 1157) Vital Signs (24h Range):  Temp:  [97.6 °F (36.4 °C)-98.8 °F (37.1 °C)] 97.6 °F (36.4 °C)  Pulse:  [54-70] 61  Resp:  [18-20] 18  SpO2:  [92 %-95 %] 94 %  BP: ()/(52-78) 120/59     Weight: 117 kg (257 lb 15 oz)  Body mass  "index is 34.03 kg/m².    Intake/Output Summary (Last 24 hours) at 5/24/2025 1418  Last data filed at 5/24/2025 1135  Gross per 24 hour   Intake --   Output 825 ml   Net -825 ml         Physical Exam  Constitutional:       Appearance: Normal appearance. He is obese.   HENT:      Head: Normocephalic and atraumatic.   Cardiovascular:      Rate and Rhythm: Normal rate.   Pulmonary:      Effort: Pulmonary effort is normal.      Breath sounds: Normal breath sounds. No wheezing.   Abdominal:      Palpations: Abdomen is soft.   Skin:     General: Skin is warm.   Neurological:      General: No focal deficit present.      Mental Status: He is alert. Mental status is at baseline.   Psychiatric:         Mood and Affect: Mood normal.         Behavior: Behavior normal.               Significant Labs: All pertinent labs within the past 24 hours have been reviewed.    Significant Imaging: I have reviewed all pertinent imaging results/findings within the past 24 hours.      Assessment & Plan  Combined systolic and diastolic congestive heart failure, NYHA class 3  Patient has Combined Systolic and Diastolic heart failure that is Chronic. On presentation their CHF was well compensated. Most recent BNP and echo results are listed below.  No results for input(s): "BNP" in the last 72 hours.    Latest ECHO  Results for orders placed during the hospital encounter of 01/05/25    Echo    Interpretation Summary    Left Ventricle: The left ventricle is normal in size. There is concentric hypertrophy. Regional wall motion abnormalities present. See diagram for wall motion findings. There is reduced systolic function. Quantitated ejection fraction is 36%. Unable to assess diastolic function due to poor image quality.    Right Ventricle: Normal right ventricular cavity size. Systolic function is normal. Pacemaker lead present in the ventricle.    Aortic Valve: There is aortic valve sclerosis. Mildly restricted motion. There is mild aortic " regurgitation.    Mitral Valve: There is mild regurgitation.    Pulmonary Artery: The estimated pulmonary artery systolic pressure is 19 mmHg.    IVC/SVC: Normal venous pressure at 3 mmHg.    Current Heart Failure Medications  sacubitriL-valsartan  mg per tablet 1 tablet, 2 times daily, Oral  spironolactone tablet 25 mg, Daily, Oral  , Daily, Oral  , Daily, Oral  furosemide tablet 40 mg, Daily, Oral  metoprolol succinate (TOPROL-XL) 24 hr tablet 25 mg, Daily, Oral    Plan  - Monitor strict I&Os and daily weights.    - Place on telemetry  - Low sodium diet  - Place on fluid restriction of 2 L.   - Cardiology has been consulted  - The patient's volume status is at their baseline  -Cardio evaluated the patient , recommended device interrogation  -continue Lasix 40 mg p.o.  -decreasing metoprolol to 25 mg due to bradycardia      Acidosis, metabolic  Mild       AICD (automatic cardioverter/defibrillator) present  Cards recs:  patient has a [employer] single-chamber AICD.    Device interrogation was done, I spoke with rep Mendes, the device is functioning well, no recorded events and patient is pacing <1%       S/P right coronary artery (RCA) stent placement  Chonic, stable    Stroke  Hx of  Continue chronic home meds  Fever  Negative UA/ CXR upon admission    Mostly related to COVID infection    Resolved     Obesity (BMI 30.0-34.9)      COVID-19  Patient is identified as Severe COVID-19 based on hypoxemia with O2 saturations <94% on room air or on ambulation   Initiate standard COVID protocols; COVID-19 testing ,Infection Control notification  and isolation- respiratory, contact and droplet per protocol    Diagnostics: Portable CXR    Management: Initiate targeted therapy with Remdesivir, 200mg IV x1, followed by 100mg IV daily x5 days total, Maintain oxygen saturations 92-96% via Nasal Cannula 3 LPM and monitor with continuous/intermittent pulse oximetry. , Inhaled bronchodilators as needed for shortness of breath.,  and Continuous cardiac monitoring.    Advance Care Planning  Current advance care plan has not been discussed with patient/family/POA and patient currently wishes DNR (Do Not Resuscitate).   VTE Risk Mitigation (From admission, onward)           Ordered     enoxaparin injection 40 mg  Daily         05/17/25 0428     IP VTE HIGH RISK PATIENT  Once         05/17/25 0428     Place sequential compression device  Until discontinued         05/17/25 0428                    Discharge Planning   OBDULIA: 5/24/2025     Code Status: DNR   Medical Readiness for Discharge Date: 5/24/2025  Discharge Plan A: Rehab ([facility])                Please place Justification for DME        Goyo Youssef MD  Department of Hospital Medicine   [facility]

## 2025-05-24 NOTE — PLAN OF CARE
"0830  CM was informed by Laura w/[facility] HR  39 yesterday & questioned if his AICD is paced. Dr Greenwood informed of above.    0845  Message received from Dr Youssef stating that "we checked his AICD with the rep, he is pacing less than 1% which means he is doing well on his own".    DC order noted.     1045  Message sent to Laura questioning if the pt will be accepted for admission today. Awaiting response.     1115  CM was informed by Laura that she is checking bed availability.     1350  Message sent to Laura w/[facility] questioning the pt's admission status. Awaiting response.     1400  CM was informed by Laura that [facility] does not have bed availability today. CM informed the pt of above. Pt verbalized understanding.       Will continue to follow.   "

## 2025-05-24 NOTE — SUBJECTIVE & OBJECTIVE
Interval History:     I have seen and examined the patient today  Patient is sitting at bedside,feeling okay  He is waiting for bed availability at [facility]      Review of Systems   Constitutional: Negative.    HENT: Negative.     Cardiovascular: Negative.    Gastrointestinal: Negative.    Genitourinary: Negative.    Musculoskeletal: Negative.    Skin: Negative.    Neurological: Negative.    Psychiatric/Behavioral: Negative.       Objective:     Vital Signs (Most Recent):  Temp: 97.6 °F (36.4 °C) (05/24/25 1157)  Pulse: 61 (05/24/25 1205)  Resp: 18 (05/24/25 1157)  BP: (!) 120/59 (05/24/25 1157)  SpO2: (!) 94 % (05/24/25 1157) Vital Signs (24h Range):  Temp:  [97.6 °F (36.4 °C)-98.8 °F (37.1 °C)] 97.6 °F (36.4 °C)  Pulse:  [54-70] 61  Resp:  [18-20] 18  SpO2:  [92 %-95 %] 94 %  BP: ()/(52-78) 120/59     Weight: 117 kg (257 lb 15 oz)  Body mass index is 34.03 kg/m².    Intake/Output Summary (Last 24 hours) at 5/24/2025 1418  Last data filed at 5/24/2025 1135  Gross per 24 hour   Intake --   Output 825 ml   Net -825 ml         Physical Exam  Constitutional:       Appearance: Normal appearance. He is obese.   HENT:      Head: Normocephalic and atraumatic.   Cardiovascular:      Rate and Rhythm: Normal rate.   Pulmonary:      Effort: Pulmonary effort is normal.      Breath sounds: Normal breath sounds. No wheezing.   Abdominal:      Palpations: Abdomen is soft.   Skin:     General: Skin is warm.   Neurological:      General: No focal deficit present.      Mental Status: He is alert. Mental status is at baseline.   Psychiatric:         Mood and Affect: Mood normal.         Behavior: Behavior normal.               Significant Labs: All pertinent labs within the past 24 hours have been reviewed.    Significant Imaging: I have reviewed all pertinent imaging results/findings within the past 24 hours.

## 2025-05-25 VITALS
BODY MASS INDEX: 34.19 KG/M2 | SYSTOLIC BLOOD PRESSURE: 111 MMHG | HEIGHT: 73 IN | DIASTOLIC BLOOD PRESSURE: 68 MMHG | TEMPERATURE: 98 F | HEART RATE: 62 BPM | RESPIRATION RATE: 18 BRPM | WEIGHT: 257.94 LBS | OXYGEN SATURATION: 94 %

## 2025-05-25 PROBLEM — E87.20 ACIDOSIS, METABOLIC: Status: RESOLVED | Noted: 2025-05-17 | Resolved: 2025-05-25

## 2025-05-25 PROBLEM — R50.9 FEVER: Status: RESOLVED | Noted: 2025-05-18 | Resolved: 2025-05-25

## 2025-05-25 LAB
ABSOLUTE EOSINOPHIL (OHS): 0.16 K/UL
ABSOLUTE MONOCYTE (OHS): 0.67 K/UL (ref 0.3–1)
ABSOLUTE NEUTROPHIL COUNT (OHS): 2.75 K/UL (ref 1.8–7.7)
ANION GAP (OHS): 11 MMOL/L (ref 8–16)
BASOPHILS # BLD AUTO: 0.03 K/UL
BASOPHILS NFR BLD AUTO: 0.6 %
BUN SERPL-MCNC: 27 MG/DL (ref 8–23)
CALCIUM SERPL-MCNC: 9.1 MG/DL (ref 8.7–10.5)
CHLORIDE SERPL-SCNC: 108 MMOL/L (ref 95–110)
CO2 SERPL-SCNC: 23 MMOL/L (ref 23–29)
CREAT SERPL-MCNC: 1.2 MG/DL (ref 0.5–1.4)
ERYTHROCYTE [DISTWIDTH] IN BLOOD BY AUTOMATED COUNT: 12.7 % (ref 11.5–14.5)
GFR SERPLBLD CREATININE-BSD FMLA CKD-EPI: >60 ML/MIN/1.73/M2
GLUCOSE SERPL-MCNC: 97 MG/DL (ref 70–110)
HCT VFR BLD AUTO: 43.2 % (ref 40–54)
HGB BLD-MCNC: 15.3 GM/DL (ref 14–18)
IMM GRANULOCYTES # BLD AUTO: 0.05 K/UL (ref 0–0.04)
IMM GRANULOCYTES NFR BLD AUTO: 1.1 % (ref 0–0.5)
LYMPHOCYTES # BLD AUTO: 0.99 K/UL (ref 1–4.8)
MAGNESIUM SERPL-MCNC: 2 MG/DL (ref 1.6–2.6)
MCH RBC QN AUTO: 31.2 PG (ref 27–31)
MCHC RBC AUTO-ENTMCNC: 35.4 G/DL (ref 32–36)
MCV RBC AUTO: 88 FL (ref 82–98)
NUCLEATED RBC (/100WBC) (OHS): 0 /100 WBC
PLATELET # BLD AUTO: 163 K/UL (ref 150–450)
PLATELET BLD QL SMEAR: NORMAL
PMV BLD AUTO: 11.3 FL (ref 9.2–12.9)
POTASSIUM SERPL-SCNC: 3.6 MMOL/L (ref 3.5–5.1)
RBC # BLD AUTO: 4.9 M/UL (ref 4.6–6.2)
RELATIVE EOSINOPHIL (OHS): 3.4 %
RELATIVE LYMPHOCYTE (OHS): 21.3 % (ref 18–48)
RELATIVE MONOCYTE (OHS): 14.4 % (ref 4–15)
RELATIVE NEUTROPHIL (OHS): 59.2 % (ref 38–73)
SODIUM SERPL-SCNC: 142 MMOL/L (ref 136–145)
WBC # BLD AUTO: 4.65 K/UL (ref 3.9–12.7)

## 2025-05-25 PROCEDURE — 25000003 PHARM REV CODE 250: Performed by: FAMILY MEDICINE

## 2025-05-25 PROCEDURE — 25000003 PHARM REV CODE 250: Performed by: STUDENT IN AN ORGANIZED HEALTH CARE EDUCATION/TRAINING PROGRAM

## 2025-05-25 PROCEDURE — 85025 COMPLETE CBC W/AUTO DIFF WBC: CPT | Performed by: FAMILY MEDICINE

## 2025-05-25 PROCEDURE — 83735 ASSAY OF MAGNESIUM: CPT | Performed by: FAMILY MEDICINE

## 2025-05-25 PROCEDURE — 25000003 PHARM REV CODE 250: Performed by: INTERNAL MEDICINE

## 2025-05-25 PROCEDURE — 36415 COLL VENOUS BLD VENIPUNCTURE: CPT | Performed by: FAMILY MEDICINE

## 2025-05-25 PROCEDURE — 80048 BASIC METABOLIC PNL TOTAL CA: CPT | Performed by: FAMILY MEDICINE

## 2025-05-25 RX ORDER — MUPIROCIN 20 MG/G
OINTMENT TOPICAL 2 TIMES DAILY
Status: DISCONTINUED | OUTPATIENT
Start: 2025-05-25 | End: 2025-05-25 | Stop reason: HOSPADM

## 2025-05-25 RX ORDER — POTASSIUM CHLORIDE 20 MEQ/1
40 TABLET, EXTENDED RELEASE ORAL ONCE
Status: COMPLETED | OUTPATIENT
Start: 2025-05-25 | End: 2025-05-25

## 2025-05-25 RX ORDER — METOPROLOL SUCCINATE 25 MG/1
25 TABLET, EXTENDED RELEASE ORAL DAILY
Qty: 30 TABLET | Refills: 0 | Status: ON HOLD | OUTPATIENT
Start: 2025-05-25 | End: 2025-06-24

## 2025-05-25 RX ADMIN — POTASSIUM CHLORIDE 40 MEQ: 1500 TABLET, EXTENDED RELEASE ORAL at 04:05

## 2025-05-25 RX ADMIN — MUPIROCIN: 20 OINTMENT TOPICAL at 09:05

## 2025-05-25 RX ADMIN — PANTOPRAZOLE SODIUM 40 MG: 40 TABLET, DELAYED RELEASE ORAL at 09:05

## 2025-05-25 RX ADMIN — METOPROLOL SUCCINATE 25 MG: 50 TABLET, EXTENDED RELEASE ORAL at 09:05

## 2025-05-25 RX ADMIN — GUAIFENESIN 200 MG: 100 SOLUTION ORAL at 04:05

## 2025-05-25 RX ADMIN — CLOPIDOGREL BISULFATE 75 MG: 75 TABLET, FILM COATED ORAL at 09:05

## 2025-05-25 RX ADMIN — SACUBITRIL AND VALSARTAN 1 TABLET: 97; 103 TABLET, FILM COATED ORAL at 09:05

## 2025-05-25 RX ADMIN — CYANOCOBALAMIN TAB 1000 MCG 1000 MCG: 1000 TAB at 09:05

## 2025-05-25 RX ADMIN — GUAIFENESIN 200 MG: 100 SOLUTION ORAL at 09:05

## 2025-05-25 RX ADMIN — ASPIRIN 81 MG: 81 TABLET, COATED ORAL at 09:05

## 2025-05-25 RX ADMIN — FUROSEMIDE 40 MG: 40 TABLET ORAL at 09:05

## 2025-05-25 RX ADMIN — SPIRONOLACTONE 25 MG: 25 TABLET, FILM COATED ORAL at 09:05

## 2025-05-25 RX ADMIN — BENZONATATE 100 MG: 100 CAPSULE ORAL at 04:05

## 2025-05-25 NOTE — PLAN OF CARE
Case Management Final Discharge Note    Discharge Disposition: [facility]    New DME ordered / company name: None     Relevant SDOH / Transition of Care Barriers:  None     Person available to provide assistance at home when needed and their contact information: Extended Emergency Contact Information  Primary Emergency Contact: Lincoln Saini   [place]  Mobile Phone: [phone]  Relation: Son  Secondary Emergency Contact: Angel Saini  Mobile Phone: [phone]  Relation: Son   needed? No     Transportation: Transportation is scheduled to transport patient to [facility].     ADT 30 order placed for Van Transportation.  Requested  time: 1:00 pm  If transportation does not arrive at ETA time nurse will be instructed to follow protocol for transportation below:  How can I get in touch directly with dispatch, if needed?                 Non-emergent dispatch: [phone]      +++NURSING:  If Van does not arrive at requested time please call the above Non Emergent Dispatcher.  If issue not resolved please escalate to your charge nurse for further instructions.     Patient and family educated on discharge services and updated on DC plan. Bedside RN notified, patient clear to discharge from Case Management Perspective.     05/25/25 1211   Final Note   Assessment Type Final Discharge Note   Anticipated Discharge Disposition Rehab   What phone number can be called within the next 1-3 days to see how you are doing after discharge? [phone]   Hospital Resources/Appts/Education Provided Appointments scheduled and added to AVS   Post-Acute Status   Post-Acute Authorization Placement   Post-Acute Placement Status Set-up Complete/Auth obtained   Discharge Delays None known at this time

## 2025-05-25 NOTE — PLAN OF CARE
ROQUE spoke with Laura with [facility], to inquire about patient's discharge. Laura informed ROQUE that her MD is currently reviewing patient's clinicals. Laura will keep ROQUE updated if MD get back with her today.

## 2025-05-25 NOTE — NURSING
RAPID RESPONSE NURSE PROACTIVE ROUNDING NOTE       Time of Visit: 250    Admit Date: 2025  LOS: 7  Code Status: DNR   Date of Visit: 2025  : 1945  Age: 79 y.o.  Sex: male  Race: White  Bed: K526/K526 A:   MRN: [id number]  Was the patient discharged from an ICU this admission? No   Was the patient discharged from a PACU within last 24 hours? No   Did the patient receive conscious sedation/general anesthesia in last 24 hours? No   Was the patient in the ED within the past 24 hours? No   Was the patient on NIPPV within the past 24 hours? No   Attending Physician: Goyo Youssef MD  Primary Service: Hospitalist,Internal Medicine   Time spent at the bedside: < 15 min    SITUATION    Notified by Chart review  Reason for alert: Bradycardia noted on monitor    Diagnosis: Altered mental state   has a past medical history of Cardiomyopathy, CHF (congestive heart failure), Coronary artery disease, Diverticulitis large intestine, GERD (gastroesophageal reflux disease), Hypertension, and Obesity.    Last Vitals:  Temp: 97.9 °F (36.6 °C) (2036)  Pulse: 63 (323)  Resp: 18 (323)  BP: 99/68 (323)  SpO2: 91 % (323)    24 Hour Vitals Range:  Temp:  [97.6 °F (36.4 °C)-98.6 °F (37 °C)]   Pulse:  [51-70]   Resp:  [18-20]   BP: ()/(54-68)   SpO2:  [91 %-95 %]     Clinical Issues: Bradycardia    ASSESSMENT/INTERVENTIONS    Noted patient was bradycardic on monitor. No recent EKG noted in chart to compare. Per RN, patient becomes bradycardic at night. Last labs drawn on . Notified primary MD.     RECOMMENDATIONS    Recommend am labs. Check for hypokalemia.  Discussed plan of care with bedside RNLitzy RN    PROVIDER ESCALATION    Physician escalation: Yes    Orders received and case discussed with Dr. Steve Altamirano.    Disposition:Remain in room 526    FOLLOW UP    Call back the Rapid Response NurseCeci at [phone] for additional questions or concerns.

## 2025-05-25 NOTE — PLAN OF CARE
[facility]    FACILITY TRANSFER ORDERS      Patient Name: Nimesh Saini  YOB: 1945    PCP: No primary care provider on file.   PCP Address: No primary physician on file.  PCP Phone Number: None  PCP Fax: None    Encounter Date: 05/25/2025    Admit to: [facility]    Vital Signs:  Routine    Diagnoses:   Active Hospital Problems    Diagnosis  POA    COVID-19 [U07.1]  Yes    Fever [R50.9]  No    Acidosis, metabolic [E87.20]  Yes    Stroke [I63.9]  Yes    S/P right coronary artery (RCA) stent placement [Z95.5]  Not Applicable    AICD (automatic cardioverter/defibrillator) present [Z95.810]  Yes    Combined systolic and diastolic congestive heart failure, NYHA class 3 [I50.40]  Yes     Chronic    Obesity (BMI 30.0-34.9) [E66.811]  Yes     Chronic      Resolved Hospital Problems    Diagnosis Date Resolved POA    *Altered mental state [R41.82] 05/22/2025 Yes    Acute encephalopathy [G93.40] 05/21/2025 Yes    Hyponatremia [E87.1] 05/19/2025 Yes    CAD, multiple vessel [I25.10] 05/20/2025 Yes       Allergies:  Review of patient's allergies indicates:   Allergen Reactions    Atorvastatin Other (See Comments)       Diet: cardiac diet    Activities: Activity as tolerated    Goals of Care Treatment Preferences:  Code Status: DNR    COVID isolation until 05/29    CONSULTS:    Physical Therapy to evaluate and treat. , Occupational Therapy to evaluate and treat., and  to evaluate for community resources/long-range planning.      WOUND CARE ORDERS  None    Medications: Review discharge medications with patient and family and provide education.         Medication List        START taking these medications      albuterol 90 mcg/actuation inhaler  Commonly known as: PROVENTIL HFA  Inhale 2 puffs into the lungs every 6 (six) hours as needed for Wheezing. Rescue     aspirin 81 MG EC tablet  Commonly known as: ECOTRIN  Take 1 tablet by mouth every other day.     furosemide 40 MG  tablet  Commonly known as: LASIX  Take 1 tablet (40 mg total) by mouth once daily.            CHANGE how you take these medications      metoprolol succinate 25 MG 24 hr tablet  Commonly known as: TOPROL-XL  Take 1 tablet (25 mg total) by mouth once daily.  What changed:   medication strength  how much to take            CONTINUE taking these medications      alirocumab 75 mg/mL Pnij  Commonly known as: PRALUENT PEN  Inject 75 mg into the skin every 14 (fourteen) days.     cholecalciferol (vitamin D3) 50 mcg (2,000 unit) Tab  Commonly known as: VITAMIN D3  Take 2,000 Units by mouth once daily.     clopidogreL 75 mg tablet  Commonly known as: PLAVIX  Take 75 mg by mouth once daily.     cyanocobalamin 1000 MCG tablet  Commonly known as: VITAMIN B-12  Take 1,000 mcg by mouth once daily.     empagliflozin 25 mg tablet  Commonly known as: Jardiance  Take 12.5 mg by mouth once daily.     fluticasone propionate 50 mcg/actuation nasal spray  Commonly known as: FLONASE  1 spray by Each Nostril route 2 (two) times a day.     multivitamin with minerals Cap  Take 1 capsule by mouth once daily.     nitroGLYCERIN 0.4 MG SL tablet  Commonly known as: NITROSTAT  DIS 1 T UNT Q FIVE MINUTES UP TO THREE DOSES PRF CHEST PAIN     pantoprazole 40 MG tablet  Commonly known as: PROTONIX  Take 40 mg by mouth once daily.     sacubitriL-valsartan  mg per tablet  Commonly known as: ENTRESTO  Take 1 tablet by mouth 2 (two) times daily.     spironolactone 25 MG tablet  Commonly known as: ALDACTONE  Take 25 mg by mouth once daily.                Immunizations Administered as of 5/25/2025       No immunizations on file.              _________________________________  Goyo Youssef MD  05/25/2025

## 2025-05-25 NOTE — DISCHARGE SUMMARY
[facility]  Discharge Summary      Patient Name: Nimesh Saini  MRN: [id number]  LILIANE: [id number]  Patient Class: IP- Inpatient  Admission Date: 2025  Hospital Length of Stay: 7 days  Discharge Date and Time: 2025 12:24 PM  Attending Physician: Goyo Youssef MD   Discharging Provider: Goyo Youssef MD  Primary Care Provider: No primary care provider on file.    Primary Care Team: Networked reference to record PCT     HPI:   79-year-old male with extensive medical history including ischemic cardiomyopathy with an EF of less than 15% and multivessel CAD, presents brought in by EMS for altered mental status.  Patient drove to a gas station this evening, and after several hours of not leaving was noted to be lying on the ground next their car by gas station employees.  He had been on the way to a .     He states he was driving and trying to find a [employer] and it was like he lost control of everything. He  last remembers looking for the [employer].  No bladder or bowel incontinence. No tongue biting.     Mild leg swelling that is new. He has been eating a lot of crawfish and crabs.       * No surgery found *      Hospital Course:   Patient was admitted for acute encephalopathy, he was febrile and tested positive for COVID on , initially was requiring nasal canula and was congested but his symptoms has improved significantly, CTH was negative for acute event,   His AICD Was checked and has no event recorded  Pt was evaluated by PT who recommended rehab.     Goals of Care Treatment Preferences:  Code Status: DNR      SDOH Screening:  The patient was screened for utility difficulties, food insecurity, transport difficulties, housing insecurity, and interpersonal safety and there were no concerns identified this admission.     Consults:   Consults (From admission, onward)          Status Ordering Provider     Inpatient consult to [facility]  Once        Provider:   "(Not yet assigned)    Completed WAYLON NAIR     Inpatient consult to [facility]iology[facility]  Once        Provider:  Augusto Nelson MD    Completed MICHAEL CUEVA            Assessment & Plan  Combined systolic and diastolic congestive heart failure, NYHA class 3  Patient has Combined Systolic and Diastolic heart failure that is Chronic. On presentation their CHF was well compensated. Most recent BNP and echo results are listed below.  No results for input(s): "BNP" in the last 72 hours.    Latest ECHO  Results for orders placed during the hospital encounter of 01/05/25    Echo    Interpretation Summary    Left Ventricle: The left ventricle is normal in size. There is concentric hypertrophy. Regional wall motion abnormalities present. See diagram for wall motion findings. There is reduced systolic function. Quantitated ejection fraction is 36%. Unable to assess diastolic function due to poor image quality.    Right Ventricle: Normal right ventricular cavity size. Systolic function is normal. Pacemaker lead present in the ventricle.    Aortic Valve: There is aortic valve sclerosis. Mildly restricted motion. There is mild aortic regurgitation.    Mitral Valve: There is mild regurgitation.    Pulmonary Artery: The estimated pulmonary artery systolic pressure is 19 mmHg.    IVC/SVC: Normal venous pressure at 3 mmHg.    Current Heart Failure Medications  sacubitriL-valsartan  mg per tablet 1 tablet, 2 times daily, Oral  spironolactone tablet 25 mg, Daily, Oral  , Daily, Oral  furosemide tablet 40 mg, Daily, Oral  metoprolol succinate (TOPROL-XL) 24 hr tablet 25 mg, Daily, Oral  metoprolol succinate (TOPROL-XL) 24 hr tablet, Daily, Oral    Plan  - Monitor strict I&Os and daily weights.    - Place on telemetry  - Low sodium diet  - Place on fluid restriction of 2 L.   - Cardiology has been consulted  - The patient's volume status is at their baseline  -Cardio evaluated the patient , recommended device " interrogation  -continue Lasix 40 mg p.o.  -decreasing metoprolol to 25 mg due to bradycardia      Acidosis, metabolic (Resolved: 5/25/2025)  Mild       AICD (automatic cardioverter/defibrillator) present  Cards recs:  patient has a [employer] single-chamber AICD.    Device interrogation was done, I spoke with rep Mendes, the device is functioning well, no recorded events and patient is pacing <1%       S/P right coronary artery (RCA) stent placement  Chonic, stable    Stroke  Hx of  Continue chronic home meds  Fever (Resolved: 5/25/2025)  Negative UA/ CXR upon admission    Mostly related to COVID infection    Resolved     Obesity (BMI 30.0-34.9)      COVID-19  Symptoms resolved    Advance Care Planning  Current advance care plan has not been discussed with patient/family/POA and patient currently wishes DNR (Do Not Resuscitate).   Final Active Diagnoses:    Diagnosis Date Noted POA    COVID-19 [U07.1] 05/21/2025 Yes    Stroke [I63.9] 12/25/2018 Yes    S/P right coronary artery (RCA) stent placement [Z95.5] 03/08/2018 Not Applicable    AICD (automatic cardioverter/defibrillator) present [Z95.810] 02/09/2018 Yes    Combined systolic and diastolic congestive heart failure, NYHA class 3 [I50.40] 05/02/2017 Yes     Chronic    Obesity (BMI 30.0-34.9) [E66.811] 09/12/2014 Yes     Chronic      Problems Resolved During this Admission:    Diagnosis Date Noted Date Resolved POA    PRINCIPAL PROBLEM:  Altered mental state [R41.82] 05/17/2025 05/22/2025 Yes    Acute encephalopathy [G93.40] 05/19/2025 05/21/2025 Yes    Fever [R50.9] 05/18/2025 05/25/2025 No    Hyponatremia [E87.1] 05/17/2025 05/19/2025 Yes    Acidosis, metabolic [E87.20] 05/17/2025 05/25/2025 Yes    CAD, multiple vessel [I25.10] 05/02/2017 05/20/2025 Yes       Discharged Condition: good    Disposition: Rehab Facility    Follow Up:   Follow-up Information       [facility] Follow up.    Why: Address: [facility] [place] [address]  Phone:  [phone]  Per Ms. Wiggins at the clinic - your discharge summary will be faxed by ROQUE Sanchez to office at .  Attn Trenton Basurto.  YOU WILL BE CONTACTED WITH AN APPOINTMENT.             Jan Avendano MD Follow up on 5/22/2025.    Specialties: Interventional Cardiology, Cardiology  Why: 10:40 am  fax #  [phone]  -  DISCHARGE SUMMARY TO BE FAXED TO OFFICE AT DISCHARGE.  Contact information:  [address] [place] [id number] [phone]                           Patient Instructions:   No discharge procedures on file.    Significant Diagnostic Studies: Labs: CMP   Recent Labs   Lab 05/25/25  0524      K 3.6      CO2 23   GLU 97   BUN 27*   CREATININE 1.2   CALCIUM 9.1   ANIONGAP 11    and CBC   Recent Labs   Lab 05/25/25  0524   WBC 4.65   HGB 15.3   HCT 43.2          Pending Diagnostic Studies:       None           Medications:  Reconciled Home Medications:      Medication List        START taking these medications      albuterol 90 mcg/actuation inhaler  Commonly known as: PROVENTIL HFA  Inhale 2 puffs into the lungs every 6 (six) hours as needed for Wheezing. Rescue     aspirin 81 MG EC tablet  Commonly known as: ECOTRIN  Take 1 tablet by mouth every other day.     furosemide 40 MG tablet  Commonly known as: LASIX  Take 1 tablet (40 mg total) by mouth once daily.            CHANGE how you take these medications      metoprolol succinate 25 MG 24 hr tablet  Commonly known as: TOPROL-XL  Take 1 tablet (25 mg total) by mouth once daily.  What changed:   medication strength  how much to take            CONTINUE taking these medications      alirocumab 75 mg/mL Pnij  Commonly known as: PRALUENT PEN  Inject 75 mg into the skin every 14 (fourteen) days.     cholecalciferol (vitamin D3) 50 mcg (2,000 unit) Tab  Commonly known as: VITAMIN D3  Take 2,000 Units by mouth once daily.     clopidogreL 75 mg tablet  Commonly known as: PLAVIX  Take 75 mg by mouth once daily.      cyanocobalamin 1000 MCG tablet  Commonly known as: VITAMIN B-12  Take 1,000 mcg by mouth once daily.     empagliflozin 25 mg tablet  Commonly known as: Jardiance  Take 12.5 mg by mouth once daily.     fluticasone propionate 50 mcg/actuation nasal spray  Commonly known as: FLONASE  1 spray by Each Nostril route 2 (two) times a day.     multivitamin with minerals Cap  Take 1 capsule by mouth once daily.     nitroGLYCERIN 0.4 MG SL tablet  Commonly known as: NITROSTAT  DIS 1 T UNT Q FIVE MINUTES UP TO THREE DOSES PRF CHEST PAIN     pantoprazole 40 MG tablet  Commonly known as: PROTONIX  Take 40 mg by mouth once daily.     sacubitriL-valsartan  mg per tablet  Commonly known as: ENTRESTO  Take 1 tablet by mouth 2 (two) times daily.     spironolactone 25 MG tablet  Commonly known as: ALDACTONE  Take 25 mg by mouth once daily.              Indwelling Lines/Drains at time of discharge:   Lines/Drains/Airways       None                   Time spent on the discharge of patient: 35 minutes         Goyo Youssef MD  Department of Hospital Medicine  [facility]

## 2025-05-25 NOTE — HOSPITAL COURSE
Patient was admitted for acute encephalopathy, he was febrile and tested positive for COVID on 05/19, initially was requiring nasal canula and was congested but his symptoms has improved significantly, CTH was negative for acute event,   His AICD Was checked and has no event recorded  Pt was evaluated by PT who recommended rehab.

## 2025-05-25 NOTE — PLAN OF CARE
Pt less forgetful than previous shifts. Breath sounds continue to be diminished, but less coarse. Pt up frequently between toilet, chair and bed. Cough well managed with tessalon pearls and cough syrup. Fall risk monitoring continued. Plan of care reviewed. Medications administered per MAR. Safety maintained.     Problem: Adult Inpatient Plan of Care  Goal: Plan of Care Review  Outcome: Progressing  Goal: Patient-Specific Goal (Individualized)  Outcome: Progressing     Problem: Fall Injury Risk  Goal: Absence of Fall and Fall-Related Injury  Outcome: Progressing     Problem: Comorbidity Management  Goal: Maintenance of Heart Failure Symptom Control  Outcome: Progressing     Problem: Electrolyte Imbalance  Goal: Electrolyte Balance  Outcome: Progressing     Problem: Fluid Volume Excess  Goal: Fluid Balance  Outcome: Progressing     Problem: Skin Injury Risk Increased  Goal: Skin Health and Integrity  Outcome: Progressing

## 2025-05-25 NOTE — ASSESSMENT & PLAN NOTE
"Patient has Combined Systolic and Diastolic heart failure that is Chronic. On presentation their CHF was well compensated. Most recent BNP and echo results are listed below.  No results for input(s): "BNP" in the last 72 hours.    Latest ECHO  Results for orders placed during the hospital encounter of 01/05/25    Echo    Interpretation Summary    Left Ventricle: The left ventricle is normal in size. There is concentric hypertrophy. Regional wall motion abnormalities present. See diagram for wall motion findings. There is reduced systolic function. Quantitated ejection fraction is 36%. Unable to assess diastolic function due to poor image quality.    Right Ventricle: Normal right ventricular cavity size. Systolic function is normal. Pacemaker lead present in the ventricle.    Aortic Valve: There is aortic valve sclerosis. Mildly restricted motion. There is mild aortic regurgitation.    Mitral Valve: There is mild regurgitation.    Pulmonary Artery: The estimated pulmonary artery systolic pressure is 19 mmHg.    IVC/SVC: Normal venous pressure at 3 mmHg.    Current Heart Failure Medications  sacubitriL-valsartan  mg per tablet 1 tablet, 2 times daily, Oral  spironolactone tablet 25 mg, Daily, Oral  , Daily, Oral  furosemide tablet 40 mg, Daily, Oral  metoprolol succinate (TOPROL-XL) 24 hr tablet 25 mg, Daily, Oral  metoprolol succinate (TOPROL-XL) 24 hr tablet, Daily, Oral    Plan  - Monitor strict I&Os and daily weights.    - Place on telemetry  - Low sodium diet  - Place on fluid restriction of 2 L.   - Cardiology has been consulted  - The patient's volume status is at their baseline  -Cardio evaluated the patient , recommended device interrogation  -continue Lasix 40 mg p.o.  -decreasing metoprolol to 25 mg due to bradycardia      "

## 2025-05-25 NOTE — ASSESSMENT & PLAN NOTE
Symptoms resolved    Advance Care Planning  Current advance care plan has not been discussed with patient/family/POA and patient currently wishes DNR (Do Not Resuscitate).

## 2025-06-03 ENCOUNTER — HOSPITAL ENCOUNTER (OUTPATIENT)
Dept: RADIOLOGY | Facility: HOSPITAL | Age: 80
Discharge: HOME | End: 2025-06-03
Payer: MEDICARE

## 2025-09-04 ENCOUNTER — DOCUMENT SCAN (OUTPATIENT)
Dept: HOME HEALTH SERVICES | Facility: HOSPITAL | Age: 80
End: 2025-09-04
Payer: MEDICARE